# Patient Record
Sex: MALE | Race: WHITE | Employment: FULL TIME | URBAN - METROPOLITAN AREA
[De-identification: names, ages, dates, MRNs, and addresses within clinical notes are randomized per-mention and may not be internally consistent; named-entity substitution may affect disease eponyms.]

---

## 2017-03-16 ENCOUNTER — OFFICE VISIT (OUTPATIENT)
Dept: INTERNAL MEDICINE CLINIC | Age: 42
End: 2017-03-16

## 2017-03-16 VITALS
WEIGHT: 155.4 LBS | DIASTOLIC BLOOD PRESSURE: 72 MMHG | TEMPERATURE: 97.9 F | HEART RATE: 96 BPM | BODY MASS INDEX: 23.55 KG/M2 | HEIGHT: 68 IN | SYSTOLIC BLOOD PRESSURE: 128 MMHG

## 2017-03-16 DIAGNOSIS — Z00.00 ROUTINE GENERAL MEDICAL EXAMINATION AT A HEALTH CARE FACILITY: Primary | ICD-10-CM

## 2017-03-16 DIAGNOSIS — R53.83 FATIGUE, UNSPECIFIED TYPE: ICD-10-CM

## 2017-03-16 DIAGNOSIS — E55.9 VITAMIN D INSUFFICIENCY: ICD-10-CM

## 2017-03-16 DIAGNOSIS — I10 HYPERTENSION, ESSENTIAL: ICD-10-CM

## 2017-03-16 DIAGNOSIS — K21.9 GASTROESOPHAGEAL REFLUX DISEASE WITHOUT ESOPHAGITIS: ICD-10-CM

## 2017-03-16 DIAGNOSIS — E78.2 HYPERLIPIDEMIA, MIXED: ICD-10-CM

## 2017-03-16 DIAGNOSIS — R73.9 HYPERGLYCEMIA: ICD-10-CM

## 2017-03-16 LAB
A/G RATIO: 1.3 (ref 1.1–2.2)
ALBUMIN SERPL-MCNC: 4.7 G/DL (ref 3.4–5)
ALP BLD-CCNC: 92 U/L (ref 40–129)
ALT SERPL-CCNC: 56 U/L (ref 10–40)
ANION GAP SERPL CALCULATED.3IONS-SCNC: 16 MMOL/L (ref 3–16)
AST SERPL-CCNC: 35 U/L (ref 15–37)
BASOPHILS ABSOLUTE: 0 K/UL (ref 0–0.2)
BASOPHILS RELATIVE PERCENT: 0.6 %
BILIRUB SERPL-MCNC: 0.7 MG/DL (ref 0–1)
BUN BLDV-MCNC: 12 MG/DL (ref 7–20)
CALCIUM SERPL-MCNC: 9.2 MG/DL (ref 8.3–10.6)
CHLORIDE BLD-SCNC: 98 MMOL/L (ref 99–110)
CHOLESTEROL, TOTAL: 169 MG/DL (ref 0–199)
CO2: 24 MMOL/L (ref 21–32)
CREAT SERPL-MCNC: 0.6 MG/DL (ref 0.9–1.3)
EOSINOPHILS ABSOLUTE: 0.1 K/UL (ref 0–0.6)
EOSINOPHILS RELATIVE PERCENT: 2.8 %
FOLATE: >20 NG/ML (ref 4.78–24.2)
GFR AFRICAN AMERICAN: >60
GFR NON-AFRICAN AMERICAN: >60
GLOBULIN: 3.7 G/DL
GLUCOSE BLD-MCNC: 115 MG/DL (ref 70–99)
HCT VFR BLD CALC: 45.4 % (ref 40.5–52.5)
HDLC SERPL-MCNC: 39 MG/DL (ref 40–60)
HEMOGLOBIN: 15.3 G/DL (ref 13.5–17.5)
LDL CHOLESTEROL CALCULATED: 103 MG/DL
LYMPHOCYTES ABSOLUTE: 0.8 K/UL (ref 1–5.1)
LYMPHOCYTES RELATIVE PERCENT: 18.1 %
MCH RBC QN AUTO: 31.2 PG (ref 26–34)
MCHC RBC AUTO-ENTMCNC: 33.7 G/DL (ref 31–36)
MCV RBC AUTO: 92.5 FL (ref 80–100)
MONOCYTES ABSOLUTE: 0.4 K/UL (ref 0–1.3)
MONOCYTES RELATIVE PERCENT: 8.2 %
NEUTROPHILS ABSOLUTE: 3.1 K/UL (ref 1.7–7.7)
NEUTROPHILS RELATIVE PERCENT: 70.3 %
PDW BLD-RTO: 13.1 % (ref 12.4–15.4)
PLATELET # BLD: 153 K/UL (ref 135–450)
PMV BLD AUTO: 9.5 FL (ref 5–10.5)
POTASSIUM SERPL-SCNC: 4.7 MMOL/L (ref 3.5–5.1)
RBC # BLD: 4.9 M/UL (ref 4.2–5.9)
SODIUM BLD-SCNC: 138 MMOL/L (ref 136–145)
TOTAL PROTEIN: 8.4 G/DL (ref 6.4–8.2)
TRIGL SERPL-MCNC: 134 MG/DL (ref 0–150)
TSH REFLEX: 2.28 UIU/ML (ref 0.27–4.2)
VITAMIN B-12: 883 PG/ML (ref 211–911)
VLDLC SERPL CALC-MCNC: 27 MG/DL
WBC # BLD: 4.5 K/UL (ref 4–11)

## 2017-03-16 PROCEDURE — 99214 OFFICE O/P EST MOD 30 MIN: CPT | Performed by: FAMILY MEDICINE

## 2017-03-16 PROCEDURE — 99396 PREV VISIT EST AGE 40-64: CPT | Performed by: FAMILY MEDICINE

## 2017-03-16 PROCEDURE — 36415 COLL VENOUS BLD VENIPUNCTURE: CPT | Performed by: FAMILY MEDICINE

## 2017-03-16 RX ORDER — LISINOPRIL 20 MG/1
TABLET ORAL
Qty: 90 TABLET | Refills: 1 | Status: SHIPPED | OUTPATIENT
Start: 2017-03-16 | End: 2017-05-25 | Stop reason: SDUPTHER

## 2017-03-16 RX ORDER — ATORVASTATIN CALCIUM 20 MG/1
TABLET, FILM COATED ORAL
Qty: 90 TABLET | Refills: 1 | Status: SHIPPED | OUTPATIENT
Start: 2017-03-16 | End: 2017-10-02 | Stop reason: SDUPTHER

## 2017-03-16 RX ORDER — OMEPRAZOLE 20 MG/1
20 CAPSULE, DELAYED RELEASE ORAL DAILY
COMMUNITY
End: 2017-12-15 | Stop reason: ALTCHOICE

## 2017-03-17 LAB
ESTIMATED AVERAGE GLUCOSE: 99.7 MG/DL
HBA1C MFR BLD: 5.1 %
VITAMIN D 25-HYDROXY: 58.5 NG/ML

## 2017-04-18 ENCOUNTER — TELEPHONE (OUTPATIENT)
Dept: INTERNAL MEDICINE CLINIC | Age: 42
End: 2017-04-18

## 2017-05-25 ENCOUNTER — TELEPHONE (OUTPATIENT)
Dept: INTERNAL MEDICINE CLINIC | Age: 42
End: 2017-05-25

## 2017-05-25 DIAGNOSIS — I10 HYPERTENSION, ESSENTIAL: ICD-10-CM

## 2017-05-25 DIAGNOSIS — Z00.00 ROUTINE GENERAL MEDICAL EXAMINATION AT A HEALTH CARE FACILITY: ICD-10-CM

## 2017-05-25 RX ORDER — LISINOPRIL 20 MG/1
TABLET ORAL
Qty: 90 TABLET | Refills: 1 | Status: SHIPPED | OUTPATIENT
Start: 2017-05-25 | End: 2017-10-02 | Stop reason: SDUPTHER

## 2017-10-02 ENCOUNTER — TELEPHONE (OUTPATIENT)
Dept: INTERNAL MEDICINE CLINIC | Age: 42
End: 2017-10-02

## 2017-10-02 DIAGNOSIS — E78.2 HYPERLIPIDEMIA, MIXED: ICD-10-CM

## 2017-10-02 DIAGNOSIS — Z00.00 ROUTINE GENERAL MEDICAL EXAMINATION AT A HEALTH CARE FACILITY: ICD-10-CM

## 2017-10-02 DIAGNOSIS — I10 HYPERTENSION, ESSENTIAL: ICD-10-CM

## 2017-10-02 NOTE — TELEPHONE ENCOUNTER
Patient will need refills for lisinopril and Atorvastatin 90 days sent to [facility].    Questions, call back at [phone]

## 2017-10-02 NOTE — TELEPHONE ENCOUNTER
Refill request for atorvastatin/lisinopril medication.      Name of [facility]- [facility]    Last visit - 3-16-17     Pending visit - 11-21-17    Last refill -3-16-17/5-25-17    Medication Contract signed -   Last Konrad barragan-     Additional Comments

## 2017-10-03 RX ORDER — LISINOPRIL 20 MG/1
TABLET ORAL
Qty: 90 TABLET | Refills: 1 | Status: SHIPPED | OUTPATIENT
Start: 2017-10-03 | End: 2018-04-18 | Stop reason: SDUPTHER

## 2017-10-03 RX ORDER — ATORVASTATIN CALCIUM 20 MG/1
TABLET, FILM COATED ORAL
Qty: 90 TABLET | Refills: 1 | Status: SHIPPED | OUTPATIENT
Start: 2017-10-03 | End: 2018-03-22 | Stop reason: SDUPTHER

## 2017-11-21 ENCOUNTER — OFFICE VISIT (OUTPATIENT)
Dept: INTERNAL MEDICINE CLINIC | Age: 42
End: 2017-11-21

## 2017-11-21 VITALS
SYSTOLIC BLOOD PRESSURE: 126 MMHG | DIASTOLIC BLOOD PRESSURE: 70 MMHG | HEIGHT: 68 IN | BODY MASS INDEX: 21.1 KG/M2 | WEIGHT: 139.2 LBS | HEART RATE: 72 BPM

## 2017-11-21 DIAGNOSIS — I10 HYPERTENSION, ESSENTIAL: Primary | ICD-10-CM

## 2017-11-21 DIAGNOSIS — E78.2 HYPERLIPIDEMIA, MIXED: ICD-10-CM

## 2017-11-21 DIAGNOSIS — R73.9 HYPERGLYCEMIA: ICD-10-CM

## 2017-11-21 DIAGNOSIS — K21.9 GASTROESOPHAGEAL REFLUX DISEASE, ESOPHAGITIS PRESENCE NOT SPECIFIED: ICD-10-CM

## 2017-11-21 LAB
A/G RATIO: 1.1 (ref 1.1–2.2)
ALBUMIN SERPL-MCNC: 4.5 G/DL (ref 3.4–5)
ALP BLD-CCNC: 98 U/L (ref 40–129)
ALT SERPL-CCNC: 22 U/L (ref 10–40)
ANION GAP SERPL CALCULATED.3IONS-SCNC: 12 MMOL/L (ref 3–16)
AST SERPL-CCNC: 23 U/L (ref 15–37)
BILIRUB SERPL-MCNC: 0.8 MG/DL (ref 0–1)
BUN BLDV-MCNC: 11 MG/DL (ref 7–20)
CALCIUM SERPL-MCNC: 9.3 MG/DL (ref 8.3–10.6)
CHLORIDE BLD-SCNC: 96 MMOL/L (ref 99–110)
CHOLESTEROL, TOTAL: 124 MG/DL (ref 0–199)
CO2: 28 MMOL/L (ref 21–32)
CREAT SERPL-MCNC: 0.7 MG/DL (ref 0.9–1.3)
GFR AFRICAN AMERICAN: >60
GFR NON-AFRICAN AMERICAN: >60
GLOBULIN: 4 G/DL
GLUCOSE BLD-MCNC: 99 MG/DL (ref 70–99)
HDLC SERPL-MCNC: 49 MG/DL (ref 40–60)
LDL CHOLESTEROL CALCULATED: 63 MG/DL
POTASSIUM SERPL-SCNC: 4.4 MMOL/L (ref 3.5–5.1)
SODIUM BLD-SCNC: 136 MMOL/L (ref 136–145)
TOTAL PROTEIN: 8.5 G/DL (ref 6.4–8.2)
TRIGL SERPL-MCNC: 59 MG/DL (ref 0–150)
VLDLC SERPL CALC-MCNC: 12 MG/DL

## 2017-11-21 PROCEDURE — 36415 COLL VENOUS BLD VENIPUNCTURE: CPT | Performed by: FAMILY MEDICINE

## 2017-11-21 PROCEDURE — 99214 OFFICE O/P EST MOD 30 MIN: CPT | Performed by: FAMILY MEDICINE

## 2017-11-21 NOTE — PROGRESS NOTES
Subjective:      Patient ID: Milena Aguirre is a 38 y.o. male. HPI  Hypertension:  Home blood pressure monitoring: Yes - always <140/90. He is generally adherent to a low sodium diet. Patient denies chest pain, shortness of breath, headache, lightheadedness, blurred vision, peripheral edema, palpitations and dry cough. Antihypertensive medication side effects: no medication side effects noted. Use of agents associated with hypertension: NSAIDS. Sodium (mmol/L)   Date Value   03/16/2017 138    BUN (mg/dL)   Date Value   03/16/2017 12    Glucose (mg/dL)   Date Value   03/16/2017 115 (H)      Potassium (mmol/L)   Date Value   03/16/2017 4.7    CREATININE (mg/dL)   Date Value   03/16/2017 0.6 (L)         Hyperlipidemia:  No new myalgias or GI upset on atorvastatin (Lipitor). Medication compliance: compliant most of the time. Patient is  following a low fat, low cholesterol diet. \"Were 75% vegetarian now,\" and pt has lost about 23 pounds. Stopped buying meat at home, but will still eat it at restaurants or friends'/family's houses. He is  exercising regularly. Does hot yoga 6-8x/week (sometimes goes twice a day). Lab Results   Component Value Date    TRIG 134 03/16/2017    HDL 39 03/16/2017    HDL 43 05/22/2012    LDLCALC 103 03/16/2017     Lab Results   Component Value Date    ALT 56 (H) 03/16/2017    AST 35 03/16/2017        Hyperglycemia  Has made a lot of lifestyle changes and has lost weight (intentionally). See above. Due for recheck of labs today. GERD  Kyat complains of heartburn. This has been associated with heartburn, midespigastric pain and upper abdominal discomfort. He denies difficulty swallowing, dysphagia, hematemesis, melena, regurgitation of undigested food, shortness of breath, unexpected weight loss and wheezing. Symptoms have been present for several years. He denies dysphagia. He has not lost weight.  He denies melena, hematochezia, hematemesis, and coffee ground emesis. Medical therapy in the past has included proton pump inhibitors. Sx stable on Prilosec OTC but asking about its safety long term. Has not tried H2 blockers. Outpatient Prescriptions Marked as Taking for the 11/21/17 encounter (Office Visit) with Mandi Butler MD   Medication Sig Dispense Refill    atorvastatin (LIPITOR) 20 MG tablet TAKE 1 TABLET DAILY 90 tablet 1    lisinopril (PRINIVIL;ZESTRIL) 20 MG tablet TAKE 1 TABLET EVERY DAY 90 tablet 1    omeprazole (PRILOSEC) 20 MG delayed release capsule Take 20 mg by mouth daily      ciclopirox (LOPROX) 0.77 % cream Apply topically 2 times daily. 60 g 1    Cholecalciferol (VITAMIN D PO) Take 4,000 Int'l Units by mouth daily. Review of Systems  Review of Systems - General ROS: negative  Psychological ROS: negative  ENT ROS: negative  Respiratory ROS: no cough, shortness of breath, or wheezing  Cardiovascular ROS: no chest pain or dyspnea on exertion  Gastrointestinal ROS: no abdominal pain, change in bowel habits, or black or bloody stools  Genito-Urinary ROS: no dysuria, trouble voiding, or hematuria  Musculoskeletal ROS: negative  Neurological ROS: no TIA or stroke symptoms  Dermatological ROS: negative      Objective:   Physical Exam  Nursing note and vitals reviewed. Vitals:    11/21/17 0851   BP: 126/70   Pulse: 72   Weight: 139 lb 3.2 oz (63.1 kg)   Height: 5' 7.5\" (1.715 m)     Wt Readings from Last 3 Encounters:   11/21/17 139 lb 3.2 oz (63.1 kg)   03/16/17 155 lb 6.4 oz (70.5 kg)   10/11/16 152 lb 3.2 oz (69 kg)     BP Readings from Last 3 Encounters:   11/21/17 126/70   03/16/17 128/72   10/11/16 134/78     Body mass index is 21.48 kg/m². Constitutional: Patient appears well-developed and well-nourished. No distress. Head: Normocephalic and atraumatic. Oropharyngeal exam: mucous membranes moist, pharynx normal without lesions. Neck: Normal range of motion. Neck supple. No thyroidmegaly.

## 2017-11-22 LAB
ESTIMATED AVERAGE GLUCOSE: 105.4 MG/DL
HBA1C MFR BLD: 5.3 %

## 2017-12-14 ENCOUNTER — TELEPHONE (OUTPATIENT)
Dept: INTERNAL MEDICINE CLINIC | Age: 42
End: 2017-12-14

## 2017-12-15 ENCOUNTER — OFFICE VISIT (OUTPATIENT)
Dept: ORTHOPEDIC SURGERY | Age: 42
End: 2017-12-15

## 2017-12-15 VITALS
BODY MASS INDEX: 21.52 KG/M2 | SYSTOLIC BLOOD PRESSURE: 131 MMHG | DIASTOLIC BLOOD PRESSURE: 81 MMHG | WEIGHT: 142 LBS | HEIGHT: 68 IN | HEART RATE: 67 BPM

## 2017-12-15 DIAGNOSIS — S46.912A STRAIN OF ACROMIOCLAVICULAR JOINT, LEFT, INITIAL ENCOUNTER: Primary | ICD-10-CM

## 2017-12-15 DIAGNOSIS — M25.512 PAIN OF LEFT SHOULDER REGION: ICD-10-CM

## 2017-12-15 PROBLEM — S46.919A STRAIN OF ACROMIOCLAVICULAR JOINT: Status: ACTIVE | Noted: 2017-12-15

## 2017-12-15 PROCEDURE — 99213 OFFICE O/P EST LOW 20 MIN: CPT | Performed by: PHYSICIAN ASSISTANT

## 2017-12-15 NOTE — PROGRESS NOTES
as well as  press for shoulder exercises may increase his level pain and discomfort. Load bearing activities such as yoga may also occasionally cause him problems but I do not think this would be something that would limit him long-term. He is instructed to take ice and oral anti-inflammatories occasionally as needed for his symptoms.   Follow-up as needed

## 2018-03-22 DIAGNOSIS — Z00.00 ROUTINE GENERAL MEDICAL EXAMINATION AT A HEALTH CARE FACILITY: ICD-10-CM

## 2018-03-22 DIAGNOSIS — E78.2 HYPERLIPIDEMIA, MIXED: ICD-10-CM

## 2018-03-22 RX ORDER — ATORVASTATIN CALCIUM 20 MG/1
TABLET, FILM COATED ORAL
Qty: 90 TABLET | Refills: 1 | Status: SHIPPED | OUTPATIENT
Start: 2018-03-22 | End: 2018-10-08 | Stop reason: SDUPTHER

## 2018-04-18 DIAGNOSIS — I10 HYPERTENSION, ESSENTIAL: ICD-10-CM

## 2018-04-18 DIAGNOSIS — Z00.00 ROUTINE GENERAL MEDICAL EXAMINATION AT A HEALTH CARE FACILITY: ICD-10-CM

## 2018-04-18 RX ORDER — LISINOPRIL 20 MG/1
TABLET ORAL
Qty: 90 TABLET | Refills: 1 | Status: SHIPPED | OUTPATIENT
Start: 2018-04-18 | End: 2018-10-08 | Stop reason: SDUPTHER

## 2018-05-22 ENCOUNTER — OFFICE VISIT (OUTPATIENT)
Dept: INTERNAL MEDICINE CLINIC | Age: 43
End: 2018-05-22

## 2018-05-22 VITALS
HEART RATE: 78 BPM | HEIGHT: 67 IN | OXYGEN SATURATION: 98 % | WEIGHT: 141 LBS | BODY MASS INDEX: 22.13 KG/M2 | DIASTOLIC BLOOD PRESSURE: 78 MMHG | SYSTOLIC BLOOD PRESSURE: 132 MMHG

## 2018-05-22 DIAGNOSIS — K21.9 GASTROESOPHAGEAL REFLUX DISEASE, ESOPHAGITIS PRESENCE NOT SPECIFIED: ICD-10-CM

## 2018-05-22 DIAGNOSIS — E78.2 HYPERLIPIDEMIA, MIXED: ICD-10-CM

## 2018-05-22 DIAGNOSIS — Z00.00 ROUTINE GENERAL MEDICAL EXAMINATION AT A HEALTH CARE FACILITY: Primary | ICD-10-CM

## 2018-05-22 DIAGNOSIS — Z23 NEED FOR VACCINE FOR DT (DIPHTHERIA-TETANUS): ICD-10-CM

## 2018-05-22 DIAGNOSIS — E55.9 VITAMIN D INSUFFICIENCY: ICD-10-CM

## 2018-05-22 DIAGNOSIS — R73.9 HYPERGLYCEMIA: ICD-10-CM

## 2018-05-22 DIAGNOSIS — I10 HYPERTENSION, ESSENTIAL: ICD-10-CM

## 2018-05-22 PROCEDURE — 90714 TD VACC NO PRESV 7 YRS+ IM: CPT | Performed by: FAMILY MEDICINE

## 2018-05-22 PROCEDURE — 99214 OFFICE O/P EST MOD 30 MIN: CPT | Performed by: FAMILY MEDICINE

## 2018-05-22 PROCEDURE — 90471 IMMUNIZATION ADMIN: CPT | Performed by: FAMILY MEDICINE

## 2018-05-22 PROCEDURE — 99396 PREV VISIT EST AGE 40-64: CPT | Performed by: FAMILY MEDICINE

## 2018-05-22 ASSESSMENT — ENCOUNTER SYMPTOMS
RHINORRHEA: 1
WHEEZING: 0
SORE THROAT: 0
BACK PAIN: 0
ABDOMINAL PAIN: 0
VOMITING: 0
CHEST TIGHTNESS: 0
COLOR CHANGE: 0
DIARRHEA: 0
COUGH: 1
CONSTIPATION: 0
SHORTNESS OF BREATH: 0
TROUBLE SWALLOWING: 0
EYE PAIN: 0
EYE REDNESS: 0
NAUSEA: 0
EYE DISCHARGE: 0
SINUS PRESSURE: 0

## 2018-05-23 LAB
A/G RATIO: 1.1 (ref 1.1–2.2)
ALBUMIN SERPL-MCNC: 4.4 G/DL (ref 3.4–5)
ALP BLD-CCNC: 102 U/L (ref 40–129)
ALT SERPL-CCNC: 29 U/L (ref 10–40)
ANION GAP SERPL CALCULATED.3IONS-SCNC: 13 MMOL/L (ref 3–16)
AST SERPL-CCNC: 22 U/L (ref 15–37)
BASOPHILS ABSOLUTE: 0 K/UL (ref 0–0.2)
BASOPHILS RELATIVE PERCENT: 0.4 %
BILIRUB SERPL-MCNC: 0.9 MG/DL (ref 0–1)
BUN BLDV-MCNC: 10 MG/DL (ref 7–20)
CALCIUM SERPL-MCNC: 9 MG/DL (ref 8.3–10.6)
CHLORIDE BLD-SCNC: 94 MMOL/L (ref 99–110)
CHOLESTEROL, TOTAL: 125 MG/DL (ref 0–199)
CO2: 27 MMOL/L (ref 21–32)
CREAT SERPL-MCNC: 0.7 MG/DL (ref 0.9–1.3)
EOSINOPHILS ABSOLUTE: 0.3 K/UL (ref 0–0.6)
EOSINOPHILS RELATIVE PERCENT: 5.3 %
ESTIMATED AVERAGE GLUCOSE: 91.1 MG/DL
GFR AFRICAN AMERICAN: >60
GFR NON-AFRICAN AMERICAN: >60
GLOBULIN: 4 G/DL
GLUCOSE BLD-MCNC: 105 MG/DL (ref 70–99)
HBA1C MFR BLD: 4.8 %
HCT VFR BLD CALC: 44.3 % (ref 40.5–52.5)
HDLC SERPL-MCNC: 42 MG/DL (ref 40–60)
HEMOGLOBIN: 15.5 G/DL (ref 13.5–17.5)
LDL CHOLESTEROL CALCULATED: 65 MG/DL
LYMPHOCYTES ABSOLUTE: 0.7 K/UL (ref 1–5.1)
LYMPHOCYTES RELATIVE PERCENT: 14.5 %
MCH RBC QN AUTO: 33.2 PG (ref 26–34)
MCHC RBC AUTO-ENTMCNC: 34.9 G/DL (ref 31–36)
MCV RBC AUTO: 95.2 FL (ref 80–100)
MONOCYTES ABSOLUTE: 0.5 K/UL (ref 0–1.3)
MONOCYTES RELATIVE PERCENT: 9.9 %
NEUTROPHILS ABSOLUTE: 3.3 K/UL (ref 1.7–7.7)
NEUTROPHILS RELATIVE PERCENT: 69.9 %
PDW BLD-RTO: 13.8 % (ref 12.4–15.4)
PLATELET # BLD: 163 K/UL (ref 135–450)
PMV BLD AUTO: 9.2 FL (ref 5–10.5)
POTASSIUM SERPL-SCNC: 4.6 MMOL/L (ref 3.5–5.1)
RBC # BLD: 4.66 M/UL (ref 4.2–5.9)
SODIUM BLD-SCNC: 134 MMOL/L (ref 136–145)
TOTAL PROTEIN: 8.4 G/DL (ref 6.4–8.2)
TRIGL SERPL-MCNC: 88 MG/DL (ref 0–150)
VITAMIN D 25-HYDROXY: 32.1 NG/ML
VLDLC SERPL CALC-MCNC: 18 MG/DL
WBC # BLD: 4.7 K/UL (ref 4–11)

## 2018-10-08 DIAGNOSIS — Z00.00 ROUTINE GENERAL MEDICAL EXAMINATION AT A HEALTH CARE FACILITY: ICD-10-CM

## 2018-10-08 DIAGNOSIS — E78.2 HYPERLIPIDEMIA, MIXED: ICD-10-CM

## 2018-10-08 DIAGNOSIS — I10 HYPERTENSION, ESSENTIAL: ICD-10-CM

## 2018-10-08 RX ORDER — LISINOPRIL 20 MG/1
TABLET ORAL
Qty: 90 TABLET | Refills: 0 | Status: SHIPPED | OUTPATIENT
Start: 2018-10-08 | End: 2019-01-22 | Stop reason: SDUPTHER

## 2018-10-08 RX ORDER — ATORVASTATIN CALCIUM 20 MG/1
TABLET, FILM COATED ORAL
Qty: 90 TABLET | Refills: 0 | Status: SHIPPED | OUTPATIENT
Start: 2018-10-08 | End: 2019-01-22 | Stop reason: SDUPTHER

## 2018-10-16 ENCOUNTER — OFFICE VISIT (OUTPATIENT)
Dept: INTERNAL MEDICINE CLINIC | Age: 43
End: 2018-10-16
Payer: COMMERCIAL

## 2018-10-16 VITALS
OXYGEN SATURATION: 100 % | BODY MASS INDEX: 23.53 KG/M2 | DIASTOLIC BLOOD PRESSURE: 78 MMHG | HEART RATE: 90 BPM | WEIGHT: 148 LBS | SYSTOLIC BLOOD PRESSURE: 118 MMHG

## 2018-10-16 DIAGNOSIS — K21.9 GASTROESOPHAGEAL REFLUX DISEASE, ESOPHAGITIS PRESENCE NOT SPECIFIED: Primary | ICD-10-CM

## 2018-10-16 PROCEDURE — 99213 OFFICE O/P EST LOW 20 MIN: CPT | Performed by: NURSE PRACTITIONER

## 2018-10-16 RX ORDER — DOXYCYCLINE 100 MG/1
CAPSULE ORAL
Refills: 0 | COMMUNITY
Start: 2018-09-13 | End: 2018-10-16 | Stop reason: ALTCHOICE

## 2018-10-16 RX ORDER — OMEPRAZOLE 20 MG/1
20 CAPSULE, DELAYED RELEASE ORAL DAILY
Qty: 30 CAPSULE | Refills: 3 | Status: SHIPPED | OUTPATIENT
Start: 2018-10-16 | End: 2019-01-22 | Stop reason: ALTCHOICE

## 2018-10-16 ASSESSMENT — ENCOUNTER SYMPTOMS
HEARTBURN: 1
VOMITING: 0
EYE ITCHING: 0
ABDOMINAL DISTENTION: 0
EYE REDNESS: 0
CONSTIPATION: 0
WHEEZING: 0
SINUS PRESSURE: 0
RHINORRHEA: 0
STRIDOR: 0
SINUS PAIN: 0
CHEST TIGHTNESS: 0
WATER BRASH: 0
COLOR CHANGE: 0
TROUBLE SWALLOWING: 0
EYE DISCHARGE: 0
DIARRHEA: 0
SORE THROAT: 0
SHORTNESS OF BREATH: 0
GLOBUS SENSATION: 0
BELCHING: 1
NAUSEA: 1
CHOKING: 0
HOARSE VOICE: 0
APNEA: 0
ABDOMINAL PAIN: 0
COUGH: 0
EYE PAIN: 0

## 2018-10-16 ASSESSMENT — PATIENT HEALTH QUESTIONNAIRE - PHQ9
1. LITTLE INTEREST OR PLEASURE IN DOING THINGS: 0
SUM OF ALL RESPONSES TO PHQ QUESTIONS 1-9: 0
SUM OF ALL RESPONSES TO PHQ9 QUESTIONS 1 & 2: 0
SUM OF ALL RESPONSES TO PHQ QUESTIONS 1-9: 0
2. FEELING DOWN, DEPRESSED OR HOPELESS: 0

## 2018-10-16 NOTE — PATIENT INSTRUCTIONS
Smoking can make GERD worse. If you need help quitting, talk to your doctor about stop-smoking programs and medicines. These can increase your chances of quitting for good. · If you have GERD symptoms at night, raise the head of your bed 6 to 8 inches by putting the frame on blocks or placing a foam wedge under the head of your mattress. (Adding extra pillows does not work.)  · Do not wear tight clothing around your middle. · Lose weight if you need to. Losing just 5 to 10 pounds can help. When should you call for help? Call your doctor now or seek immediate medical care if:    · You have new or different belly pain.     · Your stools are black and tarlike or have streaks of blood.    Watch closely for changes in your health, and be sure to contact your doctor if:    · Your symptoms have not improved after 2 days.     · Food seems to catch in your throat or chest.   Where can you learn more? Go to [email or web address] [email or web address].[employer]. org and sign in to your [employer] account. Enter X604 in the [employer] box to learn more about \"Gastroesophageal Reflux Disease (GERD): Care Instructions. \"     If you do not have an account, please click on the \"Sign Up Now\" link. Current as of: May 12, 2017  Content Version: 11.7  © 3860-4556 [employer]. Care instructions adapted under license by [facility] ([facility]). If you have questions about a medical condition or this instruction, always ask your healthcare professional. [place] [address] any warranty or liability for your use of this information.

## 2018-11-26 ENCOUNTER — TELEPHONE (OUTPATIENT)
Dept: INTERNAL MEDICINE CLINIC | Age: 43
End: 2018-11-26

## 2018-11-26 DIAGNOSIS — E55.9 VITAMIN D INSUFFICIENCY: ICD-10-CM

## 2018-11-26 DIAGNOSIS — R73.9 HYPERGLYCEMIA: ICD-10-CM

## 2018-11-26 DIAGNOSIS — E78.2 HYPERLIPIDEMIA, MIXED: ICD-10-CM

## 2018-11-26 DIAGNOSIS — I10 HYPERTENSION, ESSENTIAL: ICD-10-CM

## 2018-11-26 DIAGNOSIS — Z00.00 ROUTINE GENERAL MEDICAL EXAMINATION AT A HEALTH CARE FACILITY: Primary | ICD-10-CM

## 2019-01-18 ENCOUNTER — HOSPITAL ENCOUNTER (OUTPATIENT)
Age: 44
Discharge: HOME | End: 2019-01-18
Payer: COMMERCIAL

## 2019-01-18 DIAGNOSIS — E55.9 VITAMIN D INSUFFICIENCY: ICD-10-CM

## 2019-01-18 DIAGNOSIS — I10 HYPERTENSION, ESSENTIAL: ICD-10-CM

## 2019-01-18 DIAGNOSIS — E78.2 HYPERLIPIDEMIA, MIXED: ICD-10-CM

## 2019-01-18 DIAGNOSIS — R73.9 HYPERGLYCEMIA: ICD-10-CM

## 2019-01-18 DIAGNOSIS — Z00.00 ROUTINE GENERAL MEDICAL EXAMINATION AT A HEALTH CARE FACILITY: ICD-10-CM

## 2019-01-18 LAB
A/G RATIO: 0.9 (ref 1.1–2.2)
ALBUMIN SERPL-MCNC: 4.6 G/DL (ref 3.4–5)
ALP BLD-CCNC: 112 U/L (ref 40–129)
ALT SERPL-CCNC: 38 U/L (ref 10–40)
ANION GAP SERPL CALCULATED.3IONS-SCNC: 13 MMOL/L (ref 3–16)
AST SERPL-CCNC: 29 U/L (ref 15–37)
BASOPHILS ABSOLUTE: 0 K/UL (ref 0–0.2)
BASOPHILS RELATIVE PERCENT: 0.5 %
BILIRUB SERPL-MCNC: 0.8 MG/DL (ref 0–1)
BUN BLDV-MCNC: 12 MG/DL (ref 7–20)
CALCIUM SERPL-MCNC: 9.6 MG/DL (ref 8.3–10.6)
CHLORIDE BLD-SCNC: 96 MMOL/L (ref 99–110)
CHOLESTEROL, TOTAL: 127 MG/DL (ref 0–199)
CO2: 27 MMOL/L (ref 21–32)
CREAT SERPL-MCNC: 0.8 MG/DL (ref 0.9–1.3)
EOSINOPHILS ABSOLUTE: 0.2 K/UL (ref 0–0.6)
EOSINOPHILS RELATIVE PERCENT: 2.6 %
ESTIMATED AVERAGE GLUCOSE: 99.7 MG/DL
GFR AFRICAN AMERICAN: >60
GFR NON-AFRICAN AMERICAN: >60
GLOBULIN: 4.9 G/DL
GLUCOSE BLD-MCNC: 92 MG/DL (ref 70–99)
HBA1C MFR BLD: 5.1 %
HCT VFR BLD CALC: 46.1 % (ref 40.5–52.5)
HDLC SERPL-MCNC: 41 MG/DL (ref 40–60)
HEMOGLOBIN: 16.1 G/DL (ref 13.5–17.5)
LDL CHOLESTEROL CALCULATED: 71 MG/DL
LYMPHOCYTES ABSOLUTE: 0.9 K/UL (ref 1–5.1)
LYMPHOCYTES RELATIVE PERCENT: 13.7 %
MCH RBC QN AUTO: 32.3 PG (ref 26–34)
MCHC RBC AUTO-ENTMCNC: 34.8 G/DL (ref 31–36)
MCV RBC AUTO: 92.9 FL (ref 80–100)
MONOCYTES ABSOLUTE: 0.5 K/UL (ref 0–1.3)
MONOCYTES RELATIVE PERCENT: 7.1 %
NEUTROPHILS ABSOLUTE: 5.1 K/UL (ref 1.7–7.7)
NEUTROPHILS RELATIVE PERCENT: 76.1 %
PDW BLD-RTO: 12.3 % (ref 12.4–15.4)
PLATELET # BLD: 215 K/UL (ref 135–450)
PMV BLD AUTO: 9.5 FL (ref 5–10.5)
POTASSIUM SERPL-SCNC: 4.6 MMOL/L (ref 3.5–5.1)
RBC # BLD: 4.97 M/UL (ref 4.2–5.9)
SODIUM BLD-SCNC: 136 MMOL/L (ref 136–145)
TOTAL PROTEIN: 9.5 G/DL (ref 6.4–8.2)
TRIGL SERPL-MCNC: 75 MG/DL (ref 0–150)
VITAMIN D 25-HYDROXY: 32.8 NG/ML
VLDLC SERPL CALC-MCNC: 15 MG/DL
WBC # BLD: 6.7 K/UL (ref 4–11)

## 2019-01-18 PROCEDURE — 80061 LIPID PANEL: CPT

## 2019-01-18 PROCEDURE — 80053 COMPREHEN METABOLIC PANEL: CPT

## 2019-01-18 PROCEDURE — 36415 COLL VENOUS BLD VENIPUNCTURE: CPT

## 2019-01-18 PROCEDURE — 85025 COMPLETE CBC W/AUTO DIFF WBC: CPT

## 2019-01-18 PROCEDURE — 82306 VITAMIN D 25 HYDROXY: CPT

## 2019-01-18 PROCEDURE — 83036 HEMOGLOBIN GLYCOSYLATED A1C: CPT

## 2019-01-22 ENCOUNTER — OFFICE VISIT (OUTPATIENT)
Dept: INTERNAL MEDICINE CLINIC | Age: 44
End: 2019-01-22
Payer: COMMERCIAL

## 2019-01-22 VITALS
DIASTOLIC BLOOD PRESSURE: 64 MMHG | OXYGEN SATURATION: 99 % | SYSTOLIC BLOOD PRESSURE: 114 MMHG | BODY MASS INDEX: 23.39 KG/M2 | WEIGHT: 149 LBS | HEIGHT: 67 IN | HEART RATE: 67 BPM

## 2019-01-22 DIAGNOSIS — H61.22 HEARING LOSS DUE TO CERUMEN IMPACTION, LEFT: ICD-10-CM

## 2019-01-22 DIAGNOSIS — J40 BRONCHITIS: ICD-10-CM

## 2019-01-22 DIAGNOSIS — I10 HYPERTENSION, ESSENTIAL: Primary | ICD-10-CM

## 2019-01-22 DIAGNOSIS — E78.2 HYPERLIPIDEMIA, MIXED: ICD-10-CM

## 2019-01-22 DIAGNOSIS — R73.9 HYPERGLYCEMIA: ICD-10-CM

## 2019-01-22 PROCEDURE — 69210 REMOVE IMPACTED EAR WAX UNI: CPT | Performed by: FAMILY MEDICINE

## 2019-01-22 PROCEDURE — 99214 OFFICE O/P EST MOD 30 MIN: CPT | Performed by: FAMILY MEDICINE

## 2019-01-22 RX ORDER — LISINOPRIL 20 MG/1
TABLET ORAL
Qty: 90 TABLET | Refills: 3 | Status: SHIPPED | OUTPATIENT
Start: 2019-01-22 | End: 2020-01-22 | Stop reason: SDUPTHER

## 2019-01-22 RX ORDER — BENZONATATE 100 MG/1
100-200 CAPSULE ORAL 3 TIMES DAILY PRN
Qty: 60 CAPSULE | Refills: 0 | Status: SHIPPED | OUTPATIENT
Start: 2019-01-22 | End: 2019-01-29

## 2019-01-22 RX ORDER — AZITHROMYCIN 250 MG/1
TABLET, FILM COATED ORAL
Qty: 6 TABLET | Refills: 0 | Status: SHIPPED | OUTPATIENT
Start: 2019-01-22 | End: 2019-12-03 | Stop reason: ALTCHOICE

## 2019-01-22 RX ORDER — ATORVASTATIN CALCIUM 20 MG/1
TABLET, FILM COATED ORAL
Qty: 90 TABLET | Refills: 3 | Status: SHIPPED | OUTPATIENT
Start: 2019-01-22 | End: 2020-01-22 | Stop reason: SDUPTHER

## 2019-01-22 ASSESSMENT — ENCOUNTER SYMPTOMS
BACK PAIN: 0
SINUS PRESSURE: 0
NAUSEA: 0
CHEST TIGHTNESS: 0
RHINORRHEA: 0
ABDOMINAL PAIN: 0
EYE PAIN: 0
WHEEZING: 0
DIARRHEA: 0
EYE DISCHARGE: 0
VOMITING: 0
COLOR CHANGE: 0
EYE REDNESS: 0
SORE THROAT: 0
SHORTNESS OF BREATH: 0
CONSTIPATION: 0
TROUBLE SWALLOWING: 0

## 2019-02-16 ASSESSMENT — ENCOUNTER SYMPTOMS: COUGH: 1

## 2019-05-14 ENCOUNTER — TELEPHONE (OUTPATIENT)
Dept: INTERNAL MEDICINE CLINIC | Age: 44
End: 2019-05-14

## 2019-05-14 DIAGNOSIS — L08.9 INFECTION OF SKIN DUE TO METHICILLIN RESISTANT STAPHYLOCOCCUS AUREUS (MRSA): Primary | ICD-10-CM

## 2019-05-14 DIAGNOSIS — B95.62 INFECTION OF SKIN DUE TO METHICILLIN RESISTANT STAPHYLOCOCCUS AUREUS (MRSA): Primary | ICD-10-CM

## 2019-05-14 RX ORDER — CHLORHEXIDINE GLUCONATE 4 G/100ML
SOLUTION TOPICAL
Qty: 3780 ML | Refills: 0 | Status: SHIPPED | OUTPATIENT
Start: 2019-05-14 | End: 2019-05-28

## 2019-05-14 NOTE — TELEPHONE ENCOUNTER
Patient went to urgent care for ongoing MRSA  And they advised him to have you order a 30 days supply of Liviclens soap to clear this up.  Send to [facility] [place] [id number]

## 2019-05-15 DIAGNOSIS — B95.62 INFECTION OF SKIN DUE TO METHICILLIN RESISTANT STAPHYLOCOCCUS AUREUS (MRSA): Primary | ICD-10-CM

## 2019-05-15 DIAGNOSIS — L08.9 INFECTION OF SKIN DUE TO METHICILLIN RESISTANT STAPHYLOCOCCUS AUREUS (MRSA): Primary | ICD-10-CM

## 2019-05-24 NOTE — TELEPHONE ENCOUNTER
Pt went to the [address] [place] [address] Lab to have the MRSA Swab done, ordered by Dr. Jodi Negro. , and she told him to go to any [facility] lab. They told him they couldn't do a swab there and told him to go to the [facility] office next door, and they wouldn't do it either because he was not a patient of that office. ... He was very upset. Asks where he can get the Swab done or can he just get on another Antibiotic as a precaution. Call him back . [facility] [phone]

## 2019-05-28 NOTE — TELEPHONE ENCOUNTER
Spoke with patient, He will have to have the swab done at one of the hospital labs. He will come into [facility] today. He has been using OTC Hibiclens for the past week.

## 2019-05-29 ENCOUNTER — TELEPHONE (OUTPATIENT)
Dept: INTERNAL MEDICINE CLINIC | Age: 44
End: 2019-05-29

## 2019-06-15 ENCOUNTER — HOSPITAL ENCOUNTER (OUTPATIENT)
Age: 44
Discharge: HOME | End: 2019-06-15
Payer: COMMERCIAL

## 2019-06-15 DIAGNOSIS — B95.62 INFECTION OF SKIN DUE TO METHICILLIN RESISTANT STAPHYLOCOCCUS AUREUS (MRSA): ICD-10-CM

## 2019-06-15 DIAGNOSIS — L08.9 INFECTION OF SKIN DUE TO METHICILLIN RESISTANT STAPHYLOCOCCUS AUREUS (MRSA): ICD-10-CM

## 2019-06-15 PROCEDURE — 87641 MR-STAPH DNA AMP PROBE: CPT

## 2019-06-16 LAB — MRSA SCREEN RT-PCR: NORMAL

## 2019-12-03 ENCOUNTER — OFFICE VISIT (OUTPATIENT)
Dept: INTERNAL MEDICINE CLINIC | Age: 44
End: 2019-12-03
Payer: COMMERCIAL

## 2019-12-03 VITALS
OXYGEN SATURATION: 99 % | HEIGHT: 66 IN | WEIGHT: 155.4 LBS | DIASTOLIC BLOOD PRESSURE: 70 MMHG | HEART RATE: 72 BPM | BODY MASS INDEX: 24.98 KG/M2 | RESPIRATION RATE: 16 BRPM | SYSTOLIC BLOOD PRESSURE: 116 MMHG

## 2019-12-03 DIAGNOSIS — E78.2 HYPERLIPIDEMIA, MIXED: Primary | ICD-10-CM

## 2019-12-03 DIAGNOSIS — R73.9 HYPERGLYCEMIA: ICD-10-CM

## 2019-12-03 DIAGNOSIS — H69.82 DYSFUNCTION OF LEFT EUSTACHIAN TUBE: ICD-10-CM

## 2019-12-03 DIAGNOSIS — I10 HYPERTENSION, ESSENTIAL: ICD-10-CM

## 2019-12-03 PROCEDURE — 99213 OFFICE O/P EST LOW 20 MIN: CPT | Performed by: NURSE PRACTITIONER

## 2019-12-03 ASSESSMENT — PATIENT HEALTH QUESTIONNAIRE - PHQ9
1. LITTLE INTEREST OR PLEASURE IN DOING THINGS: 0
SUM OF ALL RESPONSES TO PHQ9 QUESTIONS 1 & 2: 0
2. FEELING DOWN, DEPRESSED OR HOPELESS: 0
SUM OF ALL RESPONSES TO PHQ QUESTIONS 1-9: 0
SUM OF ALL RESPONSES TO PHQ QUESTIONS 1-9: 0

## 2019-12-26 ASSESSMENT — ENCOUNTER SYMPTOMS
COUGH: 0
NAUSEA: 0
CONSTIPATION: 0
ABDOMINAL PAIN: 0
CHEST TIGHTNESS: 0
ALLERGIC/IMMUNOLOGIC NEGATIVE: 1
SHORTNESS OF BREATH: 0
VOMITING: 0
DIARRHEA: 0

## 2020-01-22 RX ORDER — ATORVASTATIN CALCIUM 20 MG/1
TABLET, FILM COATED ORAL
Qty: 90 TABLET | Refills: 3 | Status: SHIPPED | OUTPATIENT
Start: 2020-01-22 | End: 2021-01-06 | Stop reason: SDUPTHER

## 2020-01-22 RX ORDER — LISINOPRIL 20 MG/1
TABLET ORAL
Qty: 90 TABLET | Refills: 3 | Status: SHIPPED | OUTPATIENT
Start: 2020-01-22 | End: 2020-04-22

## 2020-01-22 NOTE — TELEPHONE ENCOUNTER
Refill request for atorvastatin / lisinopril  medication.      Name of Pharmacy- [facility]    Last visit - 12-3-19     Pending visit - 6-4-20    Last refill -1-22-19    Medication Contract signed -   Last Derek barragan-     Additional Comments

## 2020-04-22 RX ORDER — LISINOPRIL 10 MG/1
20 TABLET ORAL DAILY
Qty: 180 TABLET | Refills: 1 | Status: SHIPPED | OUTPATIENT
Start: 2020-04-22 | End: 2021-01-06 | Stop reason: SDUPTHER

## 2020-07-01 ENCOUNTER — HOSPITAL ENCOUNTER (OUTPATIENT)
Age: 45
Discharge: HOME | End: 2020-07-01
Payer: COMMERCIAL

## 2020-07-01 LAB
A/G RATIO: 0.8 (ref 1.1–2.2)
ALBUMIN SERPL-MCNC: 4.1 G/DL (ref 3.4–5)
ALP BLD-CCNC: 82 U/L (ref 40–129)
ALT SERPL-CCNC: 33 U/L (ref 10–40)
ANION GAP SERPL CALCULATED.3IONS-SCNC: 6 MMOL/L (ref 3–16)
AST SERPL-CCNC: 31 U/L (ref 15–37)
BILIRUB SERPL-MCNC: 0.8 MG/DL (ref 0–1)
BUN BLDV-MCNC: 12 MG/DL (ref 7–20)
CALCIUM SERPL-MCNC: 9.1 MG/DL (ref 8.3–10.6)
CHLORIDE BLD-SCNC: 99 MMOL/L (ref 99–110)
CHOLESTEROL, TOTAL: 133 MG/DL (ref 0–199)
CO2: 27 MMOL/L (ref 21–32)
CREAT SERPL-MCNC: 0.8 MG/DL (ref 0.9–1.3)
GFR AFRICAN AMERICAN: >60
GFR NON-AFRICAN AMERICAN: >60
GLOBULIN: 5.2 G/DL
GLUCOSE FASTING: 76 MG/DL (ref 70–99)
HDLC SERPL-MCNC: 31 MG/DL (ref 40–60)
LDL CHOLESTEROL CALCULATED: 65 MG/DL
POTASSIUM SERPL-SCNC: 4.4 MMOL/L (ref 3.5–5.1)
SODIUM BLD-SCNC: 132 MMOL/L (ref 136–145)
TOTAL PROTEIN: 9.3 G/DL (ref 6.4–8.2)
TRIGL SERPL-MCNC: 184 MG/DL (ref 0–150)
VLDLC SERPL CALC-MCNC: 37 MG/DL

## 2020-07-01 PROCEDURE — 36415 COLL VENOUS BLD VENIPUNCTURE: CPT

## 2020-07-01 PROCEDURE — 80053 COMPREHEN METABOLIC PANEL: CPT

## 2020-07-01 PROCEDURE — 80061 LIPID PANEL: CPT

## 2020-07-06 ENCOUNTER — OFFICE VISIT (OUTPATIENT)
Dept: INTERNAL MEDICINE CLINIC | Age: 45
End: 2020-07-06
Payer: COMMERCIAL

## 2020-07-06 VITALS
TEMPERATURE: 97.9 F | SYSTOLIC BLOOD PRESSURE: 138 MMHG | OXYGEN SATURATION: 99 % | DIASTOLIC BLOOD PRESSURE: 82 MMHG | HEIGHT: 66 IN | BODY MASS INDEX: 24.91 KG/M2 | HEART RATE: 77 BPM | WEIGHT: 155 LBS

## 2020-07-06 PROCEDURE — 99214 OFFICE O/P EST MOD 30 MIN: CPT | Performed by: NURSE PRACTITIONER

## 2020-07-06 ASSESSMENT — ENCOUNTER SYMPTOMS
SHORTNESS OF BREATH: 0
CHEST TIGHTNESS: 0
CONSTIPATION: 0
VOMITING: 0
NAUSEA: 0
DIARRHEA: 0
COUGH: 0
SINUS PAIN: 0
SORE THROAT: 0

## 2020-07-06 ASSESSMENT — PATIENT HEALTH QUESTIONNAIRE - PHQ9
SUM OF ALL RESPONSES TO PHQ QUESTIONS 1-9: 0
SUM OF ALL RESPONSES TO PHQ QUESTIONS 1-9: 0
2. FEELING DOWN, DEPRESSED OR HOPELESS: 0
1. LITTLE INTEREST OR PLEASURE IN DOING THINGS: 0
SUM OF ALL RESPONSES TO PHQ9 QUESTIONS 1 & 2: 0

## 2020-07-06 NOTE — PROGRESS NOTES
[address] [facility]  [address]  Tel:[phone]    Jermaine Beckford is a 53 y.o. male who presents today for hismedical conditions/complaints as noted below. Jermaine Beckford is c/o of Annual Exam    Chief Complaint   Patient presents with    Annual Exam     HPI:     Mr. Veleta Severe presents for his annual check up and to discuss his BP/Lipids. Overall he states he is doing well. Does complain of continued right knee pain. Has seen [facility] and received imaging, PT with no improvement. Has been slightly more lax on his exercise seeing the COVID outbreak, however previously was running regularly. Does not take NSAID currently. No other current treatments for knee. Treatment Adherence:   Medication compliance:  compliant all of the time  Diet compliance:  compliant all of the time  Weight trend: stable  Current exercise: Exercise has been put on hold seeing COVID outbreak  Barriers: none    Hypertension:  Home blood pressure monitoring: No. Patient denies chest pain, shortness of breath, headache, lightheadedness, blurred vision, peripheral edema, palpitations, dry cough and fatigue. Antihypertensive medication side effects: no medication side effects noted. Use of agents associated with hypertension: none.                                         Sodium (mmol/L)       Date                     Value                 07/01/2020               132 (L)          ---------- BUN (mg/dL)       Date                     Value                 07/01/2020               12               ---------- Glucose (mg/dL)       Date                     Value                 01/18/2019               92               ----------  Potassium (mmol/L)       Date                     Value                 07/01/2020               4.4              ---------- CREATININE (mg/dL)       Date                     Value                 07/01/2020 0.8 (L)          ----------     Hyperlipidemia:  No new myalgias or GI upset on atorvastatin (Lipitor). Lab Results       Component                Value               Date                       CHOL                     133                 07/01/2020                 TRIG                     184 (H)             07/01/2020                 HDL                      31 (L)              07/01/2020                 LDLCALC                  65                  07/01/2020            Lab Results       Component                Value               Date                       ALT                      33                  07/01/2020                 AST                      31                  07/01/2020                Subjective:     Review of Systems   Constitutional: Negative for fever. HENT: Negative for sinus pain and sore throat. Respiratory: Negative for cough, chest tightness and shortness of breath. Cardiovascular: Negative for chest pain and palpitations. Gastrointestinal: Negative for constipation, diarrhea, nausea and vomiting. Genitourinary: Negative for dysuria and urgency. Skin: Negative for rash. Neurological: Negative for dizziness and weakness. Objective:     Vitals:    07/06/20 0755   BP: 138/82   Site: Right Upper Arm   Position: Sitting   Cuff Size: Medium Adult   Pulse: 77   Temp: 97.9 °F (36.6 °C)   TempSrc: Infrared   SpO2: 99%   Weight: 155 lb (70.3 kg)   Height: 5' 6\" (1.676 m)       Physical Exam  Vitals signs and nursing note reviewed. Constitutional:       Appearance: Normal appearance. He is well-developed. HENT:      Head: Normocephalic and atraumatic. Right Ear: Hearing and external ear normal.      Left Ear: Hearing and external ear normal.      Nose: Nose normal.   Eyes:      General: Lids are normal. Lids are everted, no foreign bodies appreciated. Conjunctiva/sclera: Conjunctivae normal.      Pupils: Pupils are equal, round, and reactive to light.    Neck: Musculoskeletal: Full passive range of motion without pain, normal range of motion and neck supple. Thyroid: No thyroid mass. Vascular: Normal carotid pulses. No carotid bruit or JVD. Cardiovascular:      Rate and Rhythm: Normal rate and regular rhythm. No extrasystoles are present. Chest Wall: PMI is not displaced. Pulses: Normal pulses. Radial pulses are 2+ on the right side and 2+ on the left side. Dorsalis pedis pulses are 2+ on the right side and 2+ on the left side. Heart sounds: Normal heart sounds, S1 normal and S2 normal. Heart sounds not distant. No murmur. No systolic murmur. No diastolic murmur. No friction rub. No gallop. No S3 or S4 sounds. Pulmonary:      Effort: Pulmonary effort is normal. No accessory muscle usage or respiratory distress. Breath sounds: Normal breath sounds. No decreased breath sounds, wheezing, rhonchi or rales. Abdominal:      General: Bowel sounds are normal. There is no distension. Palpations: Abdomen is soft. Tenderness: There is no abdominal tenderness. There is no rebound. Musculoskeletal: Normal range of motion. Skin:     General: Skin is warm and dry. Neurological:      Mental Status: He is alert. Cranial Nerves: No cranial nerve deficit. Psychiatric:         Speech: Speech normal.         Behavior: Behavior normal.         Thought Content: Thought content normal.         Judgment: Judgment normal.       Assessment & Plan: The following diagnoses and conditions are stable with no further orders unlessindicated:    1. Chronic pain of right knee    2. Hyperlipidemia, mixed    3. Essential hypertension    4. Low vitamin D level      Lenette Labs was seen today for annual exam.    Diagnoses and all orders for this visit:    Chronic pain of right knee  -     MRI KNEE RIGHT WO CONTRAST; Future    Patient requests MRI of right knee to investigate possibility of soft tissue injury.  Has attempted PT, NSAIDs in the past without success. Encouraged stretching in the AM to loosen tightness/inflammation. Can trial low dose NSAID for relief of inflammation. Avoid heavy impact exercises for the time being. Hyperlipidemia, mixed  -     LIPID PANEL; Future    Lipids well controlled. Recommend increasing light cardio type activities to help lower trigs and increase HDL. Encouraged decreasing fatty, cholesterol rich foods in the diet (I.e. Red meats, butter, whole fat milk). Dietary choices like olive oil instead of butter, baked foods instead of fried can help to further prevent future elevations. Foods high in omega fatty acids can help to further decrease lipids additionally. Emphasis placed on incorporating fish, lean proteins (chicken, turkey, pork), fresh fruits and vegetables. Essential hypertension  -     CBC Auto Differential; Future    Continue lisinopril. Encouraged obtaining BP cuff and taking BP at least 3 x weekly. Encouraged decreasing sodium in the diet, weight loss, and gradual increases in exercise at least 20 minutes daily to further benefit BP    Low vitamin D level  -     Vitamin D 25 Hydroxy; Future    Consider daily multivitamin with vit d focus. Return in about 6 months (around 1/6/2021). Patient should call the office immediately with new or ongoing signs or symptoms or worsening, or proceed to the emergency room. If you are onmedications which could impair your senses, you are at risk of weakness, falls, dizziness, or drowsiness. You should be careful during activities which could place you at risk of harm, such as climbing, using stairs,operating machinery, or driving vehicles. If you feel you cannot safely do these activities, you should request others to help you, or avoid the activities altogether. If you are drowsy for any other reason, you should usethe same precautions as listed above. Call if pattern of symptoms change or persists for an extended time.     Soheila Perry, FNP-C

## 2021-01-06 ENCOUNTER — OFFICE VISIT (OUTPATIENT)
Dept: INTERNAL MEDICINE CLINIC | Age: 46
End: 2021-01-06
Payer: COMMERCIAL

## 2021-01-06 VITALS
HEIGHT: 66 IN | OXYGEN SATURATION: 100 % | DIASTOLIC BLOOD PRESSURE: 74 MMHG | BODY MASS INDEX: 21.53 KG/M2 | SYSTOLIC BLOOD PRESSURE: 118 MMHG | HEART RATE: 80 BPM | TEMPERATURE: 98.1 F | WEIGHT: 134 LBS

## 2021-01-06 DIAGNOSIS — I10 ESSENTIAL HYPERTENSION: Primary | ICD-10-CM

## 2021-01-06 DIAGNOSIS — E78.2 HYPERLIPIDEMIA, MIXED: ICD-10-CM

## 2021-01-06 PROCEDURE — 99213 OFFICE O/P EST LOW 20 MIN: CPT | Performed by: NURSE PRACTITIONER

## 2021-01-06 RX ORDER — ATORVASTATIN CALCIUM 20 MG/1
TABLET, FILM COATED ORAL
Qty: 90 TABLET | Refills: 3 | Status: SHIPPED | OUTPATIENT
Start: 2021-01-06 | End: 2022-01-24

## 2021-01-06 RX ORDER — LISINOPRIL 20 MG/1
20 TABLET ORAL DAILY
Qty: 90 TABLET | Refills: 1 | Status: SHIPPED | OUTPATIENT
Start: 2021-01-06 | End: 2021-06-15

## 2021-01-06 SDOH — ECONOMIC STABILITY: FOOD INSECURITY: WITHIN THE PAST 12 MONTHS, YOU WORRIED THAT YOUR FOOD WOULD RUN OUT BEFORE YOU GOT MONEY TO BUY MORE.: NEVER TRUE

## 2021-01-06 ASSESSMENT — ENCOUNTER SYMPTOMS
NAUSEA: 0
CONSTIPATION: 0
SORE THROAT: 0
SINUS PAIN: 0
CHEST TIGHTNESS: 0
COUGH: 0
SHORTNESS OF BREATH: 0
VOMITING: 0
DIARRHEA: 0

## 2021-01-06 ASSESSMENT — PATIENT HEALTH QUESTIONNAIRE - PHQ9: SUM OF ALL RESPONSES TO PHQ QUESTIONS 1-9: 0

## 2021-01-06 NOTE — PROGRESS NOTES
[address]  [address] [facility]  [address]  Tel:[phone]    Mariana Napier is a 50 y.o. male who presents today for his medical conditions/complaints as noted below. Mariana Napier is c/o of Hyperlipidemia and Gastroesophageal Reflux    Chief Complaint   Patient presents with    Hyperlipidemia    Gastroesophageal Reflux       HPI:     Norman Myers presents today to discuss his BP/Lipids. Overall he states he is doing well. Has been focusing on his health and exercise to lose excess weight. Down approx 15 lbs through lap swimming. Knee pain has been improving as a result. Denies issues with medications currently. BP stated to be stable at home. Plans on going on sailing trip with son mid year with his [address]. Treatment Adherence:   Medication compliance:  compliant all of the time  Diet compliance:  compliant all of the time  Weight trend: decreasing  Current exercise: aerobics 4 time(s) per week  Barriers: none    Hypertension:  Home blood pressure monitoring: Yes - intermittently. Patient denies chest pain, shortness of breath, headache, lightheadedness, blurred vision, peripheral edema, palpitations, dry cough and fatigue. Antihypertensive medication side effects: no medication side effects noted. Use of agents associated with hypertension: none.                                         Sodium (mmol/L)       Date                     Value                 07/01/2020               132 (L)          ---------- BUN (mg/dL)       Date                     Value                 07/01/2020               12               ---------- Glucose (mg/dL)       Date                     Value                 01/18/2019               92               ----------  Potassium (mmol/L)       Date                     Value                 07/01/2020               4.4              ---------- CREATININE (mg/dL)       Date Value                 07/01/2020               0.8 (L)          ----------     Hyperlipidemia:  No new myalgias or GI upset on atorvastatin (Lipitor).      Lab Results       Component                Value               Date                       CHOL                     133                 07/01/2020                 TRIG                     184 (H)             07/01/2020                 HDL                      31 (L)              07/01/2020                 LDLCALC                  65                  07/01/2020            Lab Results       Component                Value               Date                       ALT                      33                  07/01/2020                 AST                      31                  07/01/2020                   Past Medical History:   Diagnosis Date    Allergic rhinitis     GERD (gastroesophageal reflux disease)     HTN (hypertension) 9/26/2013    Hyperlipidemia     Lumbar spondylolysis     Vitamin D deficiency       Past Surgical History:   Procedure Laterality Date    HERNIA REPAIR  as infant    Bilaterally    VASECTOMY  2010    Dr. Christi Saravia History   Problem Relation Age of Onset    Hypertension Mother     Stroke Mother     High Cholesterol Mother     Depression Mother     Anxiety Disorder Mother     High Cholesterol Father     Prostate Cancer Father 77        s/p prostatectomy, no other treatment needed    Crohn's Disease Other      Social History     Tobacco Use    Smoking status: Never Smoker    Smokeless tobacco: Never Used   Substance Use Topics    Alcohol use: Yes     Comment: socially        Current Outpatient Medications   Medication Sig Dispense Refill    zinc 50 MG CAPS Take by mouth      lisinopril (PRINIVIL;ZESTRIL) 20 MG tablet Take 1 tablet by mouth daily 90 tablet 1    atorvastatin (LIPITOR) 20 MG tablet TAKE 1 TABLET DAILY 90 tablet 3    Omega-3 Fatty Acids (FISH OIL PO) Take by mouth      Cholecalciferol (VITAMIN D PO) Take 4,000 Int'l Units by mouth daily. No current facility-administered medications for this visit. No Known Allergies    Subjective:      Review of Systems   Constitutional: Negative for fever. HENT: Negative for sinus pain and sore throat. Respiratory: Negative for cough, chest tightness and shortness of breath. Cardiovascular: Negative for chest pain and palpitations. Gastrointestinal: Negative for constipation, diarrhea, nausea and vomiting. Genitourinary: Negative for dysuria and urgency. Skin: Negative for rash. Neurological: Negative for dizziness and weakness. Objective:     Vitals:    01/06/21 0753 01/06/21 0829   BP: 130/78 118/74   Site: Left Upper Arm    Position: Sitting    Cuff Size: Medium Adult    Pulse: 80    Temp: 98.1 °F (36.7 °C)    TempSrc: Infrared    SpO2: 100%    Weight: 134 lb (60.8 kg)    Height: 5' 6\" (1.676 m)      Physical Exam  Vitals signs and nursing note reviewed. Constitutional:       Appearance: Normal appearance. He is well-developed. HENT:      Head: Normocephalic and atraumatic. Right Ear: Hearing and external ear normal.      Left Ear: Hearing and external ear normal.      Nose: Nose normal.   Eyes:      General: Lids are normal.      Pupils: Pupils are equal, round, and reactive to light. Neck:      Musculoskeletal: Normal range of motion. Cardiovascular:      Rate and Rhythm: Normal rate and regular rhythm. No extrasystoles are present. Chest Wall: PMI is not displaced. Pulses: Normal pulses. Heart sounds: Normal heart sounds, S1 normal and S2 normal. Heart sounds not distant. No murmur. No systolic murmur. No diastolic murmur. No friction rub. No gallop. No S3 or S4 sounds. Pulmonary:      Effort: Pulmonary effort is normal. No accessory muscle usage or respiratory distress. Breath sounds: Normal breath sounds. No decreased breath sounds, wheezing, rhonchi or rales.    Abdominal: General: Bowel sounds are normal.      Palpations: Abdomen is soft. Skin:     General: Skin is warm and dry. Neurological:      Mental Status: He is alert. Psychiatric:         Speech: Speech normal.       Assessment & Plan: The following diagnoses and conditions are stable with no further orders unless indicated:    1. Essential hypertension    2. Hyperlipidemia, mixed      Danay Carter was seen today for hyperlipidemia and gastroesophageal reflux. Diagnoses and all orders for this visit:    Essential hypertension  -     CBC Auto Differential; Future  -     Comprehensive Metabolic Panel; Future  -     lisinopril (PRINIVIL;ZESTRIL) 20 MG tablet; Take 1 tablet by mouth daily  Continue current regimen as BP well controlled. No dose change needed. Continue healthy exercise. Hyperlipidemia, mixed  -     atorvastatin (LIPITOR) 20 MG tablet; TAKE 1 TABLET DAILY  -     Lipid Panel; Future    Plan to recheck lipids in 6 months. Recent weight loss/dietary improvement likely helping. Encouraged decreasing fatty, cholesterol rich foods in the diet (I.e. Red meats, butter, whole fat milk). Dietary choices like olive oil instead of butter, baked foods instead of fried can help to further prevent future elevations. Foods high in omega fatty acids can help to further decrease lipids additionally. Emphasis placed on incorporating fish, lean proteins (chicken, turkey, pork), fresh fruits and vegetables. Total time spent reviewing patient chart, vitals, assessment, documentation: 27 minutes    Return in about 6 months (around 7/6/2021) for HTN/HLD follow up, Annual Health Check. Patientshould call the office immediately with new or ongoing signs or symptoms or worsening, or proceed to the emergency room. If you are on medications which could impair your senses, you are at risk of weakness, falls,dizziness, or drowsiness.   You should be careful during activities which could place you at risk of harm, such as climbing, using stairs, operating machinery, or driving vehicles. If you feel you cannot safely do theseactivities, you should request others to help you, or avoid the activities altogether. If you are drowsy for any other reason, you should use the same precautions as listed above. Call if pattern of symptoms change or persists for an extended time.       Catalinamercy Solizes

## 2021-01-29 ENCOUNTER — TELEPHONE (OUTPATIENT)
Dept: INTERNAL MEDICINE CLINIC | Age: 46
End: 2021-01-29

## 2021-02-02 ENCOUNTER — TELEPHONE (OUTPATIENT)
Dept: INTERNAL MEDICINE CLINIC | Age: 46
End: 2021-02-02

## 2021-02-02 NOTE — TELEPHONE ENCOUNTER
[facility] calling because patient has appt for MRI this coming Friday at 7:00 am. A Peer to Peer approval is needed, and asks ordering PCP to call [phone]  ID# [id number]     [employer] also ask for a call back once the approval has been given  [phone]

## 2021-06-04 ENCOUNTER — OFFICE VISIT (OUTPATIENT)
Dept: INTERNAL MEDICINE CLINIC | Age: 46
End: 2021-06-04
Payer: COMMERCIAL

## 2021-06-04 VITALS
HEART RATE: 73 BPM | WEIGHT: 142 LBS | SYSTOLIC BLOOD PRESSURE: 132 MMHG | BODY MASS INDEX: 22.29 KG/M2 | TEMPERATURE: 97.1 F | OXYGEN SATURATION: 98 % | HEIGHT: 67 IN | DIASTOLIC BLOOD PRESSURE: 68 MMHG

## 2021-06-04 DIAGNOSIS — R19.09 INGUINAL BULGE: Primary | ICD-10-CM

## 2021-06-04 PROCEDURE — 99213 OFFICE O/P EST LOW 20 MIN: CPT | Performed by: NURSE PRACTITIONER

## 2021-06-04 ASSESSMENT — ENCOUNTER SYMPTOMS
SINUS PAIN: 0
SORE THROAT: 0
VOMITING: 0
NAUSEA: 0
DIARRHEA: 0
COUGH: 0
SHORTNESS OF BREATH: 0
CHEST TIGHTNESS: 0
CONSTIPATION: 0

## 2021-06-04 NOTE — PROGRESS NOTES
[address]  94 [facility]  [address]  Tel:[phone]    Cherise Luna is a 50 y.o. male who presents today for his medical conditions/complaints as noted below. Cherise Luna is c/o of Mass (lower abd )    Chief Complaint   Patient presents with    Mass     lower abd        HPI:     Mr. Sussy Barrera presents with concerns of groin bulging that started overnight into this AM. He states he has been exercising and lifting heavy weights recently which he feels may have caused a hernia. Symptoms began as painless swelling of the left inguinal hernia. Lump is not reducible. Denies pain, recent illness, muscle tension in the area. Pt has no symptoms of  chronic constipation, chronic cough, difficulty urinating.  Pt. had previous history of groin surgery (hernia as infant)        Past Medical History:   Diagnosis Date    Allergic rhinitis     GERD (gastroesophageal reflux disease)     HTN (hypertension) 9/26/2013    Hyperlipidemia     Lumbar spondylolysis     Vitamin D deficiency         Past Surgical History:   Procedure Laterality Date    HERNIA REPAIR  as infant    Bilaterally    VASECTOMY  2010    Dr. Cherelle Ford History   Problem Relation Age of Onset    Hypertension Mother     Stroke Mother     High Cholesterol Mother     Depression Mother     Anxiety Disorder Mother     High Cholesterol Father     Prostate Cancer Father 77        s/p prostatectomy, no other treatment needed    Crohn's Disease Other        Social History     Tobacco Use    Smoking status: Never Smoker    Smokeless tobacco: Never Used   Substance Use Topics    Alcohol use: Yes     Comment: socially        Current Outpatient Medications   Medication Sig Dispense Refill    zinc 50 MG CAPS Take by mouth      lisinopril (PRINIVIL;ZESTRIL) 20 MG tablet Take 1 tablet by mouth daily 90 tablet 1    atorvastatin (LIPITOR) 20 MG tablet TAKE 1 TABLET DAILY 90 tablet 3    Omega-3 Fatty Acids (FISH OIL PO) Take by mouth      Cholecalciferol (VITAMIN D PO) Take 4,000 Int'l Units by mouth daily. No current facility-administered medications for this visit. No Known Allergies    Subjective:      Review of Systems   Constitutional: Negative for fever. HENT: Negative for sinus pain and sore throat. Respiratory: Negative for cough, chest tightness and shortness of breath. Cardiovascular: Negative for chest pain and palpitations. Gastrointestinal: Negative for constipation, diarrhea, nausea and vomiting. Genitourinary: Negative for dysuria and urgency. Swelling of left groin   Skin: Negative for rash. Neurological: Negative for dizziness and weakness. Objective:     Vitals:    06/04/21 0928   BP: 132/68   Pulse: 73   Temp: 97.1 °F (36.2 °C)   SpO2: 98%   Weight: 142 lb (64.4 kg)   Height: 5' 6.5\" (1.689 m)       Physical Exam  Vitals and nursing note reviewed. Constitutional:       Appearance: Normal appearance. He is well-developed. HENT:      Head: Normocephalic and atraumatic. Right Ear: Hearing and external ear normal.      Left Ear: Hearing and external ear normal.      Nose: Nose normal.   Eyes:      General: Lids are normal.      Pupils: Pupils are equal, round, and reactive to light. Cardiovascular:      Rate and Rhythm: Normal rate and regular rhythm. No extrasystoles are present. Chest Wall: PMI is not displaced. Pulses: Normal pulses. Heart sounds: Normal heart sounds, S1 normal and S2 normal. Heart sounds not distant. No murmur heard. No systolic murmur is present. No diastolic murmur is present. No friction rub. No gallop. No S3 or S4 sounds. Pulmonary:      Effort: Pulmonary effort is normal. No accessory muscle usage or respiratory distress. Breath sounds: Normal breath sounds. No decreased breath sounds, wheezing, rhonchi or rales. Abdominal:      General: Bowel sounds are normal.      Palpations: Abdomen is soft. Hernia: A hernia is present. Hernia is present in the left inguinal area. There is no hernia in the right inguinal area. Genitourinary:     Penis: Normal and circumcised. Testes: Normal.   Musculoskeletal:      Cervical back: Normal range of motion. Lymphadenopathy:      Lower Body: No right inguinal adenopathy. No left inguinal adenopathy. Skin:     General: Skin is warm and dry. Neurological:      Mental Status: He is alert. Psychiatric:         Speech: Speech normal.         Assessment & Plan: The following diagnoses and conditions are stable with no further orders unless indicated:    1. Inguinal bulge        Parish Cueto was seen today for mass. Diagnoses and all orders for this visit:    Inguinal bulge  -     Adriana Madrigal MD, General Surgery, [facility]    Symptoms suspicious for hernia given recent causative factors. Will provide referral to gen surg for management. Can take ibuprofen/tylenol if painful. Avoid heavy lifting/increased in abdominal pressure to prevent worsening. No follow-ups on file. Patientshould call the office immediately with new or ongoing signs or symptoms or worsening, or proceed to the emergency room. If you are on medications which could impair your senses, you are at risk of weakness, falls,dizziness, or drowsiness. You should be careful during activities which could place you at risk of harm, such as climbing, using stairs, operating machinery, or driving vehicles. If you feel you cannot safely do theseactivities, you should request others to help you, or avoid the activities altogether. If you are drowsy for any other reason, you should use the same precautions as listed above. Call if pattern of symptoms change or persists for an extended time.       Renetta Verde

## 2021-06-10 ENCOUNTER — TELEPHONE (OUTPATIENT)
Dept: SURGERY | Age: 46
End: 2021-06-10

## 2021-06-10 ENCOUNTER — INITIAL CONSULT (OUTPATIENT)
Dept: SURGERY | Age: 46
End: 2021-06-10
Payer: COMMERCIAL

## 2021-06-10 VITALS
HEIGHT: 67 IN | SYSTOLIC BLOOD PRESSURE: 136 MMHG | WEIGHT: 142 LBS | BODY MASS INDEX: 22.29 KG/M2 | HEART RATE: 69 BPM | DIASTOLIC BLOOD PRESSURE: 75 MMHG

## 2021-06-10 DIAGNOSIS — K40.90 LEFT INGUINAL HERNIA: Primary | ICD-10-CM

## 2021-06-10 PROCEDURE — 99244 OFF/OP CNSLTJ NEW/EST MOD 40: CPT | Performed by: SURGERY

## 2021-06-10 NOTE — PROGRESS NOTES
75        s/p prostatectomy, no other treatment needed    Crohn's Disease Other        REVIEW OF SYSTEMS:  CONSTITUTIONAL:  negative  HEENT:  negative  RESPIRATORY:  negative  CARDIOVASCULAR:  negative  GASTROINTESTINAL:  negative  GENITOURINARY:  negative  HEMATOLOGIC/LYMPHATIC:  negative  NEUROLOGICAL:  Negative  * All other ROS reviewed and negative. PHYSICAL EXAM:  VITALS:  /75   Pulse 69   Ht 5' 6.5\" (1.689 m)   Wt 142 lb (64.4 kg)   BMI 22.58 kg/m²   24HR INTAKE/OUTPUT:    [unfilled]  @[facility]@      CONSTITUTIONAL:  alert, no apparent distress and normal weight  EYES:  PERRL, sclera clear  ENT:  Normocephalic,atraumatic, without obvious abnormality  NECK:  supple, symmetrical, trachea midline  LUNGS: Resp effort easy and unlabored  CARDIOVASCULAR:  NO JVD, regular rate  ABDOMEN:  , normal bowel sounds, soft, non-distended, non-tender, voluntary guarding absent, no masses palpated and hernia present left groin reducible  MUSCULOSKELETAL: No clubbing or cyanosis, 0+ pitting edema lower extremities  NEUROLOGIC:  Mental Status Exam:  Level of Alertness:   awake  PSYCHIATRIC:   person, place, time  SKIN:  no bruising or bleeding    DATA:    CBC: No results for input(s): WBC, HGB, HCT, PLT in the last 72 hours. BMP:  No results for input(s): NA, K, CL, CO2, BUN, CREATININE, GLUCOSE in the last 72 hours. Hepatic: No results for input(s): AST, ALT, ALB, BILITOT, ALKPHOS in the last 72 hours. Mag:    No results for input(s): MG in the last 72 hours. Phos:   No results for input(s): PHOS in the last 72 hours. INR: No results for input(s): INR in the last 72 hours. Radiology Review: Images personally reviewed by me. NA      IMPRESSION/RECOMMENDATIONS:    51 yo with recurrent left inguinal hernia  1. Discussed his diagnosis and indications for repair  2. Risks of operation and typical postop recovery reviewed  3.   Plan for robotic repair soon    Electronically signed by Skylar Campoverde Kymberly Guajardo MD     [address]

## 2021-06-10 NOTE — TELEPHONE ENCOUNTER
Called patient, scheduled for robotic left inguinal hernia repair with mesh, possible right, possible open on 6/23/21 at 36 am with an arrival of 6 am. Informed the patient that PAT will also call with further instructions. Patient verbally states that he/she understands pre-op instructions. Patient notified of pre-op instructions for general/mac anesthesia:    *NPO after midnight     *H&P prior to surgery - yes    *Cardiac clearance, if needed. - n/a    *Stop all blood thinners, if needed. - n/a    *Will need a     *Call the office with any further questions. *COVID testing 5-7 days priors to procedure surgery. *Procedure/Surgery time at this point is tentative time as PAT will confirm arrival time.     Date of COVID vaccine completion: 2/2021

## 2021-06-10 NOTE — LETTER
[facility]  [address]  Phone: [phone]  Fax: [phone]           Sylvia Guardado MD      Altagracia 10, 2021     Patient: Vandana Newby   MR Number: <[id number]>   YOB: 1975   Date of Visit: 6/10/2021       Dear Dr. Shwetha Carr: Thank you for referring Indra Boss to me for evaluation/treatment. Below are the relevant portions of my assessment and plan of care. If you have questions, please do not hesitate to call me. I look forward to following Sarai Braxton along with you.     Sincerely,    MD Sylvia Gaytan MD    CC providers:  BRINDA Arreguin - CNP  [address] [place] [id number]  [address]

## 2021-06-10 NOTE — LETTER
Surgery Scheduling Form:  DEMOGRAPHICS:                                                                                                         .  Patient Name:  Rico Granger  Patient :  1975   Patient SS#:      Patient Phone:  [phone] (home)                         Alt. Patient Phone:                     Patient Address:  [address] [id number] [place] [id number]    PCP:  Areli Sullivan MD  Insurance:  Payor: [facility] / Plan: [address] [place] [address] HMO / Product Type: *No Product type* /        Insurance ID Number:    Payor/Plan Subscr  Sex Relation Sub. Ins. ID Effective Group Num   1. [id number] Cantrell 1972 Female Spouse [id number]                                   [address]     Interpretor Needed:  (NO)  (TYPE)           LATEX ALLERGY:  (NO)  Allergies: no known drug allergies  Defibulator or Pacemaker:  (NO)    DIAGNOSIS & PROCEDURE:                                                                                       .  Diagnosis Code/Description:   Inguinal hernia K40.90  Operation Code/Description:  Robotic inguinal hernia repair with mesh, possible right, possible open [address]  Location:  [facility]              Surgeon:  Dr. Germain Ford    SCHEDULING INFORMATION:                                                                                    .  Surgeon's Scheduling Instruction:  elective  Requested Date: 21     OR Time: 730 am            Patient Arrival Time: 6 am  OR Time Required:  90  Minutes  Anesthesia:  General       Equipment:  n/a                                                            SA Required (only for Mac and Gen): yes  Status:  Outpatient        Standard C-Arm (only for port and pam):  n/a   Mini C-Arm: No   PAT Required:   Yes                                          H&P needs to be completed: yes  Cardiac Clearance Requested:  (NO)  Covid vaccine completion: 2021               PRE-CERTIFICATION INFORMATION:                                                                           .  Procedure/CPT code:  Robotic inguinal hernia repair with mesh, possible right, possible open [address]        Modifier:

## 2021-06-10 NOTE — PROGRESS NOTES
Jess Boast    Age 50 y.o.    male    1975    MRN [id number]    6/23/2021  Arrival Time_____________  OR Time____________[address]     Procedure(s):  ROBOTIC INGUINAL HERNIA REPAIR WITH MESH, POSSIBLE RIGHT, POSSIBLE OPEN                      General     Surgeon(s):  Lore Lamb, MD      DAY ADMIT ___  SDS/OP ___  OUTPT IN BED ___         Phone [phone] (home)    PCP _____________________ Phone_________________ Epic ( ) Epic CE ( ) Appt ________    ADDITIONAL INFO __________________________________ Cardio/Consult _____________    NOTES _____________________________________________________________________    ____________________________________________________________________________    PAT APPT DATE:________ TIME: ________  FAXED QAD: _______  (__) H&P w/ hospitalist  ____________________________________________________________________________    COVID TEST: Date/Location______________        NURSING HISTORY COMPLETE: _______  (__) CBC       (__) W/ DIFF ___________  (__)  ECHO    __________  (__) Hgb A1C    ___________  (__) CHEST X RAY   __________  (__) LIPID PROFILE  ___________  (__) EKG   __________  (__) PT/PTT   ___________  (__) PFT's   __________  (__) BMP   ___________  (__) CAROTIDS  __________  (__) CMP   ___________  (__) VEIN MAPPING  __________  (__) U/A   ___________  (__) HISTORY & PHYSICAL __________  (__) URINE C & S  ___________  (__) CARDIAC CLEARANCE __________  (__) U/A W/ FLEX  ___________  (__) PULM.  CLEARANCE __________  (__) SERUM PREGNANCY ___________  (__) Check Epic DOS orders __________  (__) TYPE & SCREEN ________ repeat ( ) (__)  __________________ __________  (__) ALBUMIN   ___________  (__)  __________________ __________  (__) TRANSFERRIN  ___________  (__)  __________________ __________  (__) LIVER PROFILE  ___________  (__)  __________________ __________  (__) CARBOXY HGB  ___________  (__) URINE PREG DOS __________  (__) NICOTINE & MET.

## 2021-06-14 NOTE — TELEPHONE ENCOUNTER
Refill request for lisinopril medication.      Name of [facility]acy- [facility]      Last visit - 6-4-21     Pending visit - 6-21-21    Last refill -4-6-21      Medication Contract signed -   Guerrero barragan-         Additional Comments

## 2021-06-15 RX ORDER — LISINOPRIL 20 MG/1
20 TABLET ORAL DAILY
Qty: 90 TABLET | Refills: 3 | Status: SHIPPED | OUTPATIENT
Start: 2021-06-15 | End: 2022-02-14 | Stop reason: SDUPTHER

## 2021-06-21 ENCOUNTER — ANESTHESIA EVENT (OUTPATIENT)
Dept: OPERATING ROOM | Age: 46
End: 2021-06-21
Payer: COMMERCIAL

## 2021-06-21 ENCOUNTER — OFFICE VISIT (OUTPATIENT)
Dept: INTERNAL MEDICINE CLINIC | Age: 46
End: 2021-06-21

## 2021-06-21 VITALS
WEIGHT: 142 LBS | HEIGHT: 67 IN | HEART RATE: 70 BPM | OXYGEN SATURATION: 99 % | TEMPERATURE: 97.9 F | BODY MASS INDEX: 22.29 KG/M2 | SYSTOLIC BLOOD PRESSURE: 122 MMHG | DIASTOLIC BLOOD PRESSURE: 64 MMHG

## 2021-06-21 DIAGNOSIS — Z01.818 PREOP EXAMINATION: Primary | ICD-10-CM

## 2021-06-21 PROCEDURE — 99242 OFF/OP CONSLTJ NEW/EST SF 20: CPT | Performed by: NURSE PRACTITIONER

## 2021-06-21 PROCEDURE — 93000 ELECTROCARDIOGRAM COMPLETE: CPT | Performed by: NURSE PRACTITIONER

## 2021-06-21 RX ORDER — MULTIVIT-MIN/IRON/FOLIC ACID/K 18-600-40
1 CAPSULE ORAL DAILY
COMMUNITY
End: 2022-08-29

## 2021-06-21 ASSESSMENT — ENCOUNTER SYMPTOMS
COUGH: 0
SINUS PAIN: 0
SHORTNESS OF BREATH: 0
CHEST TIGHTNESS: 0
NAUSEA: 0
VOMITING: 0
DIARRHEA: 0
CONSTIPATION: 0
SORE THROAT: 0

## 2021-06-21 NOTE — PROGRESS NOTES
[facility]  94 [facility]  [address] [place] [address] Drive  Dept: [phone]      Preoperative Consultation      Kimmie Mattson  YOB: 1975    Date of Service:  6/21/2021    Vitals:    06/21/21 0800   BP: 122/64   Site: Right Upper Arm   Position: Sitting   Cuff Size: Large Adult   Pulse: 70   Temp: 97.9 °F (36.6 °C)   TempSrc: Temporal   SpO2: 99%   Weight: 142 lb (64.4 kg)   Height: 5' 6.5\" (1.689 m)      Wt Readings from Last 2 Encounters:   06/21/21 142 lb (64.4 kg)   06/10/21 142 lb (64.4 kg)     BP Readings from Last 3 Encounters:   06/21/21 122/64   06/10/21 136/75   06/04/21 132/68        Chief Complaint   Patient presents with    Pre-op Exam     inguinal hernia repair / Pati Calvert : 6-/ Dr. Mauri Dykes. / [phone]     No Known Allergies  Outpatient Medications Marked as Taking for the 6/21/21 encounter (Office Visit) with BRINDA Quinones CNP   Medication Sig Dispense Refill    lisinopril (PRINIVIL;ZESTRIL) 20 MG tablet TAKE 1 TABLET BY MOUTH  DAILY 90 tablet 3    zinc 50 MG CAPS Take by mouth      atorvastatin (LIPITOR) 20 MG tablet TAKE 1 TABLET DAILY 90 tablet 3    Omega-3 Fatty Acids (FISH OIL PO) Take by mouth      Cholecalciferol (VITAMIN D PO) Take 4,000 Int'l Units by mouth daily. This patient presents to the office today for a preoperative consultation at the request of surgeon, Dr. John Bradshaw, who plans on performing left inguinal hernia on June 23 at [facility].  The current problem began several weeks ago, and symptoms have been unchanged with time. Conservative therapy: N/A. Planned anesthesia: General   Known anesthesia problems: PONV  Bleeding risk: No recent or remote history of abnormal bleeding  Personal or FH of DVT/PE: No    Patient objection to receiving blood products: No      RCRI       +1 +0    1.) High-Risk Surgery      []   [x]     ? Intraperitoneal   ? Intrathoracic   ?  Suprainguinal vascular     2.) Diagnosis Date    Allergic rhinitis     GERD (gastroesophageal reflux disease)     HTN (hypertension) 9/26/2013    Hyperlipidemia     Lumbar spondylolysis     Vitamin D deficiency      Past Surgical History:   Procedure Laterality Date    HERNIA REPAIR  as infant    Bilaterally    VASECTOMY  2010    Dr. Samantha Cintron History   Problem Relation Age of Onset    Hypertension Mother     Stroke Mother     High Cholesterol Mother     Depression Mother     Anxiety Disorder Mother     High Cholesterol Father     Prostate Cancer Father 77        s/p prostatectomy, no other treatment needed    Other Maternal Grandfather         Clotting disorder    Crohn's Disease Other      Social History     Socioeconomic History    Marital status:      Spouse name: Not on file    Number of children: Not on file    Years of education: Not on file    Highest education level: Not on file   Occupational History    Not on file   Tobacco Use    Smoking status: Never Smoker    Smokeless tobacco: Never Used   Vaping Use    Vaping Use: Never used   Substance and Sexual Activity    Alcohol use: Yes     Comment: socially    Drug use: No    Sexual activity: Yes     Partners: Female     Comment: single partner (wife)   Other Topics Concern    Not on file   Social History Narrative    Not on file     Social Determinants of Health     Financial Resource Strain: Low Risk     Difficulty of Paying Living Expenses: Not hard at all   Food Insecurity: No Food Insecurity    Worried About Running Out of Food in the Last Year: Never true    [address] in the Last Year: Never true   Transportation Needs:     Lack of Transportation (Medical):      Lack of Transportation (Non-Medical):    Physical Activity:     Days of Exercise per Week:     Minutes of Exercise per Session:    Stress:     Feeling of Stress :    Social Connections:     Frequency of Communication with Friends and

## 2021-06-21 NOTE — ANESTHESIA PRE PROCEDURE
Department of Anesthesiology  Preprocedure Note       Name:  Travon Feliciano   Age:  50 y.o.  :  1975                                          MRN:  [id number]         Date:  2021      Surgeon: Ediwn Lewis):  Denise Farnsworth MD    Procedure: Procedure(s):  ROBOTIC LEFT INGUINAL HERNIA REPAIR WITH MESH, POSSIBLE RIGHT, POSSIBLE OPEN    Medications prior to admission:   Prior to Admission medications    Medication Sig Start Date End Date Taking? Authorizing Provider   Ascorbic Acid (VITAMIN C) 500 MG CAPS Take 1 capsule by mouth daily   Yes Historical Provider, MD   lisinopril (PRINIVIL;ZESTRIL) 20 MG tablet TAKE 1 TABLET BY MOUTH  DAILY 6/15/21   Amber Villasenor MD   atorvastatin (LIPITOR) 20 MG tablet TAKE 1 TABLET DAILY  Patient taking differently: Take 20 mg by mouth every evening TAKE 1 TABLET DAILY 21   Tammie Becca, APRN - CNP   Omega-3 Fatty Acids (FISH OIL PO) Take 2 tablets by mouth daily     Historical Provider, MD   Cholecalciferol (VITAMIN D PO) Take 2,000 Int'l Units by mouth daily     Historical Provider, MD       Current medications:    No current facility-administered medications for this encounter.      Current Outpatient Medications   Medication Sig Dispense Refill    Ascorbic Acid (VITAMIN C) 500 MG CAPS Take 1 capsule by mouth daily      lisinopril (PRINIVIL;ZESTRIL) 20 MG tablet TAKE 1 TABLET BY MOUTH  DAILY 90 tablet 3    atorvastatin (LIPITOR) 20 MG tablet TAKE 1 TABLET DAILY (Patient taking differently: Take 20 mg by mouth every evening TAKE 1 TABLET DAILY) 90 tablet 3    Omega-3 Fatty Acids (FISH OIL PO) Take 2 tablets by mouth daily       Cholecalciferol (VITAMIN D PO) Take 2,000 Int'l Units by mouth daily          Allergies:  No Known Allergies    Problem List:    Patient Active Problem List   Diagnosis Code    Hyperlipidemia, mixed E78.2    Hyperglycemia R73.9    GERD (gastroesophageal reflux disease) K21.9    Rhinitis, likely nonallergic (IgE neg 11/09) but sx seasonal J31.0    Vitamin D insufficiency E55.9    Lumbar spondylosis M47.816    Hypertension, essential I10    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 Randell Carlton History:    Social History     Tobacco Use    Smoking status: Never Smoker    Smokeless tobacco: Never Used   Substance Use Topics    Alcohol use: Yes     Comment: socially                                Counseling given: Not Answered      Vital Signs (Current):   Vitals:    06/21/21 0909   Weight: 142 lb (64.4 kg)   Height: 5' 6.5\" (1.689 m)                                              BP Readings from Last 3 Encounters:   06/21/21 122/64   06/10/21 136/75   06/04/21 132/68       NPO Status:                                                                                 BMI:   Wt Readings from Last 3 Encounters:   06/21/21 142 lb (64.4 kg)   06/10/21 142 lb (64.4 kg)   06/04/21 142 lb (64.4 kg)     Body mass index is 22.58 kg/m².     CBC:   Lab Results   Component Value Date    WBC 6.7 01/18/2019    RBC 4.97 01/18/2019    HGB 16.1 01/18/2019    HCT 46.1 01/18/2019    MCV 92.9 01/18/2019    RDW 12.3 01/18/2019     01/18/2019       CMP:   Lab Results   Component Value Date     07/01/2020    K 4.4 07/01/2020    CL 99 07/01/2020    CO2 27 07/01/2020    BUN 12 07/01/2020    CREATININE 0.8 07/01/2020    GFRAA >60 07/01/2020    GFRAA >60 05/21/2013    AGRATIO 0.8 07/01/2020    LABGLOM >60 07/01/2020    GLUCOSE 92 01/18/2019    PROT 9.3 07/01/2020    PROT 8.6 11/20/2012    CALCIUM 9.1 07/01/2020    BILITOT 0.8 07/01/2020    ALKPHOS 82 07/01/2020    AST 31 07/01/2020    ALT 33 07/01/2020       POC Tests: No results for input(s): POCGLU, POCNA, POCK, POCCL, POCBUN, POCHEMO, POCHCT in the last 72 hours. Coags: No results found for: PROTIME, INR, APTT    HCG (If Applicable): No results found for: PREGTESTUR, PREGSERUM, HCG, HCGQUANT     ABGs: No results found for: PHART, PO2ART, GCJ7NKD, NEX9SUI, BEART, R9XZDEWJ     Type & Screen (If Applicable):  No results found for: LABABO, LABRH    Drug/Infectious Status (If Applicable):  No results found for: HIV, HEPCAB    COVID-19 Screening (If Applicable): No results found for: COVID19        Anesthesia Evaluation  Patient summary reviewed and Nursing notes reviewed   history of anesthetic complications: PONV. Airway: Mallampati: IV     Neck ROM: full   Dental:          Pulmonary:Negative Pulmonary ROS and normal exam                               Cardiovascular:Negative CV ROS    (+) hypertension:, hyperlipidemia                  Neuro/Psych:   Negative Neuro/Psych ROS              GI/Hepatic/Renal: Neg GI/Hepatic/Renal ROS  (+) GERD: well controlled,      (-) hiatal hernia       Endo/Other: Negative Endo/Other ROS                    Abdominal:           Vascular:                                      Anesthesia Plan      general     ASA 3     (I discussed with the patient the risks and benefits of PIV, general anesthesia, IV Narcotics, PACU. All questions were answered the patient agrees with the plan and wishes to proceed.  )  Induction: intravenous. Pre-Operative Diagnosis: Inguinal hernia without obstruction or gangrene, recurrence not specified, unspecified laterality [K40.90]    50 y.o.   BMI:  Body mass index is 22.73 kg/m².      Vitals:    06/21/21 0909 06/23/21 0621   BP:  126/74 Pulse:  52   Resp:  16   Temp:  98.3 °F (36.8 °C)   TempSrc:  Temporal   SpO2:  100%   Weight: 142 lb (64.4 kg) 140 lb 12.8 oz (63.9 kg)   Height: 5' 6.5\" (1.689 m) 5' 6\" (1.676 m)       No Known Allergies    Social History     Tobacco Use    Smoking status: Never Smoker    Smokeless tobacco: Never Used   Substance Use Topics    Alcohol use: Yes     Comment: socially       LABS:    CBC  Lab Results   Component Value Date/Time    WBC 6.7 01/18/2019 07:37 AM    HGB 16.1 01/18/2019 07:37 AM    HCT 46.1 01/18/2019 07:37 AM     01/18/2019 07:37 AM     RENAL  Lab Results   Component Value Date/Time     (L) 07/01/2020 07:35 AM    K 4.4 07/01/2020 07:35 AM    CL 99 07/01/2020 07:35 AM    CO2 27 07/01/2020 07:35 AM    BUN 12 07/01/2020 07:35 AM    CREATININE 0.8 (L) 07/01/2020 07:35 AM    GLUCOSE 92 01/18/2019 07:37 AM     COAGS  No results found for: PROTIME, INR, APTT      Shelton Huff MD   6/21/2021

## 2021-06-21 NOTE — PROGRESS NOTES
[facility] Preoperative Screening for Elective Surgery/Invasive Procedures While COVID-19 present in the community     Have you had any of the following symptoms?none  o Fever, chills  o Cough  o Shortness of breath  o Muscle aches/pain  o Diarrhea  o Abdominal pain, nausea, vomiting  o Loss or decrease in taste and / or smell   Risk of Exposure  o Have you recently been hospitalized for COVID-19 or flu-like illness, if so when?no  o Recently diagnosed with COVID-19, if so when?no  o Recently tested for COVID-19, if so when?no  o Have you been in close contact with a person or family member who currently has or recently had COVID-19? If yes, when and in what context?no  o Do you live with anybody who in the last 14 days has had fever, chills, shortness of breath, muscle aches, flu-like illness?no  o Do you have any close contacts or family members who are currently in the hospital for COVID-19 or flu-like illness? If yes, assess recent close contact with this person. no    Indicate if the patient has a positive screen by answering yes to one or more of the above questions. Patients who test positive or screen positive prior to surgery or on the day of surgery should be evaluated in conjunction with the surgeon/proceduralist/anesthesiologist to determine the urgency of the procedure.    2nd covid vaccine 2/2021

## 2021-06-23 ENCOUNTER — HOSPITAL ENCOUNTER (OUTPATIENT)
Age: 46
Setting detail: OUTPATIENT SURGERY
Discharge: HOME | End: 2021-06-23
Attending: SURGERY | Admitting: SURGERY
Payer: COMMERCIAL

## 2021-06-23 ENCOUNTER — ANESTHESIA (OUTPATIENT)
Dept: OPERATING ROOM | Age: 46
End: 2021-06-23
Payer: COMMERCIAL

## 2021-06-23 VITALS
DIASTOLIC BLOOD PRESSURE: 71 MMHG | BODY MASS INDEX: 22.63 KG/M2 | HEIGHT: 66 IN | TEMPERATURE: 97.6 F | RESPIRATION RATE: 14 BRPM | WEIGHT: 140.8 LBS | HEART RATE: 57 BPM | OXYGEN SATURATION: 97 % | SYSTOLIC BLOOD PRESSURE: 111 MMHG

## 2021-06-23 VITALS
OXYGEN SATURATION: 100 % | SYSTOLIC BLOOD PRESSURE: 92 MMHG | DIASTOLIC BLOOD PRESSURE: 51 MMHG | RESPIRATION RATE: 1 BRPM

## 2021-06-23 DIAGNOSIS — G89.18 POSTOPERATIVE PAIN: Primary | ICD-10-CM

## 2021-06-23 LAB
ANION GAP SERPL CALCULATED.3IONS-SCNC: 4 MMOL/L (ref 3–16)
BUN BLDV-MCNC: 20 MG/DL (ref 7–20)
CALCIUM SERPL-MCNC: 8.8 MG/DL (ref 8.3–10.6)
CHLORIDE BLD-SCNC: 102 MMOL/L (ref 99–110)
CO2: 27 MMOL/L (ref 21–32)
CREAT SERPL-MCNC: 0.8 MG/DL (ref 0.9–1.3)
GFR AFRICAN AMERICAN: >60
GFR NON-AFRICAN AMERICAN: >60
GLUCOSE BLD-MCNC: 95 MG/DL (ref 70–99)
HCT VFR BLD CALC: 38.3 % (ref 40.5–52.5)
HEMOGLOBIN: 13.4 G/DL (ref 13.5–17.5)
MCH RBC QN AUTO: 33.7 PG (ref 26–34)
MCHC RBC AUTO-ENTMCNC: 34.9 G/DL (ref 31–36)
MCV RBC AUTO: 96.6 FL (ref 80–100)
PDW BLD-RTO: 12.7 % (ref 12.4–15.4)
PLATELET # BLD: 170 K/UL (ref 135–450)
PMV BLD AUTO: 8.6 FL (ref 5–10.5)
POTASSIUM REFLEX MAGNESIUM: 4.3 MMOL/L (ref 3.5–5.1)
RBC # BLD: 3.96 M/UL (ref 4.2–5.9)
SODIUM BLD-SCNC: 133 MMOL/L (ref 136–145)
WBC # BLD: 3.5 K/UL (ref 4–11)

## 2021-06-23 PROCEDURE — 6360000002 HC RX W HCPCS: Performed by: SURGERY

## 2021-06-23 PROCEDURE — 49650 LAP ING HERNIA REPAIR INIT: CPT | Performed by: SURGERY

## 2021-06-23 PROCEDURE — 2500000003 HC RX 250 WO HCPCS: Performed by: NURSE ANESTHETIST, CERTIFIED REGISTERED

## 2021-06-23 PROCEDURE — 6360000002 HC RX W HCPCS: Performed by: ANESTHESIOLOGY

## 2021-06-23 PROCEDURE — 3600000009 HC SURGERY ROBOT BASE: Performed by: SURGERY

## 2021-06-23 PROCEDURE — 85027 COMPLETE CBC AUTOMATED: CPT

## 2021-06-23 PROCEDURE — 3600000019 HC SURGERY ROBOT ADDTL 15MIN: Performed by: SURGERY

## 2021-06-23 PROCEDURE — 7100000011 HC PHASE II RECOVERY - ADDTL 15 MIN: Performed by: SURGERY

## 2021-06-23 PROCEDURE — C9290 INJ, BUPIVACAINE LIPOSOME: HCPCS | Performed by: SURGERY

## 2021-06-23 PROCEDURE — 7100000010 HC PHASE II RECOVERY - FIRST 15 MIN: Performed by: SURGERY

## 2021-06-23 PROCEDURE — 3700000000 HC ANESTHESIA ATTENDED CARE: Performed by: SURGERY

## 2021-06-23 PROCEDURE — 7100000001 HC PACU RECOVERY - ADDTL 15 MIN: Performed by: SURGERY

## 2021-06-23 PROCEDURE — 2709999900 HC NON-CHARGEABLE SUPPLY: Performed by: SURGERY

## 2021-06-23 PROCEDURE — 2580000003 HC RX 258: Performed by: ANESTHESIOLOGY

## 2021-06-23 PROCEDURE — 6360000002 HC RX W HCPCS: Performed by: NURSE ANESTHETIST, CERTIFIED REGISTERED

## 2021-06-23 PROCEDURE — 7100000000 HC PACU RECOVERY - FIRST 15 MIN: Performed by: SURGERY

## 2021-06-23 PROCEDURE — 6370000000 HC RX 637 (ALT 250 FOR IP): Performed by: ANESTHESIOLOGY

## 2021-06-23 PROCEDURE — 2580000003 HC RX 258: Performed by: SURGERY

## 2021-06-23 PROCEDURE — 80048 BASIC METABOLIC PNL TOTAL CA: CPT

## 2021-06-23 PROCEDURE — 2500000003 HC RX 250 WO HCPCS: Performed by: ANESTHESIOLOGY

## 2021-06-23 PROCEDURE — S2900 ROBOTIC SURGICAL SYSTEM: HCPCS | Performed by: SURGERY

## 2021-06-23 PROCEDURE — 3700000001 HC ADD 15 MINUTES (ANESTHESIA): Performed by: SURGERY

## 2021-06-23 PROCEDURE — C1781 MESH (IMPLANTABLE): HCPCS | Performed by: SURGERY

## 2021-06-23 DEVICE — 3DMAX MESH, 10.8 CM X 16.0 CM (4.3" X 6.3"), LEFT, LARGE
Type: IMPLANTABLE DEVICE | Site: INGUINAL | Status: FUNCTIONAL
Brand: 3DMAX

## 2021-06-23 RX ORDER — APREPITANT 40 MG/1
40 CAPSULE ORAL ONCE
Status: COMPLETED | OUTPATIENT
Start: 2021-06-23 | End: 2021-06-23

## 2021-06-23 RX ORDER — LABETALOL HYDROCHLORIDE 5 MG/ML
5 INJECTION, SOLUTION INTRAVENOUS EVERY 10 MIN PRN
Status: DISCONTINUED | OUTPATIENT
Start: 2021-06-23 | End: 2021-06-23 | Stop reason: HOSPADM

## 2021-06-23 RX ORDER — HYDRALAZINE HYDROCHLORIDE 20 MG/ML
5 INJECTION INTRAMUSCULAR; INTRAVENOUS EVERY 10 MIN PRN
Status: DISCONTINUED | OUTPATIENT
Start: 2021-06-23 | End: 2021-06-23 | Stop reason: HOSPADM

## 2021-06-23 RX ORDER — SODIUM CHLORIDE 9 MG/ML
25 INJECTION, SOLUTION INTRAVENOUS PRN
Status: DISCONTINUED | OUTPATIENT
Start: 2021-06-23 | End: 2021-06-23 | Stop reason: HOSPADM

## 2021-06-23 RX ORDER — MORPHINE SULFATE 2 MG/ML
1 INJECTION, SOLUTION INTRAMUSCULAR; INTRAVENOUS EVERY 5 MIN PRN
Status: DISCONTINUED | OUTPATIENT
Start: 2021-06-23 | End: 2021-06-23 | Stop reason: HOSPADM

## 2021-06-23 RX ORDER — KETOROLAC TROMETHAMINE 30 MG/ML
INJECTION, SOLUTION INTRAMUSCULAR; INTRAVENOUS PRN
Status: DISCONTINUED | OUTPATIENT
Start: 2021-06-23 | End: 2021-06-23 | Stop reason: SDUPTHER

## 2021-06-23 RX ORDER — MORPHINE SULFATE 2 MG/ML
2 INJECTION, SOLUTION INTRAMUSCULAR; INTRAVENOUS EVERY 5 MIN PRN
Status: DISCONTINUED | OUTPATIENT
Start: 2021-06-23 | End: 2021-06-23 | Stop reason: HOSPADM

## 2021-06-23 RX ORDER — SODIUM CHLORIDE 0.9 % (FLUSH) 0.9 %
10 SYRINGE (ML) INJECTION PRN
Status: DISCONTINUED | OUTPATIENT
Start: 2021-06-23 | End: 2021-06-23 | Stop reason: HOSPADM

## 2021-06-23 RX ORDER — ROCURONIUM BROMIDE 10 MG/ML
INJECTION, SOLUTION INTRAVENOUS PRN
Status: DISCONTINUED | OUTPATIENT
Start: 2021-06-23 | End: 2021-06-23 | Stop reason: SDUPTHER

## 2021-06-23 RX ORDER — OXYCODONE HYDROCHLORIDE AND ACETAMINOPHEN 5; 325 MG/1; MG/1
2 TABLET ORAL PRN
Status: COMPLETED | OUTPATIENT
Start: 2021-06-23 | End: 2021-06-23

## 2021-06-23 RX ORDER — LIDOCAINE HYDROCHLORIDE 10 MG/ML
INJECTION, SOLUTION INFILTRATION; PERINEURAL PRN
Status: DISCONTINUED | OUTPATIENT
Start: 2021-06-23 | End: 2021-06-23 | Stop reason: SDUPTHER

## 2021-06-23 RX ORDER — ONDANSETRON 2 MG/ML
4 INJECTION INTRAMUSCULAR; INTRAVENOUS PRN
Status: DISCONTINUED | OUTPATIENT
Start: 2021-06-23 | End: 2021-06-23 | Stop reason: HOSPADM

## 2021-06-23 RX ORDER — SODIUM CHLORIDE, SODIUM LACTATE, POTASSIUM CHLORIDE, CALCIUM CHLORIDE 600; 310; 30; 20 MG/100ML; MG/100ML; MG/100ML; MG/100ML
INJECTION, SOLUTION INTRAVENOUS CONTINUOUS
Status: DISCONTINUED | OUTPATIENT
Start: 2021-06-23 | End: 2021-06-23 | Stop reason: HOSPADM

## 2021-06-23 RX ORDER — DEXAMETHASONE SODIUM PHOSPHATE 10 MG/ML
INJECTION INTRAMUSCULAR; INTRAVENOUS PRN
Status: DISCONTINUED | OUTPATIENT
Start: 2021-06-23 | End: 2021-06-23 | Stop reason: SDUPTHER

## 2021-06-23 RX ORDER — OXYCODONE HYDROCHLORIDE 5 MG/1
5-10 TABLET ORAL EVERY 6 HOURS PRN
Qty: 16 TABLET | Refills: 0 | Status: SHIPPED | OUTPATIENT
Start: 2021-06-23 | End: 2021-06-28

## 2021-06-23 RX ORDER — DIPHENHYDRAMINE HYDROCHLORIDE 50 MG/ML
12.5 INJECTION INTRAMUSCULAR; INTRAVENOUS
Status: DISCONTINUED | OUTPATIENT
Start: 2021-06-23 | End: 2021-06-23 | Stop reason: HOSPADM

## 2021-06-23 RX ORDER — OXYCODONE HYDROCHLORIDE AND ACETAMINOPHEN 5; 325 MG/1; MG/1
1 TABLET ORAL PRN
Status: COMPLETED | OUTPATIENT
Start: 2021-06-23 | End: 2021-06-23

## 2021-06-23 RX ORDER — MEPERIDINE HYDROCHLORIDE 50 MG/ML
12.5 INJECTION INTRAMUSCULAR; INTRAVENOUS; SUBCUTANEOUS EVERY 5 MIN PRN
Status: DISCONTINUED | OUTPATIENT
Start: 2021-06-23 | End: 2021-06-23 | Stop reason: HOSPADM

## 2021-06-23 RX ORDER — SODIUM CHLORIDE 0.9 % (FLUSH) 0.9 %
10 SYRINGE (ML) INJECTION EVERY 12 HOURS SCHEDULED
Status: DISCONTINUED | OUTPATIENT
Start: 2021-06-23 | End: 2021-06-23 | Stop reason: HOSPADM

## 2021-06-23 RX ORDER — PROPOFOL 10 MG/ML
INJECTION, EMULSION INTRAVENOUS PRN
Status: DISCONTINUED | OUTPATIENT
Start: 2021-06-23 | End: 2021-06-23 | Stop reason: SDUPTHER

## 2021-06-23 RX ORDER — SCOLOPAMINE TRANSDERMAL SYSTEM 1 MG/1
1 PATCH, EXTENDED RELEASE TRANSDERMAL ONCE
Status: DISCONTINUED | OUTPATIENT
Start: 2021-06-23 | End: 2021-06-23 | Stop reason: HOSPADM

## 2021-06-23 RX ORDER — ONDANSETRON 2 MG/ML
INJECTION INTRAMUSCULAR; INTRAVENOUS PRN
Status: DISCONTINUED | OUTPATIENT
Start: 2021-06-23 | End: 2021-06-23 | Stop reason: SDUPTHER

## 2021-06-23 RX ORDER — MIDAZOLAM HYDROCHLORIDE 1 MG/ML
INJECTION INTRAMUSCULAR; INTRAVENOUS PRN
Status: DISCONTINUED | OUTPATIENT
Start: 2021-06-23 | End: 2021-06-23 | Stop reason: SDUPTHER

## 2021-06-23 RX ORDER — FENTANYL CITRATE 50 UG/ML
INJECTION, SOLUTION INTRAMUSCULAR; INTRAVENOUS PRN
Status: DISCONTINUED | OUTPATIENT
Start: 2021-06-23 | End: 2021-06-23 | Stop reason: SDUPTHER

## 2021-06-23 RX ORDER — PROMETHAZINE HYDROCHLORIDE 25 MG/ML
6.25 INJECTION, SOLUTION INTRAMUSCULAR; INTRAVENOUS
Status: DISCONTINUED | OUTPATIENT
Start: 2021-06-23 | End: 2021-06-23 | Stop reason: HOSPADM

## 2021-06-23 RX ADMIN — ONDANSETRON 4 MG: 2 INJECTION INTRAMUSCULAR; INTRAVENOUS at 07:46

## 2021-06-23 RX ADMIN — MIDAZOLAM HYDROCHLORIDE 2 MG: 2 INJECTION, SOLUTION INTRAMUSCULAR; INTRAVENOUS at 07:26

## 2021-06-23 RX ADMIN — SUGAMMADEX 200 MG: 100 INJECTION, SOLUTION INTRAVENOUS at 08:43

## 2021-06-23 RX ADMIN — FAMOTIDINE 20 MG: 10 INJECTION, SOLUTION INTRAVENOUS at 06:35

## 2021-06-23 RX ADMIN — OXYCODONE HYDROCHLORIDE AND ACETAMINOPHEN 1 TABLET: 5; 325 TABLET ORAL at 10:59

## 2021-06-23 RX ADMIN — LIDOCAINE HYDROCHLORIDE 40 MG: 10 INJECTION, SOLUTION INFILTRATION; PERINEURAL at 07:35

## 2021-06-23 RX ADMIN — SODIUM CHLORIDE, SODIUM LACTATE, POTASSIUM CHLORIDE, AND CALCIUM CHLORIDE: .6; .31; .03; .02 INJECTION, SOLUTION INTRAVENOUS at 07:23

## 2021-06-23 RX ADMIN — FENTANYL CITRATE 50 MCG: 50 INJECTION INTRAMUSCULAR; INTRAVENOUS at 07:52

## 2021-06-23 RX ADMIN — CEFAZOLIN 2000 MG: 10 INJECTION, POWDER, FOR SOLUTION INTRAVENOUS at 07:41

## 2021-06-23 RX ADMIN — KETOROLAC TROMETHAMINE 30 MG: 30 INJECTION, SOLUTION INTRAMUSCULAR; INTRAVENOUS at 08:43

## 2021-06-23 RX ADMIN — SODIUM CHLORIDE, SODIUM LACTATE, POTASSIUM CHLORIDE, AND CALCIUM CHLORIDE: .6; .31; .03; .02 INJECTION, SOLUTION INTRAVENOUS at 08:28

## 2021-06-23 RX ADMIN — APREPITANT 40 MG: 40 CAPSULE ORAL at 06:34

## 2021-06-23 RX ADMIN — DEXAMETHASONE SODIUM PHOSPHATE 8 MG: 10 INJECTION INTRAMUSCULAR; INTRAVENOUS at 07:46

## 2021-06-23 RX ADMIN — ROCURONIUM BROMIDE 50 MG: 10 SOLUTION INTRAVENOUS at 07:35

## 2021-06-23 RX ADMIN — PROPOFOL 160 MG: 10 INJECTION, EMULSION INTRAVENOUS at 07:35

## 2021-06-23 RX ADMIN — FENTANYL CITRATE 50 MCG: 50 INJECTION INTRAMUSCULAR; INTRAVENOUS at 07:35

## 2021-06-23 ASSESSMENT — PULMONARY FUNCTION TESTS
PIF_VALUE: 13
PIF_VALUE: 21
PIF_VALUE: 2
PIF_VALUE: 13
PIF_VALUE: 19
PIF_VALUE: 11
PIF_VALUE: 19
PIF_VALUE: 1
PIF_VALUE: 4
PIF_VALUE: 19
PIF_VALUE: 12
PIF_VALUE: 19
PIF_VALUE: 21
PIF_VALUE: 21
PIF_VALUE: 20
PIF_VALUE: 19
PIF_VALUE: 22
PIF_VALUE: 21
PIF_VALUE: 1
PIF_VALUE: 22
PIF_VALUE: 19
PIF_VALUE: 0
PIF_VALUE: 12
PIF_VALUE: 1
PIF_VALUE: 12
PIF_VALUE: 16
PIF_VALUE: 4
PIF_VALUE: 13
PIF_VALUE: 5
PIF_VALUE: 22
PIF_VALUE: 13
PIF_VALUE: 13
PIF_VALUE: 12
PIF_VALUE: 13
PIF_VALUE: 17
PIF_VALUE: 14
PIF_VALUE: 20
PIF_VALUE: 19
PIF_VALUE: 0
PIF_VALUE: 19
PIF_VALUE: 12
PIF_VALUE: 21
PIF_VALUE: 0
PIF_VALUE: 3
PIF_VALUE: 20
PIF_VALUE: 17
PIF_VALUE: 12
PIF_VALUE: 20
PIF_VALUE: 21
PIF_VALUE: 12
PIF_VALUE: 20
PIF_VALUE: 10
PIF_VALUE: 22
PIF_VALUE: 13
PIF_VALUE: 10
PIF_VALUE: 12
PIF_VALUE: 19
PIF_VALUE: 14
PIF_VALUE: 11
PIF_VALUE: 13
PIF_VALUE: 76
PIF_VALUE: 13
PIF_VALUE: 9
PIF_VALUE: 12
PIF_VALUE: 19
PIF_VALUE: 20
PIF_VALUE: 13
PIF_VALUE: 3
PIF_VALUE: 12
PIF_VALUE: 19
PIF_VALUE: 8
PIF_VALUE: 16
PIF_VALUE: 13
PIF_VALUE: 21
PIF_VALUE: 20
PIF_VALUE: 0
PIF_VALUE: 18
PIF_VALUE: 12
PIF_VALUE: 14
PIF_VALUE: 14
PIF_VALUE: 21
PIF_VALUE: 22
PIF_VALUE: 21
PIF_VALUE: 18
PIF_VALUE: 12
PIF_VALUE: 17
PIF_VALUE: 12

## 2021-06-23 ASSESSMENT — PAIN SCALES - GENERAL: PAINLEVEL_OUTOF10: 5

## 2021-06-23 ASSESSMENT — PAIN - FUNCTIONAL ASSESSMENT: PAIN_FUNCTIONAL_ASSESSMENT: 0-10

## 2021-06-23 NOTE — OP NOTE
Date of Surgery:  6/23/21    Preop Dx: Left Inguinal Hernia    Postop Dx:   Same    Procedure:   Robotic assisted laparoscopic left inguinal hernia repair with mesh (ANDRE)    Surgeon:   Jan Wagoner    Assistant:       Anesthesia:   GETA    EBL:    <50ml    Specimen:   none    Complications:  none    Drains/Lines:   none    Indications:   51 yo with left inguinal hernia with increasing pain    Description:   Patient was given adequate description of the risks and rewards of the procedure, including bleeding, infection, recurrence and freely consented. He was given appropriate antibiotics and brought to the OR where GETA anesthesia was induced. He was placed in lithotomy position. Prepped and draped in usual sterile fashion. Initial incision made above umbilicus and using optiview technique a 5mm trocar was inserted. This was later upsized to 12mm. Abdomen insufflated and laparoscope inserted. Under direct visualization an 8 mm trocar was inserted in the left and right abdomen. Patient then placed in slight trendelenberg position and robot docked. No hernia was seen on the opposite side. Direct inguinal hernia was evident on the left side. The peritoneum was scored with electrocautery superior to the hernia defect and incised along an adequate length. The peritoneal flap was mobilized inferior using blunt dissection and electrocautery. The inferior epigastric vessels were exposed and pubic symphysis identified. Alfred's ligament was identified during dissection as well. The cord structures were skeletonized and no indirect defect seen. A direct defect was seen and reduced. A large piece of mesh was placed intraabdominal and positioned to completely cover the direct, indirect and femoral spaces. It was secured to alfred's and superiorly with 2-0 vicryl sutures. The peritoneal flap was then closed with running strattafix suture. The robot was undocked and trocars removed.  The 12mm trocar site was closed at fascial level with 0-0 vicryl suture. All trocar sites had epidermis closed with 4-0 monocryl. Sterile dressing placed. All suture, sponge and instrument count correct times two at end of case. Transferred to PACU in stable condition.     Roby Walker MD

## 2021-06-23 NOTE — H&P
I have reviewed the history and physical and examined the patient. I find no relevant changes. I have reviewed with the patient and/or family members, during the preoperative office visit the risks, benefits, and alternatives to the procedure.     Michael Dumont MD

## 2021-06-23 NOTE — ANESTHESIA POSTPROCEDURE EVALUATION
Department of Anesthesiology  Postprocedure Note    Patient: Miky Meza  MRN: [id number]  YOB: 1975  Date of evaluation: 6/23/2021  Time:  2:51 PM     Procedure Summary     Date: 06/23/21 Room / Location: [address] [place] [address]    Anesthesia Start: 7130 Anesthesia Stop: 1323    Procedure: ROBOTIC LEFT INGUINAL HERNIA REPAIR WITH MESH (N/A Abdomen) Diagnosis:       Inguinal hernia without obstruction or gangrene, recurrence not specified, unspecified laterality      (INGUINAL HERNIA   K40.90)    Surgeons: Clint Muñiz MD Responsible Provider: Yumiko Li MD    Anesthesia Type: general ASA Status: 3          Anesthesia Type: general    Garland Phase I: Garland Score: 9    Garland Phase II: Garland Score: 10    Last vitals: Reviewed and per EMR flowsheets.        Anesthesia Post Evaluation    Comments: Postoperative Anesthesia Note    Name:    Miky Meza  MRN:      [id number]    Patient Vitals in the past 12 hrs:  06/23/21 0920, BP:111/71, Pulse:57, Resp:14, SpO2:97 %  06/23/21 0910, BP:108/69, Temp:97.6 °F (36.4 °C), Temp src:Temporal, Pulse:78, Resp:11, SpO2:(!) 88 %  06/23/21 0621, BP:126/74, Temp:98.3 °F (36.8 °C), Temp src:Temporal, Pulse:52, Resp:16, SpO2:100 %, Height:5' 6\" (1.676 m), Weight:140 lb 12.8 oz (63.9 kg)     LABS:    CBC  Lab Results       Component                Value               Date/Time                  WBC                      3.5 (L)             06/23/2021 06:59 AM        HGB                      13.4 (L)            06/23/2021 06:59 AM        HCT                      38.3 (L)            06/23/2021 06:59 AM        PLT                      170                 06/23/2021 06:59 AM   RENAL  Lab Results       Component                Value               Date/Time                  NA                       133 (L)             06/23/2021 06:30 AM        K                        4.3                 06/23/2021 06:30 AM        CL

## 2021-06-24 ENCOUNTER — TELEPHONE (OUTPATIENT)
Dept: SURGERY | Age: 46
End: 2021-06-24

## 2021-06-24 ENCOUNTER — TELEPHONE (OUTPATIENT)
Dept: INTERNAL MEDICINE CLINIC | Age: 46
End: 2021-06-24

## 2021-06-24 NOTE — TELEPHONE ENCOUNTER
[facility] Initial Follow Up Call    Outreach made within 2 business days of discharge: Yes    Patient: Lesleigh Boast Patient : 1975   MRN: <[id number]>  Reason for Admission: There are no discharge diagnoses documented for the most recent discharge. Discharge Date: 21       Spoke with: patient contacted surgeons office with questions and concerns this morning.   Post op     Discharge department/facility: [facility]    Scheduled appointment with PCP within 7-14 days    Follow Up  Future Appointments   Date Time Provider Vita Morgan   2021 11:00 AM Lore Lamb MD AND  & [facility]   2022 10:00 AM Zaynab Fine MD [address] [place] [address]

## 2021-06-24 NOTE — TELEPHONE ENCOUNTER
Spoke with the patient regarding his left leg \"numbness\". He can walk with no problem, no pain in the area or swelling. This weird feeling is on the inner part of the thigh, starts at the knee and stops just senior living up his thigh. Will discuss with Dr. José Manuel Truong.

## 2021-06-24 NOTE — TELEPHONE ENCOUNTER
Sandra Nuñez had a Robotic [facility] repair yesterday and his now experiencing numbness on his left thigh. He would like to know if this is normal after surgery.

## 2021-06-24 NOTE — TELEPHONE ENCOUNTER
Per Dr. Iggy Yee- this can happen after surgery, can take Ibuprofen and use ice. The patient has been notified of this information and all questions answered.

## 2021-07-08 ENCOUNTER — OFFICE VISIT (OUTPATIENT)
Dept: SURGERY | Age: 46
End: 2021-07-08

## 2021-07-08 VITALS
BODY MASS INDEX: 22.5 KG/M2 | HEART RATE: 53 BPM | HEIGHT: 66 IN | WEIGHT: 140 LBS | DIASTOLIC BLOOD PRESSURE: 60 MMHG | SYSTOLIC BLOOD PRESSURE: 125 MMHG

## 2021-07-08 DIAGNOSIS — Z09 POSTOP CHECK: Primary | ICD-10-CM

## 2021-07-08 PROCEDURE — 99024 POSTOP FOLLOW-UP VISIT: CPT | Performed by: SURGERY

## 2021-07-09 NOTE — PROGRESS NOTES
HPI: Nursing notes reviewed. Patient is a 51 yo who underwent robotic LIH repair at [facility] [employer].   Reports minimal pain and no nausea. Energy is good. No fevers. ROS:  10 point review of systems performed with pertinent positives in HPI    Phys:    Abd - soft, minimal tender, nondistended, no hernias   Incisions - no erythema    Assesment: 51 yo s/p robotic LIH repair    Plan: 1. Doing well postop with minimal pain and no nausea   2. No heavy lifting another two weeks.    3.  Call with concerns

## 2021-07-25 ENCOUNTER — PATIENT MESSAGE (OUTPATIENT)
Dept: INTERNAL MEDICINE CLINIC | Age: 46
End: 2021-07-25

## 2021-07-26 NOTE — TELEPHONE ENCOUNTER
From: Dino Cardoza  To: BRINDA Adler - CNP  Sent: 7/25/2021 9:03 AM EDT  Subject: Prescription Question    Akash,    I am in need of refills on my Lisinopril 20 mg QD.  Refills need to be sent to [facility] - [phone]    Thanks,    Radha Valencia

## 2022-02-07 ENCOUNTER — TELEPHONE (OUTPATIENT)
Dept: INTERNAL MEDICINE CLINIC | Age: 47
End: 2022-02-07

## 2022-02-07 DIAGNOSIS — Z00.00 WELL ADULT EXAM: Primary | ICD-10-CM

## 2022-02-07 NOTE — TELEPHONE ENCOUNTER
Dr. Javan Brown is gone for the day and patient would like to have his labs drawn tomorrow Topical Sulfur Applications Pregnancy And Lactation Text: This medication is Pregnancy Category C and has an unknown safety profile during pregnancy. It is unknown if this topical medication is excreted in breast milk.

## 2022-02-07 NOTE — TELEPHONE ENCOUNTER
Patient has an appointment for next week and would like to have his labs done tomorrow.      Pended labs    Cbc  Bmp  Lipid

## 2022-02-08 ENCOUNTER — HOSPITAL ENCOUNTER (OUTPATIENT)
Age: 47
Discharge: HOME | End: 2022-02-08
Payer: COMMERCIAL

## 2022-02-08 DIAGNOSIS — Z00.00 WELL ADULT EXAM: ICD-10-CM

## 2022-02-08 LAB
A/G RATIO: 0.3 (ref 1.1–2.2)
ALBUMIN SERPL-MCNC: 3.6 G/DL (ref 3.4–5)
ALP BLD-CCNC: 66 U/L (ref 40–129)
ALT SERPL-CCNC: 23 U/L (ref 10–40)
ANION GAP SERPL CALCULATED.3IONS-SCNC: 8 MMOL/L (ref 3–16)
AST SERPL-CCNC: 23 U/L (ref 15–37)
BASOPHILS ABSOLUTE: 0 K/UL (ref 0–0.2)
BASOPHILS RELATIVE PERCENT: 0.6 %
BILIRUB SERPL-MCNC: 1.7 MG/DL (ref 0–1)
BUN BLDV-MCNC: 16 MG/DL (ref 7–20)
CALCIUM SERPL-MCNC: 8.8 MG/DL (ref 8.3–10.6)
CHLORIDE BLD-SCNC: 98 MMOL/L (ref 99–110)
CHOLESTEROL, TOTAL: 91 MG/DL (ref 0–199)
CO2: 23 MMOL/L (ref 21–32)
CREAT SERPL-MCNC: 0.8 MG/DL (ref 0.9–1.3)
EOSINOPHILS ABSOLUTE: 0.1 K/UL (ref 0–0.6)
EOSINOPHILS RELATIVE PERCENT: 2.6 %
GFR AFRICAN AMERICAN: >60
GFR NON-AFRICAN AMERICAN: >60
GLUCOSE BLD-MCNC: 81 MG/DL (ref 70–99)
HCT VFR BLD CALC: 35.4 % (ref 40.5–52.5)
HDLC SERPL-MCNC: 35 MG/DL (ref 40–60)
HEMOGLOBIN: 12.4 G/DL (ref 13.5–17.5)
LDL CHOLESTEROL CALCULATED: 44 MG/DL
LYMPHOCYTES ABSOLUTE: 0.9 K/UL (ref 1–5.1)
LYMPHOCYTES RELATIVE PERCENT: 22.7 %
MCH RBC QN AUTO: 34.3 PG (ref 26–34)
MCHC RBC AUTO-ENTMCNC: 35.1 G/DL (ref 31–36)
MCV RBC AUTO: 97.9 FL (ref 80–100)
MONOCYTES ABSOLUTE: 0.3 K/UL (ref 0–1.3)
MONOCYTES RELATIVE PERCENT: 8.4 %
NEUTROPHILS ABSOLUTE: 2.6 K/UL (ref 1.7–7.7)
NEUTROPHILS RELATIVE PERCENT: 65.7 %
PDW BLD-RTO: 13.5 % (ref 12.4–15.4)
PLATELET # BLD: 189 K/UL (ref 135–450)
PMV BLD AUTO: 8.4 FL (ref 5–10.5)
POTASSIUM SERPL-SCNC: 4.7 MMOL/L (ref 3.5–5.1)
PROSTATE SPECIFIC ANTIGEN: 0.26 NG/ML (ref 0–4)
RBC # BLD: 3.61 M/UL (ref 4.2–5.9)
SODIUM BLD-SCNC: 129 MMOL/L (ref 136–145)
TOTAL PROTEIN: 14.2 G/DL (ref 6.4–8.2)
TRIGL SERPL-MCNC: 61 MG/DL (ref 0–150)
VLDLC SERPL CALC-MCNC: 12 MG/DL
WBC # BLD: 3.9 K/UL (ref 4–11)

## 2022-02-08 PROCEDURE — 36415 COLL VENOUS BLD VENIPUNCTURE: CPT

## 2022-02-08 PROCEDURE — 80061 LIPID PANEL: CPT

## 2022-02-08 PROCEDURE — 84153 ASSAY OF PSA TOTAL: CPT

## 2022-02-08 PROCEDURE — 80053 COMPREHEN METABOLIC PANEL: CPT

## 2022-02-08 PROCEDURE — 85025 COMPLETE CBC W/AUTO DIFF WBC: CPT

## 2022-02-09 DIAGNOSIS — E87.1 HYPONATREMIA: ICD-10-CM

## 2022-02-09 DIAGNOSIS — D64.9 ANEMIA, UNSPECIFIED TYPE: ICD-10-CM

## 2022-02-09 DIAGNOSIS — R77.9 ELEVATED BLOOD PROTEIN: ICD-10-CM

## 2022-02-09 DIAGNOSIS — R17 ELEVATED BILIRUBIN: ICD-10-CM

## 2022-02-09 DIAGNOSIS — D64.9 ANEMIA, UNSPECIFIED TYPE: Primary | ICD-10-CM

## 2022-02-09 LAB
A/G RATIO: 0.3 (ref 1.1–2.2)
ALBUMIN SERPL-MCNC: 3.6 G/DL (ref 3.4–5)
ALP BLD-CCNC: 63 U/L (ref 40–129)
ALT SERPL-CCNC: 24 U/L (ref 10–40)
ANION GAP SERPL CALCULATED.3IONS-SCNC: 9 MMOL/L (ref 3–16)
AST SERPL-CCNC: 25 U/L (ref 15–37)
BILIRUB SERPL-MCNC: 1.3 MG/DL (ref 0–1)
BUN BLDV-MCNC: 16 MG/DL (ref 7–20)
CALCIUM SERPL-MCNC: 8.8 MG/DL (ref 8.3–10.6)
CHLORIDE BLD-SCNC: 95 MMOL/L (ref 99–110)
CO2: 22 MMOL/L (ref 21–32)
CREAT SERPL-MCNC: 0.8 MG/DL (ref 0.9–1.3)
FERRITIN: 131.8 NG/ML (ref 30–400)
GFR AFRICAN AMERICAN: >60
GFR NON-AFRICAN AMERICAN: >60
GLUCOSE BLD-MCNC: 74 MG/DL (ref 70–99)
IRON: 106 UG/DL (ref 59–158)
POTASSIUM SERPL-SCNC: 4.7 MMOL/L (ref 3.5–5.1)
SODIUM BLD-SCNC: 126 MMOL/L (ref 136–145)
TOTAL IRON BINDING CAPACITY: 239 UG/DL (ref 260–445)
TOTAL PROTEIN: 14.1 G/DL (ref 6.4–8.2)

## 2022-02-09 PROCEDURE — 83550 IRON BINDING TEST: CPT

## 2022-02-09 PROCEDURE — 80053 COMPREHEN METABOLIC PANEL: CPT

## 2022-02-09 PROCEDURE — 83540 ASSAY OF IRON: CPT

## 2022-02-09 PROCEDURE — 36415 COLL VENOUS BLD VENIPUNCTURE: CPT

## 2022-02-09 PROCEDURE — 82728 ASSAY OF FERRITIN: CPT

## 2022-02-10 ENCOUNTER — HOSPITAL ENCOUNTER (OUTPATIENT)
Age: 47
Discharge: HOME | End: 2022-02-10
Payer: COMMERCIAL

## 2022-02-10 DIAGNOSIS — E87.1 HYPONATREMIA: Primary | ICD-10-CM

## 2022-02-10 DIAGNOSIS — R77.9 ELEVATED BLOOD PROTEIN: ICD-10-CM

## 2022-02-10 DIAGNOSIS — R17 ELEVATED BILIRUBIN: ICD-10-CM

## 2022-02-10 LAB
A/G RATIO: 0.4 (ref 1.1–2.2)
ALBUMIN SERPL-MCNC: 3.6 G/DL (ref 3.4–5)
ALP BLD-CCNC: 63 U/L (ref 40–129)
ALT SERPL-CCNC: 23 U/L (ref 10–40)
ANION GAP SERPL CALCULATED.3IONS-SCNC: 9 MMOL/L (ref 3–16)
AST SERPL-CCNC: 25 U/L (ref 15–37)
BILIRUB SERPL-MCNC: 1.9 MG/DL (ref 0–1)
BUN BLDV-MCNC: 13 MG/DL (ref 7–20)
CALCIUM SERPL-MCNC: 8.7 MG/DL (ref 8.3–10.6)
CHLORIDE BLD-SCNC: 98 MMOL/L (ref 99–110)
CO2: 24 MMOL/L (ref 21–32)
CREAT SERPL-MCNC: 0.7 MG/DL (ref 0.9–1.3)
FERRITIN: 132.2 NG/ML (ref 30–400)
FOLATE: >20 NG/ML (ref 4.78–24.2)
GFR AFRICAN AMERICAN: >60
GFR NON-AFRICAN AMERICAN: >60
GLUCOSE BLD-MCNC: 89 MG/DL (ref 70–99)
IRON SATURATION: 47 % (ref 20–50)
IRON: 105 UG/DL (ref 59–158)
POTASSIUM SERPL-SCNC: 4.7 MMOL/L (ref 3.5–5.1)
SODIUM BLD-SCNC: 131 MMOL/L (ref 136–145)
TOTAL IRON BINDING CAPACITY: 223 UG/DL (ref 260–445)
TOTAL PROTEIN: 13.6 G/DL (ref 6.4–8.2)
VITAMIN B-12: 1591 PG/ML (ref 211–911)

## 2022-02-10 PROCEDURE — 82607 VITAMIN B-12: CPT

## 2022-02-10 PROCEDURE — 36415 COLL VENOUS BLD VENIPUNCTURE: CPT

## 2022-02-10 PROCEDURE — 82746 ASSAY OF FOLIC ACID SERUM: CPT

## 2022-02-10 PROCEDURE — 83540 ASSAY OF IRON: CPT

## 2022-02-10 PROCEDURE — 82728 ASSAY OF FERRITIN: CPT

## 2022-02-10 PROCEDURE — 83550 IRON BINDING TEST: CPT

## 2022-02-10 PROCEDURE — 80053 COMPREHEN METABOLIC PANEL: CPT

## 2022-02-14 ENCOUNTER — HOSPITAL ENCOUNTER (OUTPATIENT)
Age: 47
Discharge: HOME | End: 2022-02-14
Payer: COMMERCIAL

## 2022-02-14 ENCOUNTER — OFFICE VISIT (OUTPATIENT)
Dept: INTERNAL MEDICINE CLINIC | Age: 47
End: 2022-02-14
Payer: COMMERCIAL

## 2022-02-14 VITALS
OXYGEN SATURATION: 98 % | SYSTOLIC BLOOD PRESSURE: 134 MMHG | BODY MASS INDEX: 22.76 KG/M2 | WEIGHT: 141 LBS | TEMPERATURE: 97.1 F | HEART RATE: 72 BPM | DIASTOLIC BLOOD PRESSURE: 68 MMHG

## 2022-02-14 DIAGNOSIS — E87.1 HYPONATREMIA: Primary | ICD-10-CM

## 2022-02-14 DIAGNOSIS — E80.6 HYPERBILIRUBINEMIA: ICD-10-CM

## 2022-02-14 DIAGNOSIS — E88.09 HYPERPROTEINEMIA: ICD-10-CM

## 2022-02-14 DIAGNOSIS — I10 HYPERTENSION, ESSENTIAL: ICD-10-CM

## 2022-02-14 DIAGNOSIS — E87.1 HYPONATREMIA: ICD-10-CM

## 2022-02-14 DIAGNOSIS — D64.9 ANEMIA, UNSPECIFIED TYPE: ICD-10-CM

## 2022-02-14 DIAGNOSIS — R10.13 EPIGASTRIC DISCOMFORT: ICD-10-CM

## 2022-02-14 DIAGNOSIS — E78.2 HYPERLIPIDEMIA, MIXED: ICD-10-CM

## 2022-02-14 LAB
OSMOLALITY URINE: 228 MOSM/KG (ref 390–1070)
POTASSIUM, UR: 17.2 MMOL/L
SODIUM URINE: 41 MMOL/L

## 2022-02-14 PROCEDURE — 84300 ASSAY OF URINE SODIUM: CPT

## 2022-02-14 PROCEDURE — 84133 ASSAY OF URINE POTASSIUM: CPT

## 2022-02-14 PROCEDURE — 99215 OFFICE O/P EST HI 40 MIN: CPT | Performed by: FAMILY MEDICINE

## 2022-02-14 PROCEDURE — 83935 ASSAY OF URINE OSMOLALITY: CPT

## 2022-02-14 RX ORDER — ATORVASTATIN CALCIUM 20 MG/1
20 TABLET, FILM COATED ORAL EVERY EVENING
Qty: 90 TABLET | Refills: 0 | OUTPATIENT
Start: 2022-02-14 | End: 2022-08-29

## 2022-02-14 RX ORDER — LISINOPRIL 20 MG/1
20 TABLET ORAL DAILY
Qty: 90 TABLET | Refills: 3 | OUTPATIENT
Start: 2022-02-14 | End: 2022-09-04

## 2022-02-14 ASSESSMENT — PATIENT HEALTH QUESTIONNAIRE - PHQ9
SUM OF ALL RESPONSES TO PHQ QUESTIONS 1-9: 0
1. LITTLE INTEREST OR PLEASURE IN DOING THINGS: 0
SUM OF ALL RESPONSES TO PHQ QUESTIONS 1-9: 0
SUM OF ALL RESPONSES TO PHQ QUESTIONS 1-9: 0
SUM OF ALL RESPONSES TO PHQ9 QUESTIONS 1 & 2: 0
2. FEELING DOWN, DEPRESSED OR HOPELESS: 0
SUM OF ALL RESPONSES TO PHQ QUESTIONS 1-9: 0

## 2022-02-14 NOTE — PATIENT INSTRUCTIONS
Get labs and urine tests done downstairs today. Schedule CT scan - [phone]  Continue current medicines. Return in about 6 months (around 8/14/2022) for Fasting Physical + recheck BP, lipids. Patient Education        Hyponatremia: Care Instructions  Your Care Instructions     Hyponatremia (say \"xv-lr-avi-TREE-belia-uh\") means that you don't have enough sodium in your blood. It can cause nausea, vomiting, and headaches. Or you may not feel hungry. In serious cases, it can cause seizures, a coma, or even death. Hyponatremia is not a disease. It is a problem caused by something else, such as medicines or exercising for a long time in hot weather. You can get hyponatremia if you lose a lot of fluids and then you drink a lot of water or other liquids that don't have much sodium. You can also get it if you have kidney, liver, heart, or other health problems. Treatment is focused on getting your sodium levels back to normal.  Follow-up care is a key part of your treatment and safety. Be sure to make and go to all appointments, and call your doctor if you are having problems. It's also a good idea to know your test results and keep a list of the medicines you take. How can you care for yourself at home? · If your doctor recommends it, drink fluids that have sodium. Sports drinks are a good choice. Or you can eat salty foods. · If your doctor recommends it, limit the amount of water you drink. And limit fluids that are mostly water. These include tea, coffee, and juice. · Take your medicines exactly as prescribed. Call your doctor if you have any problems with your medicine. · Get your sodium levels tested when your doctor tells you to. When should you call for help? Call 911 anytime you think you may need emergency care. For example, call if:    · You have a seizure.     · You passed out (lost consciousness).    Call your doctor now or seek immediate medical care if:    · You are confused or it is hard to

## 2022-02-14 NOTE — PROGRESS NOTES
Subjective:      Patient ID: Devan Melgar is a 48 y.o.male who is being seen for   Chief Complaint   Patient presents with    Hypertension    Colon Cancer Screening     aware        HPI  Hypertension:  Home blood pressure monitoring: No.  He is not strictly adherent to a low sodium diet. Patient denies chest pain, shortness of breath, headache, lightheadedness, blurred vision, peripheral edema, palpitations and dry cough. Antihypertensive medication side effects: no medication side effects noted. Use of agents associated with hypertension: none. Sodium (mmol/L)   Date Value   02/10/2022 131 (L)    BUN (mg/dL)   Date Value   02/10/2022 13    Glucose (mg/dL)   Date Value   02/10/2022 89      Potassium (mmol/L)   Date Value   02/10/2022 4.7     Potassium reflex Magnesium (mmol/L)   Date Value   06/23/2021 4.3    CREATININE (mg/dL)   Date Value   02/10/2022 0.7 (L)         Hyperlipidemia:  No new myalgias or GI upset on atorvastatin (Lipitor). Medication compliance: compliant most of the time. Patient is  following a low fat, low cholesterol diet. He is  exercising regularly. Lab Results   Component Value Date    TRIG 61 02/08/2022    HDL 35 02/08/2022    HDL 43 05/22/2012    LDLCALC 44 02/08/2022     Lab Results   Component Value Date    ALT 23 02/10/2022    AST 25 02/10/2022          Pt noted to have numerous abnormalities on recent labs which persisted on repeat - hyponatremia, hyperproteinemia, hyperbilirubinemia, anemia. Denies HA, confusion, N/V, itching, yellow skin, etc. Pt has had some \"gnawing\" discomfort in upper abdomen which he was attributing to drinking too much coffee. No cough, SOB, other pulmonary sx. No FH lung cancer, pancreatic cancer, liver cancer. No known cholelithiasis. No diuretics or SSRI's but he is on lisinopril.       Patient's medications, past medical, surgical, social, and family historieswere reviewed by me personally and updated as appropriate. Outpatient Medications Marked as Taking for the 2/14/22 encounter (Office Visit) with Diana Fraire MD   Medication Sig Dispense Refill    atorvastatin (LIPITOR) 20 MG tablet Take 1 tablet by mouth every evening TAKE 1 TABLET DAILY 90 tablet 0    lisinopril (PRINIVIL;ZESTRIL) 20 MG tablet Take 1 tablet by mouth daily 90 tablet 3    Ascorbic Acid (VITAMIN C) 500 MG CAPS Take 1 capsule by mouth daily      Cholecalciferol (VITAMIN D PO) Take 2,000 Int'l Units by mouth daily          Review of Systems  Review of Systems   Constitutional: Negative for fatigue, fever and unexpected weight change. HENT: Negative for congestion, ear pain, hearing loss, postnasal drip, rhinorrhea, sinus pressure, sore throat and trouble swallowing. Eyes: Negative for pain, discharge, redness and visual disturbance. Respiratory: Negative for cough, chest tightness, shortness of breath and wheezing. Cardiovascular: Negative for chest pain, palpitations and leg swelling. Gastrointestinal: Positive for abdominal pain (\"gnawing\" discomfort in upper abdomen). Negative for constipation, diarrhea, nausea and vomiting. Endocrine: Negative for cold intolerance, heat intolerance, polydipsia, polyphagia and polyuria. Genitourinary: Negative for dysuria, flank pain, hematuria and testicular pain. Musculoskeletal: Negative for arthralgias, back pain, joint swelling and myalgias. Skin: Negative for color change and rash. Allergic/Immunologic: Negative for environmental allergies, food allergies and immunocompromised state. Neurological: Negative for dizziness, seizures, syncope, numbness and headaches. Hematological: Negative for adenopathy. Psychiatric/Behavioral: Negative for agitation, confusion, dysphoric mood and sleep disturbance. The patient is not nervous/anxious.         Objective:   Physical Exam    Wt Readings from Last 3 Encounters:   02/14/22 141 lb (64 kg)   07/08/21 140 lb (63.5 kg)   06/23/21 140 lb 12.8 oz (63.9 kg)       BP Readings from Last 3 Encounters:   02/14/22 134/68   07/08/21 125/60   06/23/21 (!) 92/51       Vitals:    02/14/22 1048   BP: 134/68   Pulse: 72   Temp: 97.1 °F (36.2 °C)   SpO2: 98%       Physical Exam  Nursing note and vitals reviewed. Vitals:    02/14/22 1048   BP: 134/68   Site: Right Upper Arm   Position: Sitting   Cuff Size: Medium Adult   Pulse: 72   Temp: 97.1 °F (36.2 °C)   TempSrc: Temporal   SpO2: 98%   Weight: 141 lb (64 kg)     Wt Readings from Last 3 Encounters:   02/14/22 141 lb (64 kg)   07/08/21 140 lb (63.5 kg)   06/23/21 140 lb 12.8 oz (63.9 kg)     BP Readings from Last 3 Encounters:   02/14/22 134/68   07/08/21 125/60   06/23/21 (!) 92/51     Body mass index is 22.76 kg/m². Constitutional: Patient appears well-developed and well-nourished. No distress. Head: Normocephalic and atraumatic. Eyes: no scleral icterus  Ears: Normal bilateral external ears, ear canals, and tympanic membranes    Oropharyngeal exam: mucous membranes moist, pharynx normal without lesions. Neck: Normal range of motion. Neck supple. No thyroidmegaly. No Carotid bruits. Cardiovascular: Normal rate, regular rhythm, normal heart sounds and intact distal pulses. Pulmonary/Chest: Effort normal and breath sounds normal. No stridor. No respiratory distress. No wheezes and no rales. Abdominal: Soft. Bowel sounds are normal. No distension and no mass. No tenderness. No rebound and no guarding. Musculoskeletal: No edema and no tenderness. Skin: No rash or erythema. Psychiatric: Normal mood and affect. Behavior is normal.       Assessment       Diagnosis Orders   1. Hyponatremia  OSMOLALITY, URINE    CORTISOL AM    SODIUM, URINE, RANDOM    POTASSIUM, URINE, RANDOM    CT ABDOMEN PELVIS W IV CONTRAST Additional Contrast? Radiologist Recommendation   2. Hyperbilirubinemia  CT ABDOMEN PELVIS W IV CONTRAST Additional Contrast? Radiologist Recommendation   3. Epigastric discomfort  CT ABDOMEN PELVIS W IV CONTRAST Additional Contrast? Radiologist Recommendation   4. Anemia, unspecified type     5. Hyperproteinemia     6. Hypertension, essential  lisinopril (PRINIVIL;ZESTRIL) 20 MG tablet   7. Hyperlipidemia, mixed  atorvastatin (LIPITOR) 20 MG tablet            Plan      Mimi Ramires was seen today for hypertension and colon cancer screening. Diagnoses and all orders for this visit:    Hyponatremia  -     Cancel: OSMOLALITY; Future  -     OSMOLALITY, URINE; Future  -     CORTISOL AM; Future  -     SODIUM, URINE, RANDOM; Future  -     POTASSIUM, URINE, RANDOM; Future  -     Cancel: TSH with Reflex; Future  -     Cancel: Comprehensive Metabolic Panel; Future  -     CT ABDOMEN PELVIS W IV CONTRAST Additional Contrast? Radiologist Recommendation; Future  Pt has always had mild hyponatremia but this has been more pronounced on recent labs. Unsure of etiology. Possible SIADH vs adrenal insufficiency vs other etiology. SIADH could potentially be due to lisinopril vs idiopathic vs malignancy vs other. Will check labs as above. Hyperbilirubinemia  -     Cancel: Comprehensive Metabolic Panel; Future  -     CT ABDOMEN PELVIS W IV CONTRAST Additional Contrast? Radiologist Recommendation; Future  New finding- moderate. Will check CT abd/pelvis. Epigastric discomfort  -     CT ABDOMEN PELVIS W IV CONTRAST Additional Contrast? Radiologist Recommendation; Future  Pt thought due to heartburn/coffee ingestion, but with hyperbilirubinemia and electrolyte abnormalities, I wonder about other etiologies. Check CT as above. Anemia, unspecified type  -     Cancel: Path Review, Smear; Future  Question if related to other lab abnormalities or independent finding. Will check peripheral smear. Other labs as ordered elsewhere. Discussed that he will need colonoscopy in the near future to R/O polyps as source of occult GI blood loss.  (Pt does have hemorrhoids, but has had no recent bleeding.)    Hyperproteinemia  -     Cancel: Path Review, Smear; Future  -     Cancel: PROTEIN ELECTROPHORESIS, SERUM; Future  Check labs as above. Hypertension, essential  -     lisinopril (PRINIVIL;ZESTRIL) 20 MG tablet; Take 1 tablet by mouth daily  BP stable, well controlled. Continue current medicines for now. If CT normal, consider switching to different BP med to see if SIADH picture improves off lisinopril. Hyperlipidemia, mixed  -     atorvastatin (LIPITOR) 20 MG tablet; Take 1 tablet by mouth every evening TAKE 1 TABLET DAILY  Stable, well controlled. Continue current medicines. Continue healthy lifestyle changes (diet/exercise/attempt weight loss). Return based on above test results. Regular follow up will be in 6 months for fasting physical + recheck BP, lipids.       Time spent on encounter: 40 minutes

## 2022-02-15 ENCOUNTER — HOSPITAL ENCOUNTER (OUTPATIENT)
Age: 47
Discharge: HOME | End: 2022-02-15
Payer: COMMERCIAL

## 2022-02-15 LAB
A/G RATIO: 0.4 (ref 1.1–2.2)
ALBUMIN SERPL-MCNC: 3.6 G/DL (ref 3.4–5)
ALP BLD-CCNC: 62 U/L (ref 40–129)
ALT SERPL-CCNC: 24 U/L (ref 10–40)
ANION GAP SERPL CALCULATED.3IONS-SCNC: 8 MMOL/L (ref 3–16)
AST SERPL-CCNC: 25 U/L (ref 15–37)
BILIRUB SERPL-MCNC: 2 MG/DL (ref 0–1)
BLOOD SMEAR REVIEW: NORMAL
BUN BLDV-MCNC: 12 MG/DL (ref 7–20)
CALCIUM SERPL-MCNC: 8.7 MG/DL (ref 8.3–10.6)
CHLORIDE BLD-SCNC: 100 MMOL/L (ref 99–110)
CO2: 25 MMOL/L (ref 21–32)
CORTISOL - AM: 9.5 UG/DL (ref 4.3–22.4)
CREAT SERPL-MCNC: 0.8 MG/DL (ref 0.9–1.3)
GFR AFRICAN AMERICAN: >60
GFR NON-AFRICAN AMERICAN: >60
GLUCOSE BLD-MCNC: 79 MG/DL (ref 70–99)
OSMOLALITY: 299 MOSM/KG (ref 275–295)
POTASSIUM SERPL-SCNC: 4.5 MMOL/L (ref 3.5–5.1)
SODIUM BLD-SCNC: 133 MMOL/L (ref 136–145)
TOTAL PROTEIN: 13.7 G/DL (ref 6.4–8.2)
TSH REFLEX: 2.4 UIU/ML (ref 0.27–4.2)

## 2022-02-15 PROCEDURE — 80053 COMPREHEN METABOLIC PANEL: CPT

## 2022-02-15 PROCEDURE — 83930 ASSAY OF BLOOD OSMOLALITY: CPT

## 2022-02-15 PROCEDURE — 86334 IMMUNOFIX E-PHORESIS SERUM: CPT

## 2022-02-15 PROCEDURE — 82784 ASSAY IGA/IGD/IGG/IGM EACH: CPT

## 2022-02-15 PROCEDURE — 84165 PROTEIN E-PHORESIS SERUM: CPT

## 2022-02-15 PROCEDURE — 82533 TOTAL CORTISOL: CPT

## 2022-02-15 PROCEDURE — 36415 COLL VENOUS BLD VENIPUNCTURE: CPT

## 2022-02-15 PROCEDURE — 84443 ASSAY THYROID STIM HORMONE: CPT

## 2022-02-15 PROCEDURE — 84155 ASSAY OF PROTEIN SERUM: CPT

## 2022-02-15 ASSESSMENT — ENCOUNTER SYMPTOMS
RHINORRHEA: 0
TROUBLE SWALLOWING: 0
WHEEZING: 0
SINUS PRESSURE: 0
CHEST TIGHTNESS: 0
BACK PAIN: 0
DIARRHEA: 0
EYE PAIN: 0
ABDOMINAL PAIN: 1
NAUSEA: 0
COUGH: 0
CONSTIPATION: 0
EYE REDNESS: 0
EYE DISCHARGE: 0
SHORTNESS OF BREATH: 0
COLOR CHANGE: 0
SORE THROAT: 0
VOMITING: 0

## 2022-02-16 LAB
ALBUMIN SERPL-MCNC: 5.4 G/DL (ref 3.1–4.9)
ALPHA-1-GLOBULIN: 0.3 G/DL (ref 0.2–0.4)
ALPHA-2-GLOBULIN: 0.9 G/DL (ref 0.4–1.1)
BETA GLOBULIN: 1.2 G/DL (ref 0.9–1.6)
GAMMA GLOBULIN: 6 G/DL (ref 0.6–1.8)
HEMATOLOGY PATH CONSULT: NORMAL
IGA: <10 MG/DL (ref 70–400)
IGG: 8604 MG/DL (ref 700–1600)
IGM: 5 MG/DL (ref 40–230)
M SPIKE 1 SERUM PROTEIN ELEC: 5.7 G/DL
SPE/IFE INTERPRETATION: NORMAL

## 2022-02-17 DIAGNOSIS — R77.8 ABNORMAL SERUM PROTEIN ELECTROPHORESIS: Primary | ICD-10-CM

## 2022-02-19 ENCOUNTER — HOSPITAL ENCOUNTER (OUTPATIENT)
Dept: CT IMAGING | Age: 47
Discharge: HOME | End: 2022-02-19
Payer: COMMERCIAL

## 2022-02-19 DIAGNOSIS — R10.13 EPIGASTRIC DISCOMFORT: ICD-10-CM

## 2022-02-19 DIAGNOSIS — E80.6 HYPERBILIRUBINEMIA: ICD-10-CM

## 2022-02-19 DIAGNOSIS — E87.1 HYPONATREMIA: ICD-10-CM

## 2022-02-19 PROCEDURE — 74177 CT ABD & PELVIS W/CONTRAST: CPT

## 2022-02-19 PROCEDURE — 6360000004 HC RX CONTRAST MEDICATION: Performed by: FAMILY MEDICINE

## 2022-02-19 RX ADMIN — IOHEXOL 50 ML: 240 INJECTION, SOLUTION INTRATHECAL; INTRAVASCULAR; INTRAVENOUS; ORAL at 10:11

## 2022-02-19 RX ADMIN — IOPAMIDOL 75 ML: 755 INJECTION, SOLUTION INTRAVENOUS at 10:05

## 2022-02-24 ENCOUNTER — TELEPHONE (OUTPATIENT)
Dept: INTERNAL MEDICINE CLINIC | Age: 47
End: 2022-02-24

## 2022-02-24 NOTE — TELEPHONE ENCOUNTER
Called and spoke with patient and he stated he will be here Monday morning to  the FMLA Papers at the office. I also told him to bring ID to verify it was him.

## 2022-02-28 ENCOUNTER — HOSPITAL ENCOUNTER (OUTPATIENT)
Age: 47
Setting detail: SPECIMEN
Discharge: HOME | End: 2022-02-28
Payer: COMMERCIAL

## 2022-02-28 DIAGNOSIS — E87.1 HYPONATREMIA: ICD-10-CM

## 2022-02-28 DIAGNOSIS — R77.8 ABNORMAL SERUM PROTEIN ELECTROPHORESIS: ICD-10-CM

## 2022-02-28 PROCEDURE — 84166 PROTEIN E-PHORESIS/URINE/CSF: CPT

## 2022-02-28 PROCEDURE — 86335 IMMUNFIX E-PHORSIS/URINE/CSF: CPT

## 2022-02-28 PROCEDURE — 84156 ASSAY OF PROTEIN URINE: CPT

## 2022-03-04 LAB — URINE ELECTROPHORESIS INTERP: NORMAL

## 2022-03-05 LAB
24HR URINE VOLUME (ML): 2950 ML
M SPIKE 1 24HR URINE PROTEIN ELEC: 0.04 G/DL
PROTEIN 24 HOUR URINE: 1.62 G/24HR

## 2022-04-12 PROBLEM — C90.00 MULTIPLE MYELOMA NOT HAVING ACHIEVED REMISSION (HCC): Status: ACTIVE | Noted: 2022-04-12

## 2022-04-13 ENCOUNTER — HOSPITAL ENCOUNTER (OUTPATIENT)
Age: 47
Setting detail: SPECIMEN
Discharge: HOME | End: 2022-04-13

## 2022-05-23 LAB
ALBUMIN SERPL-MCNC: 3.7 G/DL
ALP BLD-CCNC: 133 U/L
ALT SERPL-CCNC: 69 U/L
ANION GAP SERPL CALCULATED.3IONS-SCNC: 1.2 MMOL/L
AST SERPL-CCNC: 31 U/L
BASOPHILS ABSOLUTE: ABNORMAL
BASOPHILS RELATIVE PERCENT: 0.3 %
BILIRUB SERPL-MCNC: 0.5 MG/DL (ref 0.1–1.4)
BUN BLDV-MCNC: 18 MG/DL
CALCIUM SERPL-MCNC: 9.4 MG/DL
CHLORIDE BLD-SCNC: 101 MMOL/L
CO2: 30.2 MMOL/L
CREAT SERPL-MCNC: 0.81 MG/DL
EOSINOPHILS ABSOLUTE: ABNORMAL
EOSINOPHILS RELATIVE PERCENT: 0.2 %
GFR CALCULATED: >60
GLUCOSE BLD-MCNC: 186 MG/DL
HCT VFR BLD CALC: 38.4 % (ref 41–53)
HEMOGLOBIN: 13.2 G/DL (ref 13.5–17.5)
LYMPHOCYTES ABSOLUTE: ABNORMAL
LYMPHOCYTES RELATIVE PERCENT: 3.7 %
MCH RBC QN AUTO: 33.3 PG
MCHC RBC AUTO-ENTMCNC: 34.4 G/DL
MCV RBC AUTO: 97 FL
MONOCYTES ABSOLUTE: ABNORMAL
MONOCYTES RELATIVE PERCENT: 0.6 %
NEUTROPHILS ABSOLUTE: ABNORMAL
NEUTROPHILS RELATIVE PERCENT: 95.2 %
PLATELET # BLD: 239 K/ΜL
PMV BLD AUTO: ABNORMAL FL
POTASSIUM SERPL-SCNC: 5 MMOL/L
RBC # BLD: 3.96 10^6/ΜL
SODIUM BLD-SCNC: 138 MMOL/L
TOTAL PROTEIN: 6.7
WBC # BLD: 6.5 10^3/ML

## 2022-05-31 LAB
ALBUMIN SERPL-MCNC: 3.6 G/DL
ALP BLD-CCNC: 100 U/L
ALT SERPL-CCNC: 42 U/L
ANION GAP SERPL CALCULATED.3IONS-SCNC: 1.4 MMOL/L
AST SERPL-CCNC: 22 U/L
BASOPHILS ABSOLUTE: ABNORMAL
BASOPHILS RELATIVE PERCENT: 0.2 %
BILIRUB SERPL-MCNC: 0.7 MG/DL (ref 0.1–1.4)
BUN BLDV-MCNC: 16 MG/DL
CALCIUM SERPL-MCNC: 9.5 MG/DL
CHLORIDE BLD-SCNC: 104 MMOL/L
CO2: 31.5 MMOL/L
CREAT SERPL-MCNC: 20 MG/DL
EOSINOPHILS ABSOLUTE: ABNORMAL
EOSINOPHILS RELATIVE PERCENT: 9.5 %
GFR CALCULATED: >60
GLUCOSE BLD-MCNC: 69 MG/DL
HCT VFR BLD CALC: 40 % (ref 41–53)
HEMOGLOBIN: 13.8 G/DL (ref 13.5–17.5)
LYMPHOCYTES ABSOLUTE: ABNORMAL
LYMPHOCYTES RELATIVE PERCENT: 0.55 %
MCH RBC QN AUTO: 33.3 PG
MCHC RBC AUTO-ENTMCNC: 34.5 G/DL
MCV RBC AUTO: 96.4 FL
MONOCYTES ABSOLUTE: ABNORMAL
MONOCYTES RELATIVE PERCENT: 5.7 %
NEUTROPHILS ABSOLUTE: ABNORMAL
NEUTROPHILS RELATIVE PERCENT: 3.28 %
PLATELET # BLD: 135 K/ΜL
PMV BLD AUTO: ABNORMAL FL
POTASSIUM SERPL-SCNC: 4.6 MMOL/L
RBC # BLD: 4.15 10^6/ΜL
SODIUM BLD-SCNC: 141 MMOL/L
TOTAL PROTEIN: 6.1
WBC # BLD: 4.5 10^3/ML

## 2022-06-13 LAB
ALBUMIN SERPL-MCNC: 3.5 G/DL
ALP BLD-CCNC: 82 U/L
ALT SERPL-CCNC: 40 U/L
ANION GAP SERPL CALCULATED.3IONS-SCNC: 1.6 MMOL/L
AST SERPL-CCNC: 23 U/L
BASOPHILS ABSOLUTE: ABNORMAL
BASOPHILS RELATIVE PERCENT: 1.1 %
BILIRUB SERPL-MCNC: 0.6 MG/DL (ref 0.1–1.4)
BUN BLDV-MCNC: 19 MG/DL
CALCIUM SERPL-MCNC: 9 MG/DL
CHLORIDE BLD-SCNC: 106 MMOL/L
CO2: 27.4 MMOL/L
CREAT SERPL-MCNC: 0.82 MG/DL
EOSINOPHILS ABSOLUTE: ABNORMAL
EOSINOPHILS RELATIVE PERCENT: 4.8 %
GFR CALCULATED: >60
GLUCOSE BLD-MCNC: 123 MG/DL
HCT VFR BLD CALC: 13.8 % (ref 41–53)
HEMOGLOBIN: 13.8 G/DL (ref 13.5–17.5)
LYMPHOCYTES ABSOLUTE: ABNORMAL
LYMPHOCYTES RELATIVE PERCENT: 17.7 %
MCH RBC QN AUTO: 32.8 PG
MCHC RBC AUTO-ENTMCNC: 33.1 G/DL
MCV RBC AUTO: 40.9 FL
MONOCYTES ABSOLUTE: ABNORMAL
MONOCYTES RELATIVE PERCENT: 16.8 %
NEUTROPHILS ABSOLUTE: ABNORMAL
NEUTROPHILS RELATIVE PERCENT: 59.6 %
PLATELET # BLD: 159 K/ΜL
PMV BLD AUTO: ABNORMAL FL
POTASSIUM SERPL-SCNC: 4.2 MMOL/L
RBC # BLD: 4.17 10^6/ΜL
SODIUM BLD-SCNC: 142 MMOL/L
TOTAL PROTEIN: 5.7
WBC # BLD: 5.4 10^3/ML

## 2022-06-21 LAB
ALBUMIN SERPL-MCNC: 3.6 G/DL
ALP BLD-CCNC: 72 U/L
ALT SERPL-CCNC: 32 U/L
ANION GAP SERPL CALCULATED.3IONS-SCNC: 1.6 MMOL/L
AST SERPL-CCNC: 22 U/L
BASOPHILS ABSOLUTE: ABNORMAL
BASOPHILS RELATIVE PERCENT: 0.2 %
BILIRUB SERPL-MCNC: 0.7 MG/DL (ref 0.1–1.4)
BUN BLDV-MCNC: 16 MG/DL
CALCIUM SERPL-MCNC: 9.4 MG/DL
CHLORIDE BLD-SCNC: 102 MMOL/L
CO2: 29.9 MMOL/L
CREAT SERPL-MCNC: 0.81 MG/DL
EOSINOPHILS ABSOLUTE: ABNORMAL
EOSINOPHILS RELATIVE PERCENT: 6.3 %
GFR CALCULATED: >60
GLUCOSE BLD-MCNC: 79 MG/DL
HCT VFR BLD CALC: 40.9 % (ref 41–53)
HEMOGLOBIN: 14 G/DL (ref 13.5–17.5)
LYMPHOCYTES ABSOLUTE: ABNORMAL
LYMPHOCYTES RELATIVE PERCENT: 9.9 %
MCH RBC QN AUTO: 32.7 PG
MCHC RBC AUTO-ENTMCNC: 34.2 G/DL
MCV RBC AUTO: 95.6 FL
MONOCYTES ABSOLUTE: ABNORMAL
MONOCYTES RELATIVE PERCENT: 5.8 %
NEUTROPHILS ABSOLUTE: ABNORMAL
NEUTROPHILS RELATIVE PERCENT: 77.8 %
PLATELET # BLD: 119 K/ΜL
PMV BLD AUTO: ABNORMAL FL
POTASSIUM SERPL-SCNC: 4.7 MMOL/L
RBC # BLD: 4.28 10^6/ΜL
SODIUM BLD-SCNC: 139 MMOL/L
TOTAL PROTEIN: 5.8
WBC # BLD: 5 10^3/ML

## 2022-06-24 LAB
BASOPHILS ABSOLUTE: ABNORMAL
BASOPHILS RELATIVE PERCENT: 0.1 %
EOSINOPHILS ABSOLUTE: ABNORMAL
EOSINOPHILS RELATIVE PERCENT: 0.7 %
HCT VFR BLD CALC: 40.8 % (ref 41–53)
HEMOGLOBIN: 14.1 G/DL (ref 13.5–17.5)
LYMPHOCYTES ABSOLUTE: ABNORMAL
LYMPHOCYTES RELATIVE PERCENT: 5.7 %
MCH RBC QN AUTO: 32.8 PG
MCHC RBC AUTO-ENTMCNC: 34.6 G/DL
MCV RBC AUTO: 94.9 FL
MONOCYTES ABSOLUTE: ABNORMAL
MONOCYTES RELATIVE PERCENT: 5.4 %
NEUTROPHILS ABSOLUTE: 9.3 /ΜL
NEUTROPHILS RELATIVE PERCENT: 88.1 %
PLATELET # BLD: 102 K/ΜL
PMV BLD AUTO: ABNORMAL FL
RBC # BLD: 4.3 10^6/ΜL
WBC # BLD: 10.6 10^3/ML

## 2022-06-28 DIAGNOSIS — R06.02 SHORTNESS OF BREATH: Primary | ICD-10-CM

## 2022-07-06 ENCOUNTER — HOSPITAL ENCOUNTER (OUTPATIENT)
Dept: PULMONOLOGY | Age: 47
Discharge: HOME | End: 2022-07-06
Payer: COMMERCIAL

## 2022-07-06 ENCOUNTER — HOSPITAL ENCOUNTER (OUTPATIENT)
Dept: ONCOLOGY | Age: 47
Setting detail: INFUSION SERIES
Discharge: HOME | End: 2022-07-06
Payer: COMMERCIAL

## 2022-07-06 ENCOUNTER — HOSPITAL ENCOUNTER (OUTPATIENT)
Dept: GENERAL RADIOLOGY | Age: 47
Discharge: HOME | End: 2022-07-06
Payer: COMMERCIAL

## 2022-07-06 VITALS
BODY MASS INDEX: 22.83 KG/M2 | WEIGHT: 145.44 LBS | HEIGHT: 67 IN | RESPIRATION RATE: 18 BRPM | DIASTOLIC BLOOD PRESSURE: 70 MMHG | SYSTOLIC BLOOD PRESSURE: 108 MMHG | OXYGEN SATURATION: 98 % | TEMPERATURE: 98 F | HEART RATE: 65 BPM

## 2022-07-06 DIAGNOSIS — R06.02 SHORTNESS OF BREATH: ICD-10-CM

## 2022-07-06 LAB
A/G RATIO: 2.4 (ref 1.1–2.2)
ABO/RH: NORMAL
ALBUMIN SERPL-MCNC: 4.4 G/DL (ref 3.4–5)
ALP BLD-CCNC: 71 U/L (ref 40–129)
ALT SERPL-CCNC: 27 U/L (ref 10–40)
ANION GAP SERPL CALCULATED.3IONS-SCNC: 7 MMOL/L (ref 3–16)
ANTIBODY SCREEN: NORMAL
APTT: 26.1 SEC (ref 23–34.3)
AST SERPL-CCNC: 20 U/L (ref 15–37)
BASOPHILS ABSOLUTE: 0 K/UL (ref 0–0.2)
BASOPHILS RELATIVE PERCENT: 0.8 %
BILIRUB SERPL-MCNC: 0.5 MG/DL (ref 0–1)
BILIRUBIN DIRECT: <0.2 MG/DL (ref 0–0.3)
BILIRUBIN URINE: NEGATIVE
BILIRUBIN, INDIRECT: NORMAL MG/DL (ref 0–1)
BLOOD, URINE: NEGATIVE
BUN BLDV-MCNC: 16 MG/DL (ref 7–20)
CALCIUM SERPL-MCNC: 8.9 MG/DL (ref 8.3–10.6)
CHLORIDE BLD-SCNC: 100 MMOL/L (ref 99–110)
CLARITY: CLEAR
CO2: 30 MMOL/L (ref 21–32)
COLOR: YELLOW
CREAT SERPL-MCNC: 0.8 MG/DL (ref 0.9–1.3)
EKG ATRIAL RATE: 57 BPM
EKG DIAGNOSIS: NORMAL
EKG P AXIS: 66 DEGREES
EKG P-R INTERVAL: 178 MS
EKG Q-T INTERVAL: 404 MS
EKG QRS DURATION: 84 MS
EKG QTC CALCULATION (BAZETT): 393 MS
EKG R AXIS: 82 DEGREES
EKG T AXIS: 48 DEGREES
EKG VENTRICULAR RATE: 57 BPM
EOSINOPHILS ABSOLUTE: 0.1 K/UL (ref 0–0.6)
EOSINOPHILS RELATIVE PERCENT: 1.5 %
GFR AFRICAN AMERICAN: >60
GFR NON-AFRICAN AMERICAN: >60
GLUCOSE BLD-MCNC: 92 MG/DL (ref 70–99)
GLUCOSE URINE: NEGATIVE MG/DL
HAV IGM SER IA-ACNC: NORMAL
HBV SURFACE AB TITR SER: 37.98 MIU/ML
HCT VFR BLD CALC: 39.9 % (ref 40.5–52.5)
HEMOGLOBIN: 13.7 G/DL (ref 13.5–17.5)
INR BLD: 0.97 (ref 0.87–1.14)
KETONES, URINE: NEGATIVE MG/DL
LACTATE DEHYDROGENASE: 184 U/L (ref 100–190)
LEUKOCYTE ESTERASE, URINE: NEGATIVE
LYMPHOCYTES ABSOLUTE: 0.7 K/UL (ref 1–5.1)
LYMPHOCYTES RELATIVE PERCENT: 13.1 %
MCH RBC QN AUTO: 32.7 PG (ref 26–34)
MCHC RBC AUTO-ENTMCNC: 34.2 G/DL (ref 31–36)
MCV RBC AUTO: 95.6 FL (ref 80–100)
MICROSCOPIC EXAMINATION: NORMAL
MONOCYTES ABSOLUTE: 0.8 K/UL (ref 0–1.3)
MONOCYTES RELATIVE PERCENT: 15.7 %
NEUTROPHILS ABSOLUTE: 3.5 K/UL (ref 1.7–7.7)
NEUTROPHILS RELATIVE PERCENT: 68.9 %
NITRITE, URINE: NEGATIVE
PDW BLD-RTO: 13.7 % (ref 12.4–15.4)
PH UA: 6 (ref 5–8)
PHOSPHORUS: 4.2 MG/DL (ref 2.5–4.9)
PLATELET # BLD: 172 K/UL (ref 135–450)
PMV BLD AUTO: 8.5 FL (ref 5–10.5)
POTASSIUM SERPL-SCNC: 4.4 MMOL/L (ref 3.5–5.1)
PROTEIN UA: NEGATIVE MG/DL
PROTHROMBIN TIME: 12.8 SEC (ref 11.7–14.5)
RBC # BLD: 4.18 M/UL (ref 4.2–5.9)
SODIUM BLD-SCNC: 137 MMOL/L (ref 136–145)
SPECIFIC GRAVITY UA: 1.02 (ref 1–1.03)
TOTAL PROTEIN: 6.2 G/DL (ref 6.4–8.2)
URIC ACID, SERUM: 3 MG/DL (ref 3.5–7.2)
URINE REFLEX TO CULTURE: NORMAL
URINE TYPE: NORMAL
UROBILINOGEN, URINE: 0.2 E.U./DL
WBC # BLD: 5.1 K/UL (ref 4–11)

## 2022-07-06 PROCEDURE — 84100 ASSAY OF PHOSPHORUS: CPT

## 2022-07-06 PROCEDURE — 82248 BILIRUBIN DIRECT: CPT

## 2022-07-06 PROCEDURE — 86901 BLOOD TYPING SEROLOGIC RH(D): CPT

## 2022-07-06 PROCEDURE — 93010 ELECTROCARDIOGRAM REPORT: CPT | Performed by: INTERNAL MEDICINE

## 2022-07-06 PROCEDURE — 86645 CMV ANTIBODY IGM: CPT

## 2022-07-06 PROCEDURE — 83615 LACTATE (LD) (LDH) ENZYME: CPT

## 2022-07-06 PROCEDURE — 94664 DEMO&/EVAL PT USE INHALER: CPT

## 2022-07-06 PROCEDURE — 86709 HEPATITIS A IGM ANTIBODY: CPT

## 2022-07-06 PROCEDURE — 86706 HEP B SURFACE ANTIBODY: CPT

## 2022-07-06 PROCEDURE — 86707 HEPATITIS BE ANTIBODY: CPT

## 2022-07-06 PROCEDURE — 86850 RBC ANTIBODY SCREEN: CPT

## 2022-07-06 PROCEDURE — 86900 BLOOD TYPING SEROLOGIC ABO: CPT

## 2022-07-06 PROCEDURE — 94760 N-INVAS EAR/PLS OXIMETRY 1: CPT

## 2022-07-06 PROCEDURE — 86694 HERPES SIMPLEX NES ANTBDY: CPT

## 2022-07-06 PROCEDURE — 93005 ELECTROCARDIOGRAM TRACING: CPT

## 2022-07-06 PROCEDURE — 85730 THROMBOPLASTIN TIME PARTIAL: CPT

## 2022-07-06 PROCEDURE — 94060 EVALUATION OF WHEEZING: CPT

## 2022-07-06 PROCEDURE — 80307 DRUG TEST PRSMV CHEM ANLYZR: CPT

## 2022-07-06 PROCEDURE — G0480 DRUG TEST DEF 1-7 CLASSES: HCPCS

## 2022-07-06 PROCEDURE — 94729 DIFFUSING CAPACITY: CPT

## 2022-07-06 PROCEDURE — 81003 URINALYSIS AUTO W/O SCOPE: CPT

## 2022-07-06 PROCEDURE — 84550 ASSAY OF BLOOD/URIC ACID: CPT

## 2022-07-06 PROCEDURE — 94726 PLETHYSMOGRAPHY LUNG VOLUMES: CPT

## 2022-07-06 PROCEDURE — 86787 VARICELLA-ZOSTER ANTIBODY: CPT

## 2022-07-06 PROCEDURE — 85025 COMPLETE CBC W/AUTO DIFF WBC: CPT

## 2022-07-06 PROCEDURE — 80053 COMPREHEN METABOLIC PANEL: CPT

## 2022-07-06 PROCEDURE — 85610 PROTHROMBIN TIME: CPT

## 2022-07-06 PROCEDURE — 71046 X-RAY EXAM CHEST 2 VIEWS: CPT

## 2022-07-06 ASSESSMENT — PAIN SCALES - GENERAL: PAINLEVEL_OUTOF10: 0

## 2022-07-06 NOTE — CARE COORDINATION
Fellow transplant coordinator, Lizy Lambert, and I met with patient and his wife, Aleyda Burns, to discuss potential transplant. Reviewed stem cell mobilization/collection process, potential hospital admission, possible side effects, and long term issues. Informed of medical tests and evaluations needed prior to transplant. Discussed need for continous caregiver along with restrictions for infection prevention. Informed of need for frequent follow up visits  Answered questions appropriately and instructed to call with further questions. Will follow up during next scheduled appointment.

## 2022-07-07 ENCOUNTER — PROCEDURE VISIT (OUTPATIENT)
Dept: CARDIOLOGY CLINIC | Age: 47
End: 2022-07-07
Payer: COMMERCIAL

## 2022-07-07 DIAGNOSIS — R06.02 SHORTNESS OF BREATH: ICD-10-CM

## 2022-07-07 LAB
LV EF: 58 %
LVEF MODALITY: NORMAL

## 2022-07-07 PROCEDURE — 93306 TTE W/DOPPLER COMPLETE: CPT | Performed by: INTERNAL MEDICINE

## 2022-07-07 PROCEDURE — 93356 MYOCRD STRAIN IMG SPCKL TRCK: CPT | Performed by: INTERNAL MEDICINE

## 2022-07-08 LAB
CYTOMEGALOVIRUS IGM ANTIBODY: <8 AU/ML
HEPATITIS BE ANTIBODY: NEGATIVE

## 2022-07-08 NOTE — PROCEDURES
[address] [facility] [address] [address]               [address] [place], [address]                               PULMONARY FUNCTION    PATIENT NAME: Farooq Maravilla                 :        1975  MED REC NO:   [id number]                          ROOM:  ACCOUNT NO:   [de-identified]                           ADMIT DATE: 2022  PROVIDER:     Mally Kaiser MD    DATE OF PROCEDURE:  2022    INDICATION:  Shortness of breath. BMI:  22.4. TOBACCO HISTORY:  Never smoked. Spirometry data is acceptable and reproducible. Lung volumes performed via plethysmography. DLCO is adjusted for hemoglobin of 13.7 via [place]ara method. Room air oxygen saturation is 97%. SPIROMETRY:  FEV1/FVC ratio is 83%. FEV1 is 3.74, which is 107% of  predicted. FVC is 4.5, which is 101% of predicted. Lung volumes showed total lung capacity of 6.68, which is 108% of  predicted. Residual volume is 2.23, which is 124% of predicted. Diffusion capacity is 24.76, which is 75% of predicted. IMPRESSION:  1. Normal spirometry. 2.  Air trapping is present. 3.  Mild decrease in diffusion capacity.         Sarabjit Alfred MD    D: 2022 16:17:58       T: 2022 21:26:55     [email or web address]  Job#: [id number]     Doc#: [id number]    CC:

## 2022-07-09 LAB
AMPHETAMINES SCREEN BLOOD: NEGATIVE NG/ML
BARBITURATES SCREEN BLOOD: NEGATIVE NG/ML
BENZODIAZEPINES SCREEN BLOOD: NEGATIVE NG/ML
BUPRENORPHINE: NEGATIVE NG/ML
CANNABINOID SCREEN BLOOD: NEGATIVE NG/ML
COCAINE SCREEN BLOOD: NEGATIVE NG/ML
Lab: NORMAL
METHADONE SCREEN BLOOD: NEGATIVE NG/ML
METHAMPHETAMINES SERUM/ PLASMA: NEGATIVE NG/ML
OPIATES SCREEN BLOOD: NEGATIVE NG/ML
OXYCODONE: NEGATIVE NG/ML
PHENCYCLIDINE SCREEN BLOOD: NEGATIVE NG/ML
VARICELLA ZOSTER AB IGM: 0.04 ISR
VZV IGG SER QL IA: 47.9 IV

## 2022-07-10 LAB
3-OH-COTININE URINE: <50 NG/ML
ANABASINE URINE: <5 NG/ML
COTININE, URINE: <15 NG/ML
HERPES TYPE 1/2 IGM COMBINED: 0.16 IV
HERPES TYPE I/II IGG COMBINED: 0.4 IV
NICOTINE URINE: <15 NG/ML

## 2022-07-11 ENCOUNTER — HOSPITAL ENCOUNTER (OUTPATIENT)
Dept: ONCOLOGY | Age: 47
Setting detail: INFUSION SERIES
Discharge: HOME | End: 2022-07-11

## 2022-07-11 ENCOUNTER — CLINICAL DOCUMENTATION (OUTPATIENT)
Dept: ONCOLOGY | Age: 47
End: 2022-07-11

## 2022-07-11 NOTE — LETTER
[address] [address]  Phone: [phone]    Carri Valiente PSYD        July 11, 2022    [address] [address]      Dear Abelardo Rand:    Based on today's psychological evaluation, the following recommendations need to be addressed prior to your procedure:     Solidify a back-up caregiver plan    Once you have completed the above recommendations, please call psychologist at [phone]. You may leave a detailed message regarding how you addressed the above recommendations. If you have any questions or concerns, please don't hesitate to call.     Sincerely,        Carri Valiente PSYD

## 2022-07-11 NOTE — PROGRESS NOTES
Pre-Autologous Stem Cell Transplant Psychological Evaluation- 7/11/2022    PSYCHOSOCIAL ASSESSMENT/RECOMMENDATIONS    Based on this evaluation, the recommendation is to continue with monitoring of identified risk factors. Shahram Davies was informed of the following risk factors that have been identified and recommendations that are being made to the treatment team:      1) Solidify a back-up caregiver plan to Beto Posadas. Additional suggestions:  Complete Advanced Directive     Identified caregivers: Jordon Goel (spouse), Loispj Evelyn (friend), Derek Santos (daughter)   Protective factors: dietician, physically active   DSM-5 Diagnoses: None  ____________________________________________________________________________________________    Shahram Davies is a 49-year-old   male with multiple myeloma referred for a psychological evaluation in preparation for autologous stem cell transplant. He presented with his identified primary caregiver, Jordon Goel (his spouse). PAST MEDICAL HISTORY  Shahram Davies reports that he was diagnosed with multiple myeloma in 3/2022. He explains his experience with treatment thus far to be \"mostly fine, but has had neuropathy as a result of chemotherapy\". Past medical history is significant for hypertension, high cholesterol, GERD, and vitamin d deficiency.     KNOWLEDGE RELATED TO PROCEDURE    Diagnosis: Excellent  Procedure: Excellent  Risks and Benefits: Excellent  Lifestyle changes:    Hand-washing and hygiene:Excellent   People and Places: Excellent   Bleeding precautions: Excellent   Pets: Adequate, was provided additional education and expressed understanding   Sun exposure: Excellent   Driving restrictions: Excellent   Staying within 45 minutes of the hospital: Excellent   Physical activity: Excellent   Sexual activity: Excellent   Tobacco use: Excellent   Alcohol use: Adequate, was provided additional education and expressed understanding   Illicit drug use (including marijuana): Excellent   Food safety: Excellent   Nutrition: Excellent   Medication management: Excellent   Frequency of appointments: Excellent   When to call BMT doctor: Excellent   Going to the [facility] in case of emergency: Excellent   Possibility for admission and re-admission to the hospital: Excellent    ADHERENCE    No-showed appointments: Denies  Reports forgetting medications: Denies  Using pill box: Yes; Use of pill box was recommended: Yes  Is willing to allow caregiver to manage medications immediately following procedure: Yes  Currently requires assistance with taking medications: Denies  Endorses history of going against the medical advise of a provider: Denies  History of issues with adherence to medical recommendations for comorbidities: Denies  Motivation for procedure: 10/10    PSYCHOSOCIAL HISTORY    Marital Status:  for 24 years   If , describes marriage as: \"good\"  Race/Ethnicity: \"\"  Lives: [place], [place] (approximately 20 minutes from [facility] with wife, two children  Reports stable housing: Yes  Reliable transportation: Yes  Children: 3 (ages 12, 32, 15)  Employment: renal dietician full-time; using FMLA; short-term disability   Financial concerns related to this procedure: Reports concerns because he does not know what he will need to pay out of pocket, but does not have concerns about meeting his monthly financial needs  Childhood history: He was raised by mother and father in [place], [place] and has 3 siblings. Ashley Myers describes his childhood as \"fine\". Educational history: Endorses difficulties in school (e.g., learning disability, accommodations) including dyslexia. Highest level of education completed is bachelor's degree in science.    Congregational/spiritual beliefs: Congregation and denies Sabianism beliefs that would affect his medical care   service: 6 years in [employer] history (e.g. DUI/DWI, long-term/senior living, arrests, probation/parole, bankruptcy): Denies  Current stressors: when people are not doing their jobs correctly or when people do not go at a green light  Greatest stressor in life thus far: going back to college at age 27  Coping mechanisms: letting time pass, teaching scouts, gym   Physical activity: 6 days a week  Specific dietary needs: Denies;  Denies being a picky eater  Supplements or over-the-counter drugs: vitamin D     SUPPORT SYSTEM    Identified primary caregiver: Pancho Chacon  Relationship to patient: Spouse  Age: 44  Lives: [place], [place] to stay with patient: Yes  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; full-time; 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 for JAYS  Adequate knowledge regarding procedure: Yes  Diagnosis: Excellent  Procedure: Excellent  Risks and Benefits: Excellent  Lifestyle changes:     Hand-washing and hygiene:Excellent    People and Places: Excellent    Bleeding precautions: Good    Pets: Poor, was provided additional education and expressed understanding    Sun exposure: Poor, was provided additional education and expressed understanding    Driving restrictions: Excellent    Staying within 45 minutes of the hospital: Poor, was provided additional education and expressed understanding    Physical activity: Excellent    Sexual activity: Poor, was provided additional education and expressed understanding    Tobacco use: Excellent    Alcohol use: Excellent    Illicit drug use (including marijuana): Excellent    Food safety: Excellent    Medication management: Excellent    Frequency of appointments: Excellent    When to call BMT doctor: Excellent    [facility] case of emergency: Excellent    Possibility for admission and re-admission to the hospital: Excellent  Willingness to be a caregiver: Yes  Significant mental health conditions that would affect care giving: Denies  Significant substance abuse that would affect care giving: Denies  Significant medical conditions that would affect care giving: Denies  Smoke tobacco in the home: Denies  Known cognitive impairment: Denies  COVID vaccine: Yes    CAREGIVER: RAPID ESTIMATE OF ADULT LITERACY IN MEDICINE  REALM-R: 8/8; REALM-SF Score: N/A  (0= <3rd grade reading level; 1-3= 4-6th grade reading level; 4-6= 7-8th grade reading level; >7= >9th grade reading level)    CAREGIVER: EDWIGE COGNITIVE ASSESSMENT (Version 1): 28/30 (Within Normal Limits)  Difficulty noted in the area(s) of: None  Normal scores are ?26     Additional support people:   1) Mylene Leigh   Relationship to patient: Friend  Age: 37  Lives: [address], [place]  Employment: Not working  COVID vaccine: No   2) Itz Taveras   Relationship to patient: Daughter  Age: 32  Lives: [employer]  Employment: [place], full-time  COVID vaccine: Yes    Has completed an advanced directive: No, but would like the forms    PSYCHIATRIC HISTORY    Current mental health treatment: Denies  Past mental health treatment: Denies  Family psychiatric history: Anxiety (daughter), PTSD (daughter)    Depression: Denies   Suicidal ideation: Denies  Homicidal ideation: Denies  Past suicide attempts: Denies  Anxiety (i.e. Generalized Anxiety Disorder, health or treatment related anxiety, panic attacks, obsessions, or compulsions): States that he would get nervous about going to the doctor and getting test results, but denies   Medical Anxiety (e.g. needles, pills, MRI, hospitals): Denies   Eating disordered behavior (I.e. binging, purging, other compensatory behaviors, concern about body image): States that he likes to be at a certain weight because he feels that he looks best at this weight.  Denies ever being underweight   Nirali: Denies   Psychosis: Denies  Post-traumatic stress disorder: Denies  Developmental or learning disability: Dyslexia   Sleep: 6-7 hours per night; Endorses difficulties with sleep related to the neuropathy    THE DEPRESSION, ANXIETY, STRESS SCALE- 21 ITEMS (JOELLEN-21) : Typical cutoff scores:  Depression = 1 (Normal) (0-9=Normal, 10-13= Mild, 14-20= Moderate, 21-27= Severe, 28+= Extremely Severe)  Anxiety = 0 (Normal) (0-9=Normal, 10-13= Mild, 14-20= Moderate, 21-27= Severe, 28+= Extremely Severe)  Stress= 1 (Normal) (0-14=Normal, 15-18= Mild, 19-25= Moderate, 26-33= Severe, 34+= Extremely Severe)    When compared to a sample of the general population he scored in the following percentiles:  Depression= 31.8 (Normal range)  Anxiety= 26.2 (Normal range)  Stress= 18.7 (Normal range)    BRIEF COPING ORIENTATION TO PROBLEMS EXPERIENCED INVENTORY (BRIEF-COPE):    Results indicate primarily an Approach coping style. This coping style is characterized by the subscales of active coping, positive reframing, planning, acceptance, seeking emotional support, and seeking informational support. Approach Coping is associated with more helpful responses to adversity, including adaptive practical adjustment, better physical health outcomes and more stable emotional responding. Approach Coping:   Percentile= 0.2 (Raw score= 12)  Avoidant Coping   Percentile= 57.9 (Raw score=21)    WORLD HEALTH ORGANIZATION QUALITY OF LIFE ASSESSMENT (WHOQOL-BREF):     Scores indicate his quality of life in the following areas in percentiles:  Physical Health: 40.2 percentile (Transformed score= 69)  Psychological Health: 77.1 percentile (Transformed score= 81)  Social Relationships: 69.9 percentile (Transformed score= 81)  Environment: 84.1 percentile (Transformed score= 88)    SUBSTANCE USE HISTORY    Tobacco: Denies    Nicotine screen: Negative   Alcohol: Reports consuming alcohol twice monthly, no more than 2 per occasion. Denies history of problematic alcohol use. Illicit drugs: Denies    Drug screen: Negative    Daniela Torrez does not foresee difficulties with abstaining from these substances immediately following his procedure.     NEUROCOGNITIVE FUNCTIONING    Daniela Torrez denies a history of closed head injury, seizures, or stroke. PATIENT: RAPID ESTIMATE OF ADULT LITERACY IN MEDICINE  REALM-R: 8/8; REALM-SF Score: N/A  (0= <3rd grade reading level; 1-3= 4-6th grade reading level; 4-6= 7-8th grade reading level; >7= >9th grade reading level)    PATIENT EDWIGE COGNITIVE ASSESSMENT (Version 1): 28/30 (Within Normal Limits)  Difficulty noted in the area(s) of: None  Normal scores are ?26     BEHAVIORAL OBSERVATIONS    He arrived on time for his scheduled appointment. He presented with his wife as his caregiver and was dressed appropriately and groomed neatly. During the evaluation, he presented with normal speech and logical/linear thought processes. He was cooperative and friendly. His mood was good and affect was congruent. Patient's primary caregiver presented as cooperative and friendly. STANDARD ASSESSMENT FOR INTEGRATING DATA  PIPPA INTEGRATED PSYCHOSOCIAL ASSESSMENT FOR TRANSPLANT (SIPAT)= 8 ((7-20) Good Candidate: Recommendation is to continue, although monitoring of identified risk factors may be required)   SIPAT Score Interpretation: 0-6 Excellent Candidate, 7-20 Good Candidate, 21-39 Minimally Acceptance Candidate, 40-69 Poor Candidate, >70 High Risk Candidate  The total score was obtained through summing patient's score on the following categories:    Patient's Readiness Level  Knowledge and Understanding of Medical Illness Process: 0) Excellent Understanding: Patient and support system are fully aware of the causes of illness leading to need for transplantation. Both patient and support system demonstrate a high degree of self-directed learning  Knowledge and Understanding of the Process of Transplantation: 0) Excellent Understanding: High degree of self-directed learning and excellent knowledge of treatment risks & benefits. Willingness/Desire for Treatment: 0) Excellent: Patient is highly motivated and proactively involved in his/her medical care.   Treatment Compliance/Adherence: 0) Excellent: Patient is fully compliant and is an effective partner with medical staff. Lifestyle Factors: 0) Able to modify & sustain needed changes- self initiated. Patient's Readiness Level Total Score: 0    Social Support System  Availability of Social Support System: 2) Good: Various individuals (e.g., minimum of two people) have been identified and are actively engaged in the patient's care. A back-up system, albeit limited, seems feasible. Functionality of Social Support System: 0) Excellent: Members of the support team have demonstrated initiative in learning and are already committed to and actively and effectively engaged in the patient's care. Appropriateness of Physical Living Space and Environment: 0) Excellent: Patient has excellent, long-term, permanent and adequate housing. Social Support System Total Score: 2    Psychological Stability and Psychopathology   Presence of Psychopathology: 2) Mild Psychopathology - Present or History of mild psychopathology (e.g., Adjustment disorder). Usually a self-limited problem without significant negative impact on level of functioning. No hospitalization needed. No History of SI/SA. Assessment of Depression: 0) No Clinical Depression  Assessment of Anxiety: 0) No Clinical Anxiety  History of Organic Psychopathology or Neurocognitive Impairment: 0) None: No history of disease or treatment induced psychiatric problem. Assessment of Current Cognitive Functionin) Cognitive Functioning Within Normal Limits; or MoCA / MMSE . 26. Influence of Personality Traits vs. Disorder: 0) None: No history of significant personality disorder or psychopathology/traits. Effect of Truthfulness vs. Deceptive Behavior in Presentation: 0) No evidence of deceptive behavior in history or at present. Overall Risk for Psychopathology: 1) Minimal: History of acceptable coping with current or previous medical challenges or psychosocial stressors.  No psychiatric complications in response to illness, medical treatment or psychosocial stressors. Psychological Stability and Psychopathology Total Score: 3    Lifestyle and Effect of Substance Use  Alcohol Use/Abuse/Dependence: 2) ALCOHOL USE - NO ABUSE: History of minimal alcohol use which has caused no social or medical problems (i.e., no abuse). If requested by the team the patient promptly discontinued all alcohol use. Alcohol Risk for Recidivism: 1) Low Risk: (AUDIT 1 - 7). Substance Use/Abuse/Dependence: 0) None: No history of illicit substance use; or abuse of prescribed substances. Substance Use Risk for Recidivism: 0) None: No history of illicit substance Use; or abuse of prescribed substances (DAST = 0). Nicotine Use/Abuse/Dependence: 0) None: Never used tobacco in any form. No history of Nicotine Use/Abuse. Lifestyle and Effect of Substance Use Total Score: 3    -------------------------------------------------------------------------------------    Katelyn Reyez Psy.D., Clinical Psychologist      [facility] was seen for a pre-BMT/CAR-T psychological evaluation. There were no urgent psychological concerns (e.g. suicidal or homicidal ideation). Full report to follow. Prior to the evaluation, patient and/or support system was informed of the purposes of the evaluation, which include identification of any psychosocial barriers to successful BMT/CAR-T. Educated patient regarding psychologist's role in making treatment recommendations to patient's treatment team regarding patient's candidacy for BMT/CAR-T from a psychological standpoint. Discussed limits of confidentiality (e.g., documenting in patient's medical record, coordinating with team, abuse of vulnerable person, court subpoena, and/or being a risk to self or others). Also explained to patient that provider sees patient's while hospitalized in the [place] [id number].  Patient expressed understanding and was agreeable to complete the evaluation. All measures completed today were sent to medical records to be uploaded into patient's chart.

## 2022-07-18 ENCOUNTER — HOSPITAL ENCOUNTER (OUTPATIENT)
Dept: ONCOLOGY | Age: 47
Setting detail: INFUSION SERIES
Discharge: HOME | End: 2022-07-18
Payer: COMMERCIAL

## 2022-07-18 PROCEDURE — 85660 RBC SICKLE CELL TEST: CPT

## 2022-07-21 DIAGNOSIS — Z52.011 AUTOLOGOUS DONOR OF STEM CELLS: ICD-10-CM

## 2022-07-21 RX ORDER — PROCHLORPERAZINE MALEATE 10 MG
10 TABLET ORAL EVERY 6 HOURS PRN
Status: CANCELLED | OUTPATIENT
Start: 2022-07-26

## 2022-07-21 RX ORDER — LOPERAMIDE HYDROCHLORIDE 2 MG/1
2 CAPSULE ORAL PRN
Status: CANCELLED | OUTPATIENT
Start: 2022-07-26

## 2022-07-22 ENCOUNTER — HOSPITAL ENCOUNTER (OUTPATIENT)
Dept: INTERVENTIONAL RADIOLOGY/VASCULAR | Age: 47
Discharge: HOME | End: 2022-07-22
Payer: COMMERCIAL

## 2022-07-22 VITALS
BODY MASS INDEX: 21.86 KG/M2 | DIASTOLIC BLOOD PRESSURE: 86 MMHG | HEART RATE: 60 BPM | OXYGEN SATURATION: 100 % | WEIGHT: 136 LBS | SYSTOLIC BLOOD PRESSURE: 127 MMHG | HEIGHT: 66 IN | RESPIRATION RATE: 16 BRPM | TEMPERATURE: 97.9 F

## 2022-07-22 DIAGNOSIS — C90.00 MULTIPLE MYELOMA NOT HAVING ACHIEVED REMISSION (HCC): ICD-10-CM

## 2022-07-22 LAB
HCT VFR BLD CALC: 43.4 % (ref 40.5–52.5)
HEMOGLOBIN: 15.1 G/DL (ref 13.5–17.5)
INR BLD: 0.92 (ref 0.87–1.14)
MCH RBC QN AUTO: 32.9 PG (ref 26–34)
MCHC RBC AUTO-ENTMCNC: 34.9 G/DL (ref 31–36)
MCV RBC AUTO: 94.3 FL (ref 80–100)
MISCELLANEOUS LAB TEST ORDER: NORMAL
PDW BLD-RTO: 13.6 % (ref 12.4–15.4)
PLATELET # BLD: 155 K/UL (ref 135–450)
PMV BLD AUTO: 8.8 FL (ref 5–10.5)
PROTHROMBIN TIME: 12.2 SEC (ref 11.7–14.5)
RBC # BLD: 4.6 M/UL (ref 4.2–5.9)
WBC # BLD: 3.4 K/UL (ref 4–11)

## 2022-07-22 PROCEDURE — 2500000003 HC RX 250 WO HCPCS

## 2022-07-22 PROCEDURE — 99152 MOD SED SAME PHYS/QHP 5/>YRS: CPT

## 2022-07-22 PROCEDURE — 6360000002 HC RX W HCPCS

## 2022-07-22 PROCEDURE — C1894 INTRO/SHEATH, NON-LASER: HCPCS

## 2022-07-22 PROCEDURE — 36556 INSERT NON-TUNNEL CV CATH: CPT

## 2022-07-22 PROCEDURE — 36558 INSERT TUNNELED CV CATH: CPT

## 2022-07-22 PROCEDURE — 85027 COMPLETE CBC AUTOMATED: CPT

## 2022-07-22 PROCEDURE — 77001 FLUOROGUIDE FOR VEIN DEVICE: CPT

## 2022-07-22 PROCEDURE — 76937 US GUIDE VASCULAR ACCESS: CPT

## 2022-07-22 PROCEDURE — C1751 CATH, INF, PER/CENT/MIDLINE: HCPCS

## 2022-07-22 PROCEDURE — 85610 PROTHROMBIN TIME: CPT

## 2022-07-22 RX ORDER — ACETAMINOPHEN 325 MG/1
650 TABLET ORAL EVERY 4 HOURS PRN
Status: DISCONTINUED | OUTPATIENT
Start: 2022-07-22 | End: 2022-07-23 | Stop reason: HOSPADM

## 2022-07-22 RX ORDER — GABAPENTIN 300 MG/1
300 CAPSULE ORAL DAILY
COMMUNITY
End: 2022-08-15 | Stop reason: SDDI

## 2022-07-22 RX ORDER — LORATADINE 10 MG/1
10 CAPSULE, LIQUID FILLED ORAL DAILY
COMMUNITY
End: 2022-08-15

## 2022-07-22 RX ORDER — PANTOPRAZOLE SODIUM 40 MG/1
40 TABLET, DELAYED RELEASE ORAL DAILY
COMMUNITY
End: 2022-08-03

## 2022-07-22 NOTE — DISCHARGE INSTRUCTIONS
[facility]  Cardiovascular Special Procedures  CENTRAL LINE PLACEMENT     Patient Information:   Your line may be placed in the chest or peripherally in the arm. The following instructions are for both. Maintain dressing after the procedure. Leave dressing on for 7 days. If the dressing becomes moist, it should be changed. If there is any leakage at the incision site, notify your doctor. Keep site clean and dry for 7 days and observe for any signs of infection. Dressing should be changed with a sterile process. Bruising and mild discomfort are expected. You may apply an ice bag to the incision area to minimize bruising, discomfort, and swelling. Contact your physician who placed the Line for any bleeding or extreme pain. Other symptoms to report are as follows:     Fever. Skin warm to touch. Numbness or weakness. Swelling of neck or arm. Redness or Tenderness along the portal site and/or the catheter tract. Drainage from the portal site, or if dressing is damp or saturated. Strenuous activities and exercise should be approached gradually. You may shower as long as you keep the incision dry while line is in place. The incision should not be immersed in water until it is completely healed. You may contact [address] [place], [place] [address]. for any questions or problems that may occur at [phone] during the hours of 9am-4pm Monday-Friday, or the hospital  after hours at [phone], to have the interventional radiologist on call paged. [facility]  Cardiovascular Special Procedures  General Discharge Instructions      ____ You may be drowsy or lightheaded after receiving sedation.  DO NOT operate a vehicle (automobile, bicycle, motorcycle, machinery, or power tools), make any important decisions or sign any important/legal documents, or drink alcoholic beverages for the next 24 hours  ____ We strongly suggest that a responsible adult be with you for the next 24 hours for your protection and safety  ____ If the intravenous catheter site is painful, apply warm wet compresses on the site until the soreness is relieved and elevate the arm above the heart. Call your physician if no improvement in 2 to 3 days    DIETARY INSTRUCTIONS:    __x__ Drink extra fluids over the next 24 hours (If not contraindicated by illness or by                   physician order)  ____ Start with clear liquids and progress to normal diet as you feel like eating. If you experience nausea or repeated episodes of vomiting, which persist beyond 12-24 hours, notify your doctor        __x__ Resume your previous diet    ACTIVITY INSTRUCTIONS:    ____ See other instructions  ____ No special instructions  ____ Rest for 24 hours    ____ Up as tolerated  ____ Increase activity as tolerated    Wound/Dressing Instructions:  ____ See other instructions  __x__ May shower, tomorrow  ____ Remove bandage within 24 hours    MEDICATION INSTRUCTIONS:    ____ See Medication Reconciliation Sheet      SPECIAL INSTRUCTIONS:  __________________________________________________________________________________________________________________________________________________________________________________________________________________________________________                                                                                                                                                                                                                                                                                                  Please make sure that you follow-up with your doctors office for your results. Call: _________________________________     FOLLOW-UP APPOINTMENT    Follow up with MD as directed    Belongings returned to patient and/or family:     The Discharge Instructions have been explained to me.   I understand and can verbalize these instructions.

## 2022-07-22 NOTE — H&P
Patient:  Jaime Delgadillo   :   1975      Relevant clinical history, particularly as it involves the pending procedure, was reviewed and discussed. The procedure including risks and benefits was discussed at length with the patient (or designated family member) and all questions were answered. Informed consent to proceed with the procedure was given. Vital signs were monitored and documented by the Radiology nurse. Targeted physical examination  Heart : regular rate and rhythm  Lungs : clear, breathing easily  Condition : stable    [facility] nurses notes reviewed and agreed. Past Medical History:        Diagnosis Date    Allergic rhinitis     GERD (gastroesophageal reflux disease)     HTN (hypertension) 2013    Hyperlipidemia     Lumbar spondylolysis     Multiple myeloma not having achieved remission ([facility] [address] 2022    PONV (postoperative nausea and vomiting)     Tight ring on finger     Vitamin D deficiency        Past Surgical History:           Procedure Laterality Date    HERNIA REPAIR  as infant    Bilaterally    HERNIA REPAIR N/A 2021    ROBOTIC LEFT INGUINAL HERNIA REPAIR WITH MESH performed by Booker Owens MD at [facility]      Dr. Kaur Kymberly EXTRACTION         Allergies:  Patient has no known allergies. Medications:   Home Meds  Current Outpatient Medications on File Prior to Encounter   Medication Sig Dispense Refill    pantoprazole (PROTONIX) 40 MG tablet Take 40 mg by mouth in the morning. loratadine (CLARITIN) 10 MG capsule Take 10 mg by mouth in the morning.      gabapentin (NEURONTIN) 300 MG capsule Take 300 mg by mouth in the morning.       atorvastatin (LIPITOR) 20 MG tablet Take 1 tablet by mouth every evening TAKE 1 TABLET DAILY 90 tablet 0    lisinopril (PRINIVIL;ZESTRIL) 20 MG tablet Take 1 tablet by mouth daily 90 tablet 3    Ascorbic Acid (VITAMIN C) 500 MG CAPS Take 1 capsule by mouth daily      Omega-3 Fatty

## 2022-07-22 NOTE — BRIEF OP NOTE
Patient:  Dayanna Ambrocio   :   1975    The procedure including risks and benefits was discussed at length with the patient (or designated family member) and all questions were answered. Informed consent to proceed with the procedure was given.       PROCEDURE : R IJ trifusion catheter placement without complication    BLOOD LOSS : Minimal  SPECIMENS : None  COMPLICATIONS : None  CONDITION : Stable      Neeraj Llamas MD

## 2022-07-25 ENCOUNTER — CLINICAL DOCUMENTATION (OUTPATIENT)
Dept: ONCOLOGY | Age: 47
End: 2022-07-25

## 2022-07-25 ENCOUNTER — HOSPITAL ENCOUNTER (OUTPATIENT)
Dept: ONCOLOGY | Age: 47
Setting detail: INFUSION SERIES
Discharge: HOME | End: 2022-07-25
Payer: COMMERCIAL

## 2022-07-25 PROCEDURE — 6360000002 HC RX W HCPCS: Performed by: NURSE PRACTITIONER

## 2022-07-25 PROCEDURE — 96372 THER/PROPH/DIAG INJ SC/IM: CPT

## 2022-07-25 RX ADMIN — PLERIXAFOR 24 MG: 24 SOLUTION SUBCUTANEOUS at 16:28

## 2022-07-25 NOTE — PROGRESS NOTES
Pt seen and assessed [address] today for mozobil injection per orders from Dr. Zane Hester. Pt tolerated infusion well and without incident. Pt verbalizes understanding of discharge instructions. Discharged ambulatory to home with self.

## 2022-07-26 ENCOUNTER — HOSPITAL ENCOUNTER (OUTPATIENT)
Dept: ONCOLOGY | Age: 47
Setting detail: INFUSION SERIES
Discharge: HOME | End: 2022-07-26
Payer: COMMERCIAL

## 2022-07-26 VITALS
RESPIRATION RATE: 18 BRPM | TEMPERATURE: 98 F | OXYGEN SATURATION: 98 % | SYSTOLIC BLOOD PRESSURE: 112 MMHG | DIASTOLIC BLOOD PRESSURE: 63 MMHG | HEART RATE: 95 BPM

## 2022-07-26 DIAGNOSIS — C90.00 MULTIPLE MYELOMA NOT HAVING ACHIEVED REMISSION (HCC): ICD-10-CM

## 2022-07-26 DIAGNOSIS — Z52.011 AUTOLOGOUS DONOR OF STEM CELLS: Primary | ICD-10-CM

## 2022-07-26 LAB
A/G RATIO: 2.7 (ref 1.1–2.2)
ALBUMIN SERPL-MCNC: 4.8 G/DL (ref 3.4–5)
ALP BLD-CCNC: 408 U/L (ref 40–129)
ALT SERPL-CCNC: 10 U/L (ref 10–40)
ANION GAP SERPL CALCULATED.3IONS-SCNC: 10 MMOL/L (ref 3–16)
AST SERPL-CCNC: 18 U/L (ref 15–37)
BASOPHILS ABSOLUTE: 0 K/UL (ref 0–0.2)
BASOPHILS ABSOLUTE: 0 K/UL (ref 0–0.2)
BASOPHILS RELATIVE PERCENT: 0 %
BASOPHILS RELATIVE PERCENT: 0.1 %
BILIRUB SERPL-MCNC: 0.4 MG/DL (ref 0–1)
BUN BLDV-MCNC: 10 MG/DL (ref 7–20)
CALCIUM SERPL-MCNC: 9.5 MG/DL (ref 8.3–10.6)
CHLORIDE BLD-SCNC: 99 MMOL/L (ref 99–110)
CO2: 31 MMOL/L (ref 21–32)
CREAT SERPL-MCNC: 0.8 MG/DL (ref 0.9–1.3)
EOSINOPHILS ABSOLUTE: 0 K/UL (ref 0–0.6)
EOSINOPHILS ABSOLUTE: 0.5 K/UL (ref 0–0.6)
EOSINOPHILS RELATIVE PERCENT: 0 %
EOSINOPHILS RELATIVE PERCENT: 1 %
GFR AFRICAN AMERICAN: >60
GFR NON-AFRICAN AMERICAN: >60
GLUCOSE BLD-MCNC: 100 MG/DL (ref 70–99)
HCT VFR BLD CALC: 38.5 % (ref 40.5–52.5)
HCT VFR BLD CALC: 42.4 % (ref 40.5–52.5)
HEMOGLOBIN: 13 G/DL (ref 13.5–17.5)
HEMOGLOBIN: 14.1 G/DL (ref 13.5–17.5)
LYMPHOCYTES ABSOLUTE: 0.7 K/UL (ref 1–5.1)
LYMPHOCYTES ABSOLUTE: 2.7 K/UL (ref 1–5.1)
LYMPHOCYTES RELATIVE PERCENT: 1.6 %
LYMPHOCYTES RELATIVE PERCENT: 5 %
MAGNESIUM: 1.9 MG/DL (ref 1.8–2.4)
MCH RBC QN AUTO: 31.3 PG (ref 26–34)
MCH RBC QN AUTO: 31.6 PG (ref 26–34)
MCHC RBC AUTO-ENTMCNC: 33.4 G/DL (ref 31–36)
MCHC RBC AUTO-ENTMCNC: 33.9 G/DL (ref 31–36)
MCV RBC AUTO: 93.2 FL (ref 80–100)
MCV RBC AUTO: 93.8 FL (ref 80–100)
METAMYELOCYTES RELATIVE PERCENT: 2 %
MONOCYTES ABSOLUTE: 1.6 K/UL (ref 0–1.3)
MONOCYTES ABSOLUTE: 6.4 K/UL (ref 0–1.3)
MONOCYTES RELATIVE PERCENT: 12 %
MONOCYTES RELATIVE PERCENT: 3.6 %
NEUTROPHILS ABSOLUTE: 42.5 K/UL (ref 1.7–7.7)
NEUTROPHILS ABSOLUTE: 44.2 K/UL (ref 1.7–7.7)
NEUTROPHILS RELATIVE PERCENT: 81 %
NEUTROPHILS RELATIVE PERCENT: 93.7 %
OVALOCYTES: ABNORMAL
PDW BLD-RTO: 13.3 % (ref 12.4–15.4)
PDW BLD-RTO: 13.3 % (ref 12.4–15.4)
PLATELET # BLD: 118 K/UL (ref 135–450)
PLATELET # BLD: 45 K/UL (ref 135–450)
PLATELET SLIDE REVIEW: ABNORMAL
PMV BLD AUTO: 7.1 FL (ref 5–10.5)
PMV BLD AUTO: 8.8 FL (ref 5–10.5)
POTASSIUM SERPL-SCNC: 3.5 MMOL/L (ref 3.5–5.1)
RBC # BLD: 4.13 M/UL (ref 4.2–5.9)
RBC # BLD: 4.52 M/UL (ref 4.2–5.9)
SCHISTOCYTES: ABNORMAL
SLIDE REVIEW: ABNORMAL
SODIUM BLD-SCNC: 140 MMOL/L (ref 136–145)
TOTAL PROTEIN: 6.6 G/DL (ref 6.4–8.2)
WBC # BLD: 45.3 K/UL (ref 4–11)
WBC # BLD: 53.2 K/UL (ref 4–11)

## 2022-07-26 PROCEDURE — 85025 COMPLETE CBC W/AUTO DIFF WBC: CPT

## 2022-07-26 PROCEDURE — 36592 COLLECT BLOOD FROM PICC: CPT

## 2022-07-26 PROCEDURE — 38206 HARVEST AUTO STEM CELLS: CPT

## 2022-07-26 PROCEDURE — 38207 CRYOPRESERVE STEM CELLS: CPT

## 2022-07-26 PROCEDURE — 6360000002 HC RX W HCPCS: Performed by: INTERNAL MEDICINE

## 2022-07-26 PROCEDURE — 80053 COMPREHEN METABOLIC PANEL: CPT

## 2022-07-26 PROCEDURE — 6370000000 HC RX 637 (ALT 250 FOR IP): Performed by: NURSE PRACTITIONER

## 2022-07-26 PROCEDURE — 83735 ASSAY OF MAGNESIUM: CPT

## 2022-07-26 RX ORDER — PROCHLORPERAZINE MALEATE 10 MG
10 TABLET ORAL EVERY 6 HOURS PRN
Status: CANCELLED | OUTPATIENT
Start: 2022-07-27

## 2022-07-26 RX ORDER — LOPERAMIDE HYDROCHLORIDE 2 MG/1
2 CAPSULE ORAL PRN
Status: CANCELLED | OUTPATIENT
Start: 2022-07-27

## 2022-07-26 RX ORDER — LOPERAMIDE HYDROCHLORIDE 2 MG/1
2 CAPSULE ORAL PRN
Status: DISCONTINUED | OUTPATIENT
Start: 2022-07-26 | End: 2022-07-27 | Stop reason: HOSPADM

## 2022-07-26 RX ORDER — CALCIUM GLUCONATE 20 MG/ML
2000 INJECTION, SOLUTION INTRAVENOUS
Status: DISPENSED | OUTPATIENT
Start: 2022-07-26 | End: 2022-07-26

## 2022-07-26 RX ORDER — ONDANSETRON 4 MG/1
8 TABLET, FILM COATED ORAL EVERY 8 HOURS PRN
Status: DISCONTINUED | OUTPATIENT
Start: 2022-07-26 | End: 2022-07-27 | Stop reason: HOSPADM

## 2022-07-26 RX ORDER — PROCHLORPERAZINE MALEATE 10 MG
10 TABLET ORAL EVERY 6 HOURS PRN
Status: DISCONTINUED | OUTPATIENT
Start: 2022-07-26 | End: 2022-07-27 | Stop reason: HOSPADM

## 2022-07-26 RX ADMIN — CALCIUM GLUCONATE 2000 MG: 20 INJECTION, SOLUTION INTRAVENOUS at 07:09

## 2022-07-26 RX ADMIN — ONDANSETRON HYDROCHLORIDE 8 MG: 4 TABLET, FILM COATED ORAL at 07:05

## 2022-07-26 RX ADMIN — FILGRASTIM-AAFI 780 MCG: 300 INJECTION, SOLUTION SUBCUTANEOUS at 07:05

## 2022-07-26 NOTE — PROGRESS NOTES
Pt reports nausea overnight and this morning. Notified NP Edwina Maravilla. Ordered and administered PO Zofran. Will continue to monitor.

## 2022-07-26 NOTE — PROGRESS NOTES
Pt arrived to [facility] today for scheduled stem cell pheresis procedure. Labs drawn and Granix administered per this RN. Apheresis and education completed by [facility]. Review [facility] documentation for details. Patient discharged home with self. Verbalizes understanding of follow up appointments.      Guy Felix RN

## 2022-07-26 NOTE — H&P
[facility] History and Physical        Attending Physician: No att. providers found    Primary Care: Soren Savaedra MD       Referring MD: MD [facility],  [place] [address]    Name: Samantha Charles :  1975  MRN:  [id number]    Admission: 2022      Date: 2022    Autologous stem cell collection    Dx: IgG Lambda Multiple Multiple     Indication: Anticipation of stem cell transplant     Day + 1 of stem cell collection with neupogen & mozobil      O : Vital Signs reviewed:     /63   Pulse 95   Temp 98 °F (36.7 °C) (Oral)   Resp 18   SpO2 98% . History of Present Illness: This patient was seen for routine annual follow-up by Dr. Kathleen Parada on 2/15/2022. Abnormal labs were noted and her was referred for evaluation. He was seen for initial hematologic consultation on 3/02/2022. The workup showed:  A. WBC 3.2 K/mcL, HGB 12.0 g/dL, HCT 35.5%,  K/mcL, ANC 1.75 K/mcl, MCV 99.2 fL. Myeloma labs showed SPEP showed: M2 (IgG lambda in mid-gamma) 6.6 g/dL. M1 was a free lambda light chain in slow beta that was TMTQ, Kappa 0.412 mg/dL, lambda 68.236 mg/dL, ratio 0.01, 24-hr urine showed total prot 852 mg/24 hrs. M1 was 1.3 gm of free monoclonal lambda in 24-hrs. Bone marrow biopsy, 3/10/2022, showed 70% cellularity with plasma cells estimated to account for between 30-50%. 46,XY[20]. Myeloma ROBIN panel showed three copies of CCND1 (11q13. 3) in 83% of cells, four copies of CCND1 (11q13. 3) in 5% of cells, three copies of MAF (16q23.2) in 54% of cells, and monosomy 13 in 9% of cells. PET/CT, 3/09/2022, showed no evidence of hypermetabolism. He started RVd on 2022. He has completed 4 cycles of treatment. Review of urine and protein studies from  shows VGPR. He now presents to OP Infusion for Day + 1 of stem cell collection. He feels well today, some nausea from Mozobil injection but Zofran relieves.  He denies fever, Cholesterol Mother     Depression Mother     Anxiety Disorder Mother     High Cholesterol Father     Prostate Cancer Father 77        s/p prostatectomy, no other treatment needed    Other Maternal Grandfather         Clotting disorder    Crohn's Disease Other         Social History     Socioeconomic History    Marital status:      Spouse name: Not on file    Number of children: Not on file    Years of education: Not on file    Highest education level: Not on file   Occupational History    Not on file   Tobacco Use    Smoking status: Never    Smokeless tobacco: Never   Vaping Use    Vaping Use: Never used   Substance and Sexual Activity    Alcohol use: Yes     Comment: socially    Drug use: No    Sexual activity: Yes     Partners: Female     Comment: single partner (wife)   Other Topics Concern    Not on file   Social History Narrative    Not on file     Social Determinants of Health     Financial Resource Strain: Not on file   Food Insecurity: Not on file   Transportation Needs: Not on file   Physical Activity: Not on file   Stress: Not on file   Social Connections: Not on file   Intimate Partner Violence: Not on file   Housing Stability: Not on file        ROS:  As noted above, otherwise remainder of 10-point ROS negative      Physical Exam:     Vital Signs:  /63   Pulse 95   Temp 98 °F (36.7 °C) (Oral)   Resp 18   SpO2 98%     Weight:    Wt Readings from Last 3 Encounters:   07/22/22 136 lb (61.7 kg)   07/06/22 145 lb 7 oz (66 kg)   02/14/22 141 lb (64 kg)       KPS: 80% Normal activity with effort; some signs or symptoms of disease    ECOG PS:  (1) Restricted in physically strenuous activity, ambulatory and able to do work of light nature    PE performed by Dr. Cooper Pouch:  General: Awake, alert and oriented.   HEENT: normocephalic, alopecia, PERRL, no scleral erythema or icterus, Oral mucosa moist and intact, throat clear  NECK: supple without palpable adenopathy  BACK: Straight negative CVAT  SKIN: warm dry and intact without lesions rashes or masses  CHEST: CTA bilaterally without use of accessory muscles  CV: Normal S1 S2, RRR, no MRG  ABD: NT ND normoactive BS, no palpable masses or hepatosplenomegaly  EXTREMITIES: without edema, denies calf tenderness  NEURO: CN II - XII grossly intact  CATHETER: Right Subclavian Trifusion: CDI    Laboratory Data:  CBC:   Recent Labs     07/26/22  0718 07/26/22  1305   WBC 53.2* 45.3*   HGB 14.1 13.0*   HCT 42.4 38.5*   MCV 93.8 93.2   * 45*     BMP/Mag:  Recent Labs     07/26/22 0718      K 3.5   CL 99   CO2 31   BUN 10   CREATININE 0.8*   MG 1.90     LIVP:   Recent Labs     07/26/22 0718   AST 18   ALT 10   BILITOT 0.4   ALKPHOS 408*     Coags: No results for input(s): PROTIME, INR, APTT in the last 72 hours. Uric Acid No results for input(s): LABURIC in the last 72 hours. PROBLEM LIST:            Multiple Myeloma      TREATMENT:            RVd on (4/11/22) x 4 cycles     ASSESSMENT AND PLAN:            1. IgG lambda myeloma, ISS stage II:    Description of procedure:   Pt underwent a 20lt collection at a flow rate of 20mls/min. Pt tolerated the procedure well with no complications. He received Ca gluconate empirically. Access was via Right Subclavian Trifusion cath which is intact at the insertion site. Day + 1 stem cell collection with neupogen/mozobil, goal is 5 mil cd 34+/kg. He will be notified of the cell number later this evening. Toxicities: mild weakness and fatigue       2. ID:  Afebrile, no evidence of infection. - Zoster prophylaxis - Acyclovir 400 mg P BID  - Start Diflucan prophylaxis on Day 0   - Start Levaquin when [address] < 1.5    3. Heme:  Blood counts stable  - Transfuse for Hgb < 7 and Platelets < 20N  - No transfusion today    4. Metabolic:  Electrolytes are WNL and renal fxn stable. - Start IVF hydration   - Replace potassium and magnesium per policy. 5. Nutrition:  Appetite and oral intake is good.    - Start low microbial diet   - Follow closely with dietary    6. DVT prophylaxis -  mg    7. Prevention of SRE:    - PET/CT was without bony disease. Can hold of on a bisphosphonate. - Disposition:  He will be notified of cell number later this evening. The patient was seen and examined by Dr. Rik Jiménez. This admission history and physical has been discussed and agreed upon by Dr. Rik Jiménez.     BRINDA Hernandes - NP

## 2022-07-27 ENCOUNTER — HOSPITAL ENCOUNTER (OUTPATIENT)
Dept: ONCOLOGY | Age: 47
Setting detail: INFUSION SERIES
Discharge: HOME | End: 2022-07-27
Payer: COMMERCIAL

## 2022-07-27 DIAGNOSIS — C90.00 MULTIPLE MYELOMA NOT HAVING ACHIEVED REMISSION (HCC): Primary | ICD-10-CM

## 2022-07-27 PROCEDURE — 6360000002 HC RX W HCPCS: Performed by: NURSE PRACTITIONER

## 2022-07-27 PROCEDURE — 99211 OFF/OP EST MAY X REQ PHY/QHP: CPT

## 2022-07-27 RX ORDER — SODIUM CHLORIDE 9 MG/ML
25 INJECTION, SOLUTION INTRAVENOUS PRN
OUTPATIENT
Start: 2022-07-27

## 2022-07-27 RX ORDER — SODIUM CHLORIDE 9 MG/ML
25 INJECTION, SOLUTION INTRAVENOUS PRN
Status: DISCONTINUED | OUTPATIENT
Start: 2022-07-27 | End: 2022-07-28 | Stop reason: HOSPADM

## 2022-07-27 RX ORDER — HEPARIN SODIUM (PORCINE) LOCK FLUSH IV SOLN 100 UNIT/ML 100 UNIT/ML
500 SOLUTION INTRAVENOUS PRN
OUTPATIENT
Start: 2022-07-27

## 2022-07-27 RX ORDER — SODIUM CHLORIDE 0.9 % (FLUSH) 0.9 %
5-40 SYRINGE (ML) INJECTION PRN
Status: DISCONTINUED | OUTPATIENT
Start: 2022-07-27 | End: 2022-07-28 | Stop reason: HOSPADM

## 2022-07-27 RX ORDER — HEPARIN SODIUM (PORCINE) LOCK FLUSH IV SOLN 100 UNIT/ML 100 UNIT/ML
500 SOLUTION INTRAVENOUS PRN
Status: DISCONTINUED | OUTPATIENT
Start: 2022-07-27 | End: 2022-07-28 | Stop reason: HOSPADM

## 2022-07-27 RX ORDER — SODIUM CHLORIDE 0.9 % (FLUSH) 0.9 %
5-40 SYRINGE (ML) INJECTION PRN
OUTPATIENT
Start: 2022-07-27

## 2022-07-27 RX ADMIN — SODIUM CHLORIDE, PRESERVATIVE FREE 500 UNITS: 5 INJECTION INTRAVENOUS at 08:30

## 2022-07-27 NOTE — PROGRESS NOTES
Castillo Antonioow here for high dose heparin removal and cap change. Line care completed and all 3 lumens closed with low dose heparin. Patient departed stable and ambulatory to home.     Mago Reeves RN

## 2022-07-28 RX ORDER — ONDANSETRON HYDROCHLORIDE 8 MG/1
24 TABLET, FILM COATED ORAL ONCE
Status: CANCELLED | OUTPATIENT
Start: 2022-08-03

## 2022-07-28 RX ORDER — DEXAMETHASONE 4 MG/1
12 TABLET ORAL ONCE
Status: CANCELLED | OUTPATIENT
Start: 2022-08-03

## 2022-07-28 RX ORDER — 0.9 % SODIUM CHLORIDE 0.9 %
1000 INTRAVENOUS SOLUTION INTRAVENOUS ONCE
Status: CANCELLED | OUTPATIENT
Start: 2022-08-03

## 2022-08-01 ENCOUNTER — OFFICE VISIT (OUTPATIENT)
Dept: INTERNAL MEDICINE CLINIC | Age: 47
End: 2022-08-01
Payer: COMMERCIAL

## 2022-08-01 ENCOUNTER — TELEPHONE (OUTPATIENT)
Dept: INTERNAL MEDICINE CLINIC | Age: 47
End: 2022-08-01

## 2022-08-01 ENCOUNTER — HOSPITAL ENCOUNTER (OUTPATIENT)
Age: 47
Discharge: HOME | End: 2022-08-01
Payer: COMMERCIAL

## 2022-08-01 VITALS
WEIGHT: 140 LBS | HEART RATE: 100 BPM | SYSTOLIC BLOOD PRESSURE: 122 MMHG | DIASTOLIC BLOOD PRESSURE: 76 MMHG | TEMPERATURE: 97.1 F | BODY MASS INDEX: 22.6 KG/M2 | OXYGEN SATURATION: 97 %

## 2022-08-01 DIAGNOSIS — K21.9 GASTROESOPHAGEAL REFLUX DISEASE, UNSPECIFIED WHETHER ESOPHAGITIS PRESENT: Primary | ICD-10-CM

## 2022-08-01 DIAGNOSIS — K21.9 GASTROESOPHAGEAL REFLUX DISEASE, UNSPECIFIED WHETHER ESOPHAGITIS PRESENT: ICD-10-CM

## 2022-08-01 DIAGNOSIS — K21.9 GASTROESOPHAGEAL REFLUX DISEASE WITHOUT ESOPHAGITIS: Primary | ICD-10-CM

## 2022-08-01 PROCEDURE — 87338 HPYLORI STOOL AG IA: CPT

## 2022-08-01 PROCEDURE — 83013 H PYLORI (C-13) BREATH: CPT

## 2022-08-01 PROCEDURE — 99213 OFFICE O/P EST LOW 20 MIN: CPT | Performed by: NURSE PRACTITIONER

## 2022-08-01 RX ORDER — 0.9 % SODIUM CHLORIDE 0.9 %
1000 INTRAVENOUS SOLUTION INTRAVENOUS ONCE
Status: CANCELLED | OUTPATIENT
Start: 2022-08-03

## 2022-08-01 SDOH — ECONOMIC STABILITY: FOOD INSECURITY: WITHIN THE PAST 12 MONTHS, YOU WORRIED THAT YOUR FOOD WOULD RUN OUT BEFORE YOU GOT MONEY TO BUY MORE.: NEVER TRUE

## 2022-08-01 SDOH — ECONOMIC STABILITY: FOOD INSECURITY: WITHIN THE PAST 12 MONTHS, THE FOOD YOU BOUGHT JUST DIDN'T LAST AND YOU DIDN'T HAVE MONEY TO GET MORE.: NEVER TRUE

## 2022-08-01 ASSESSMENT — ENCOUNTER SYMPTOMS
SORE THROAT: 0
DIARRHEA: 0
COUGH: 1
SHORTNESS OF BREATH: 0
GLOBUS SENSATION: 1
CONSTIPATION: 0
HOARSE VOICE: 0
STRIDOR: 0
CHOKING: 0
WHEEZING: 0
SINUS PAIN: 0
NAUSEA: 1
HEARTBURN: 1
CHEST TIGHTNESS: 0
BELCHING: 1
ABDOMINAL PAIN: 0
VOMITING: 0

## 2022-08-01 ASSESSMENT — SOCIAL DETERMINANTS OF HEALTH (SDOH): HOW HARD IS IT FOR YOU TO PAY FOR THE VERY BASICS LIKE FOOD, HOUSING, MEDICAL CARE, AND HEATING?: NOT HARD AT ALL

## 2022-08-01 NOTE — PROGRESS NOTES
[address]  [address] [facility]  [address]  Tel:[phone]    Esther Vanessa is a 48 y.o. male who presents today for his medical conditions/complaints as noted below. Esther Vanessa is c/o of Gastroesophageal Reflux and Other (Not sleeping well. )      Chief Complaint   Patient presents with    Gastroesophageal Reflux    Other     Not sleeping well. HPI:     Gastroesophageal Reflux  He complains of belching, coughing, dysphagia, globus sensation, heartburn and nausea. He reports no abdominal pain, no chest pain, no choking, no early satiety, no hoarse voice, no sore throat, no stridor or no wheezing. This is a new problem. Episode onset: Began with chemo tx for MM. The problem has been gradually worsening. The heartburn duration is more than one hour. The heartburn is located in the substernum and abdomen. The heartburn is of severe intensity. The heartburn wakes him from sleep. The heartburn limits his activity. The heartburn doesn't change with position. Exacerbated by: Active nearly constantly. Associated symptoms include fatigue (2/2 poor sleep). Pertinent negatives include no anemia, melena, muscle weakness or orthopnea. He has tried an antacid, a diet change, a PPI and head elevation for the symptoms. The treatment provided no relief. Past procedures include H. pylori antibody titer (Previous positive H. Pylori test).      Past Medical History:   Diagnosis Date    Allergic rhinitis     GERD (gastroesophageal reflux disease)     HTN (hypertension) 9/26/2013    Hyperlipidemia     Lumbar spondylolysis     Multiple myeloma not having achieved remission ([facility] [address] 4/12/2022    PONV (postoperative nausea and vomiting)     Tight ring on finger     Vitamin D deficiency         Past Surgical History:   Procedure Laterality Date    HERNIA REPAIR  as infant    Bilaterally    HERNIA REPAIR N/A 6/23/2021    ROBOTIC LEFT INGUINAL HERNIA REPAIR WITH MESH performed by Pat Coulter MD at [facility]    IR TUNNELED [address] 5 YEARS  7/22/2022    IR TUNNELED CATHETER PLACEMENT GREATER THAN 5 YEARS 7/22/2022 Sage Curry  2010    Dr. Kristin Chu EXTRACTION         Family History   Problem Relation Age of Onset    Hypertension Mother     Stroke Mother     High Cholesterol Mother     Depression Mother     Anxiety Disorder Mother     High Cholesterol Father     Prostate Cancer Father 77        s/p prostatectomy, no other treatment needed    Other Maternal Grandfather         Clotting disorder    Crohn's Disease Other        Social History     Tobacco Use    Smoking status: Never    Smokeless tobacco: Never   Substance Use Topics    Alcohol use: Yes     Comment: socially        Current Outpatient Medications   Medication Sig Dispense Refill    pantoprazole (PROTONIX) 40 MG tablet Take 40 mg by mouth in the morning. loratadine (CLARITIN) 10 MG capsule Take 10 mg by mouth in the morning.      gabapentin (NEURONTIN) 300 MG capsule Take 300 mg by mouth in the morning. atorvastatin (LIPITOR) 20 MG tablet Take 1 tablet by mouth every evening TAKE 1 TABLET DAILY 90 tablet 0    lisinopril (PRINIVIL;ZESTRIL) 20 MG tablet Take 1 tablet by mouth daily 90 tablet 3    Ascorbic Acid (VITAMIN C) 500 MG CAPS Take 1 capsule by mouth daily      Cholecalciferol (VITAMIN D PO) Take 2,000 Int'l Units by mouth daily        No current facility-administered medications for this visit. No Known Allergies    Subjective:      Review of Systems   Constitutional:  Positive for fatigue (2/2 poor sleep). Negative for fever. HENT:  Negative for hoarse voice, sinus pain and sore throat. Respiratory:  Positive for cough. Negative for choking, chest tightness, shortness of breath and wheezing. Cardiovascular:  Negative for chest pain and palpitations.    Gastrointestinal:  Positive for dysphagia, heartburn and nausea. Negative for abdominal pain, constipation, diarrhea, melena and vomiting. Heartburn, reflux   Genitourinary:  Negative for dysuria and urgency. Musculoskeletal:  Negative for muscle weakness. Skin:  Negative for rash. Neurological:  Negative for dizziness and weakness. Objective:     Vitals:    08/01/22 1142   BP: 122/76   Site: Right Upper Arm   Position: Sitting   Cuff Size: Medium Adult   Pulse: 100   Temp: 97.1 °F (36.2 °C)   TempSrc: Temporal   SpO2: 97%   Weight: 140 lb (63.5 kg)       Physical Exam  Vitals and nursing note reviewed. Constitutional:       Appearance: Normal appearance. He is well-developed. HENT:      Head: Normocephalic and atraumatic. Right Ear: Hearing and external ear normal.      Left Ear: Hearing and external ear normal.      Nose: Nose normal.   Eyes:      General: Lids are normal.      Pupils: Pupils are equal, round, and reactive to light. Cardiovascular:      Rate and Rhythm: Normal rate. Pulses: Normal pulses. Pulmonary:      Effort: Pulmonary effort is normal. No accessory muscle usage or respiratory distress. Abdominal:      General: Bowel sounds are normal.      Palpations: Abdomen is soft. Musculoskeletal:      Cervical back: Normal range of motion. Skin:     General: Skin is warm and dry. Neurological:      Mental Status: He is alert. Psychiatric:         Speech: Speech normal.       Assessment & Plan: The following diagnoses and conditions are stable with no further orders unless indicated:    1. Gastroesophageal reflux disease without esophagitis        Tyshawn was seen today for gastroesophageal reflux and other. Diagnoses and all orders for this visit:    Gastroesophageal reflux disease without esophagitis  -     H. Pylori Breath Test, Adult; Future  -     Cancel: Helicobacter pylori antibodies IgA & IgG  -     Cancel: Helicobacter pylori antibodies IgA & IgG  Screen h.  Pylori as GERD symptoms continually worsening. Continue maxmium PPI. Consider addition of pepcid. Trial of gaviscon as tums has not been effective. GERD diet: avoid fried and fatty foods. peppermint, chocolate, alcohol, coffee, citrus fruits and juices, tomato products; avoid lying down for 2 to 3 hours after eating, avoid tight fitting clothing. Return if symptoms worsen or fail to improve. Patientshould call the office immediately with new or ongoing signs or symptoms or worsening, or proceed to the emergency room. If you are on medications which could impair your senses, you are at risk of weakness, falls,dizziness, or drowsiness. You should be careful during activities which could place you at risk of harm, such as climbing, using stairs, operating machinery, or driving vehicles. If you feel you cannot safely do theseactivities, you should request others to help you, or avoid the activities altogether. If you are drowsy for any other reason, you should use the same precautions as listed above. Call if pattern of symptoms change or persists for an extended time.       Blanca Chen

## 2022-08-01 NOTE — TELEPHONE ENCOUNTER
[facility] lab calling stating that the patient got the breath test and the lab draw done today but is now asking for the stool tests to be ran. They are Helicobacter pylori Antigen, EIA. The patient os worried about the accuracy of the tests because of him being on the PPI inhibiter's.

## 2022-08-03 ENCOUNTER — TELEPHONE (OUTPATIENT)
Dept: INTERNAL MEDICINE CLINIC | Age: 47
End: 2022-08-03

## 2022-08-03 DIAGNOSIS — A04.8 H. PYLORI INFECTION: Primary | ICD-10-CM

## 2022-08-03 LAB
H PYLORI ANTIGEN STOOL: NEGATIVE
H PYLORI BREATH TEST: POSITIVE

## 2022-08-03 RX ORDER — PANTOPRAZOLE SODIUM 40 MG/1
40 TABLET, DELAYED RELEASE ORAL 2 TIMES DAILY
Qty: 60 TABLET | Refills: 0 | Status: SHIPPED | OUTPATIENT
Start: 2022-08-03 | End: 2022-10-04

## 2022-08-03 NOTE — TELEPHONE ENCOUNTER
Quadruple therapy sent to pharmacy. He should begin this ASAP which should help to reduce his symptoms.      If no improvement, would suggest follow up with GI

## 2022-08-05 LAB
HELICOBACTER PYLORI IGA: <9 UNITS (ref 0–8.9)
HELICOBACTER PYLORI IGG: 0.1 INDEX VALUE (ref 0–0.79)
Lab: 15.5 UNITS (ref 0–8.9)
Lab: NORMAL

## 2022-08-09 ENCOUNTER — HOSPITAL ENCOUNTER (EMERGENCY)
Age: 47
Discharge: HOME | End: 2022-08-10
Attending: EMERGENCY MEDICINE
Payer: COMMERCIAL

## 2022-08-09 VITALS
HEIGHT: 66 IN | WEIGHT: 135 LBS | HEART RATE: 75 BPM | DIASTOLIC BLOOD PRESSURE: 90 MMHG | OXYGEN SATURATION: 97 % | BODY MASS INDEX: 21.69 KG/M2 | TEMPERATURE: 98.2 F | SYSTOLIC BLOOD PRESSURE: 136 MMHG | RESPIRATION RATE: 18 BRPM

## 2022-08-09 DIAGNOSIS — R10.9 ABDOMINAL PAIN, UNSPECIFIED ABDOMINAL LOCATION: Primary | ICD-10-CM

## 2022-08-09 DIAGNOSIS — Z86.19 HISTORY OF HELICOBACTER PYLORI INFECTION: ICD-10-CM

## 2022-08-09 PROCEDURE — 99284 EMERGENCY DEPT VISIT MOD MDM: CPT

## 2022-08-10 LAB
ALBUMIN SERPL-MCNC: 4.5 G/DL (ref 3.4–5)
ALP BLD-CCNC: 85 U/L (ref 40–129)
ALT SERPL-CCNC: 12 U/L (ref 10–40)
ANION GAP SERPL CALCULATED.3IONS-SCNC: 13 MMOL/L (ref 3–16)
AST SERPL-CCNC: 19 U/L (ref 15–37)
BASOPHILS ABSOLUTE: 0 K/UL (ref 0–0.2)
BASOPHILS RELATIVE PERCENT: 0.6 %
BILIRUB SERPL-MCNC: 0.4 MG/DL (ref 0–1)
BILIRUBIN DIRECT: <0.2 MG/DL (ref 0–0.3)
BILIRUBIN, INDIRECT: ABNORMAL MG/DL (ref 0–1)
BUN BLDV-MCNC: 10 MG/DL (ref 7–20)
CALCIUM SERPL-MCNC: 9 MG/DL (ref 8.3–10.6)
CHLORIDE BLD-SCNC: 101 MMOL/L (ref 99–110)
CO2: 23 MMOL/L (ref 21–32)
CREAT SERPL-MCNC: 0.7 MG/DL (ref 0.9–1.3)
EOSINOPHILS ABSOLUTE: 0.1 K/UL (ref 0–0.6)
EOSINOPHILS RELATIVE PERCENT: 1.6 %
GFR AFRICAN AMERICAN: >60
GFR NON-AFRICAN AMERICAN: >60
GLUCOSE BLD-MCNC: 106 MG/DL (ref 70–99)
HCT VFR BLD CALC: 40.1 % (ref 40.5–52.5)
HEMOGLOBIN: 13.6 G/DL (ref 13.5–17.5)
LIPASE: 49 U/L (ref 13–60)
LYMPHOCYTES ABSOLUTE: 0.5 K/UL (ref 1–5.1)
LYMPHOCYTES RELATIVE PERCENT: 11.3 %
MCH RBC QN AUTO: 31.7 PG (ref 26–34)
MCHC RBC AUTO-ENTMCNC: 34 G/DL (ref 31–36)
MCV RBC AUTO: 93.3 FL (ref 80–100)
MONOCYTES ABSOLUTE: 0.6 K/UL (ref 0–1.3)
MONOCYTES RELATIVE PERCENT: 14.6 %
NEUTROPHILS ABSOLUTE: 2.9 K/UL (ref 1.7–7.7)
NEUTROPHILS RELATIVE PERCENT: 71.9 %
PDW BLD-RTO: 13.2 % (ref 12.4–15.4)
PLATELET # BLD: 188 K/UL (ref 135–450)
PMV BLD AUTO: 8.9 FL (ref 5–10.5)
POTASSIUM SERPL-SCNC: 4.3 MMOL/L (ref 3.5–5.1)
RBC # BLD: 4.29 M/UL (ref 4.2–5.9)
SODIUM BLD-SCNC: 137 MMOL/L (ref 136–145)
TOTAL PROTEIN: 6.2 G/DL (ref 6.4–8.2)
WBC # BLD: 4 K/UL (ref 4–11)

## 2022-08-10 PROCEDURE — A4216 STERILE WATER/SALINE, 10 ML: HCPCS | Performed by: PHYSICIAN ASSISTANT

## 2022-08-10 PROCEDURE — 80048 BASIC METABOLIC PNL TOTAL CA: CPT

## 2022-08-10 PROCEDURE — 80076 HEPATIC FUNCTION PANEL: CPT

## 2022-08-10 PROCEDURE — 85025 COMPLETE CBC W/AUTO DIFF WBC: CPT

## 2022-08-10 PROCEDURE — 83690 ASSAY OF LIPASE: CPT

## 2022-08-10 PROCEDURE — 36415 COLL VENOUS BLD VENIPUNCTURE: CPT

## 2022-08-10 PROCEDURE — 2580000003 HC RX 258: Performed by: PHYSICIAN ASSISTANT

## 2022-08-10 PROCEDURE — 2500000003 HC RX 250 WO HCPCS: Performed by: PHYSICIAN ASSISTANT

## 2022-08-10 PROCEDURE — 6360000002 HC RX W HCPCS: Performed by: PHYSICIAN ASSISTANT

## 2022-08-10 RX ORDER — ONDANSETRON 2 MG/ML
4 INJECTION INTRAMUSCULAR; INTRAVENOUS ONCE
Status: COMPLETED | OUTPATIENT
Start: 2022-08-10 | End: 2022-08-10

## 2022-08-10 RX ADMIN — FAMOTIDINE 20 MG: 10 INJECTION, SOLUTION INTRAVENOUS at 00:49

## 2022-08-10 RX ADMIN — ONDANSETRON 4 MG: 2 INJECTION INTRAMUSCULAR; INTRAVENOUS at 00:49

## 2022-08-10 ASSESSMENT — PAIN DESCRIPTION - ORIENTATION: ORIENTATION: LOWER

## 2022-08-10 ASSESSMENT — PAIN DESCRIPTION - PAIN TYPE: TYPE: ACUTE PAIN

## 2022-08-10 ASSESSMENT — PAIN DESCRIPTION - DESCRIPTORS: DESCRIPTORS: SHARP

## 2022-08-10 ASSESSMENT — PAIN DESCRIPTION - LOCATION: LOCATION: ABDOMEN

## 2022-08-10 ASSESSMENT — PAIN DESCRIPTION - FREQUENCY: FREQUENCY: CONTINUOUS

## 2022-08-10 ASSESSMENT — PAIN SCALES - GENERAL: PAINLEVEL_OUTOF10: 5

## 2022-08-10 NOTE — ED PROVIDER NOTES
[address] ENCOUNTER          PHYSICIAN ASSISTANT NOTE     Date of evaluation: 8/9/2022    Chief Complaint     Abdominal Pain and Nausea      History of Present Illness     Harrison Hall is a 48 y.o. male with a history of IgG Lambda myeloma currently on quadruple therapy for H. Pylori. He presents today for evaluation of persistent abdominal pain that he correlates with his history of H. pylori. He states the pain has been present for several weeks. Described as sharp, deep, present in the middle abdomen. It improves when he eats but worsens when he lies down or fasts. It does not radiate to the back. It is not associate with any ripping/tearing component. He denies any fevers, urinary symptoms, or bowel output changes. His last chemotherapy regimen was approximately 6 weeks ago. He presents given his persistent discomfort this evening that is preventing him from sleeping. He feels that the quadruple therapy, which started yesterday, is slightly helping his symptoms. Review of Systems     A complete review of systems was performed and negative except as stated in the HPI. Past Medical, Surgical, Family, and Social History     He has a past medical history of Allergic rhinitis, GERD (gastroesophageal reflux disease), HTN (hypertension), Hyperlipidemia, Lumbar spondylolysis, Multiple myeloma not having achieved remission ([address] PONV (postoperative nausea and vomiting), Tight ring on finger, and Vitamin D deficiency. He has a past surgical history that includes English tooth extraction; hernia repair (as infant); Vasectomy (2010); hernia repair (N/A, 6/23/2021); and IR TUNNELED CVC PLACE WO SQ PORT/PUMP > 5 YEARS (7/22/2022). His family history includes Anxiety Disorder in his mother; Crohn's Disease in an other family member; Depression in his mother; High Cholesterol in his father and mother; Hypertension in his mother;  Other in his maternal grandfather; Prostate Cancer (age of onset: 77) in his father; Stroke in his mother. He reports that he has never smoked. He has never used smokeless tobacco. He reports current alcohol use. He reports that he does not use drugs. Medications     Previous Medications    ASCORBIC ACID (VITAMIN C) 500 MG CAPS    Take 1 capsule by mouth daily    ATORVASTATIN (LIPITOR) 20 MG TABLET    Take 1 tablet by mouth every evening TAKE 1 TABLET DAILY    BISMUTH-METRONIDAZOLE-TETRACYCLINE (PLYERA) 140-125-125 MG PER CAPSULE    Take 3 capsules by mouth 4 times daily (before meals and nightly) for 10 days    CHOLECALCIFEROL (VITAMIN D PO)    Take 2,000 Int'l Units by mouth daily     GABAPENTIN (NEURONTIN) 300 MG CAPSULE    Take 300 mg by mouth in the morning. LISINOPRIL (PRINIVIL;ZESTRIL) 20 MG TABLET    Take 1 tablet by mouth daily    LORATADINE (CLARITIN) 10 MG CAPSULE    Take 10 mg by mouth in the morning. PANTOPRAZOLE (PROTONIX) 40 MG TABLET    Take 1 tablet by mouth in the morning and 1 tablet in the evening. Allergies     He has No Known Allergies. Physical Exam     INITIAL VITALS: BP: (!) 136/90, Temp: 98.2 °F (36.8 °C), Heart Rate: 75, Resp: 18, SpO2: 97 %     General: Well appearing, well nourished, in no apparent state of distress. HEENT:  Normocephalic, atraumatic. Pupils equal, sclera white. Handling secretions without difficulty. Neck: No meningismus. Trachea midline    Pulmonary: Respirations even. Non labored. No tachypnea. Cardiac: Chest symmetrical and non-tender on palpation of chest wall. Abdomen:  Non-distended. Bowel sounds present in all quadrants. Negative Cruz sign. No palpable organomegaly. Patient points to the central abdomen just above the umbilicus as the source of his discomfort, but he cannot reproduce it when I press. No palpable pulsatile abdominal masses. Musculoskeletal:  Ambulates under own control. Neuro:  Alert and oriented x 3. CN II - XII grossly intact.  Moves all extremities spontaneously. Vascular:  2+ peripheral pulses in bilateral upper and lower extremities      Skin:  Warm and well perfused without rashes or lesions    Psych:  Appropriate mood and affect        Diagnostic Results     EKG       RADIOLOGY:  No orders to display       LABS:   No results found for this visit on 08/09/22. ED BEDSIDE ULTRASOUND:      RECENT VITALS:  BP: (!) 136/90, Temp: 98.2 °F (36.8 °C), Heart Rate: 75, Resp: 18, SpO2: 97 %     Procedures         ED Course     Nursing Notes, Past Medical Hx, Past Surgical Hx, Social Hx, Allergies, and Family Hx were reviewed. The patient was given the following medications:  Orders Placed This Encounter   Medications    ondansetron (ZOFRAN) injection 4 mg    famotidine (PEPCID) 20 mg in sodium chloride (PF) 10 mL injection       CONSULTS:  None    MEDICAL DECISION MAKING / ASSESSMENT / Elijah Nicholas is a 48 y.o. male presents today for persistent abdominal pain in the setting of a now known H. pylori infection for which he is on day 2 of quadruple therapy. The treatment seems to be improving his pain slightly, but given the severity of his discomfort which has prevented him from sleeping he presented here for evaluation. My examination reveals a benign abdomen without reproducible pain and a negative Cruz sign. Symptoms likely indicative of ongoing gastritis versus peptic ulcer disease from H. pylori. Lower suspicion for cholecystitis, cholelithiasis, choledocholithiasis, pancreatitis, AAA, atypical ACS. We will proceed with laboratory evaluation given his underlying myeloma, IV Pepcid, IV Zofran, reevaluate. My attending physician will follow up on his response to therapy and determine the need for further intervention and the appropriate disposition. This patient was also evaluated by the attending physician. All care plans were discussed and agreed upon.     Some of this note was dictated using voice recognition software. As a result, unintended errors in grammar or spelling may exist.    Clinical Impression     1. Abdominal pain, unspecified abdominal location    2. History of Helicobacter pylori infection        Disposition     PATIENT REFERRED TO:  No follow-up provider specified.     DISCHARGE MEDICATIONS:  New Prescriptions    No medications on file       [address] PA-C  08/10/22 [id number]

## 2022-08-10 NOTE — ED PROVIDER NOTES
ED Attending Attestation Note     Date of evaluation: 8/9/2022    This patient was seen by the advance practice provider. I have seen and examined the patient, agree with the workup, evaluation, management and diagnosis. The care plan has been discussed. My assessment reveals complaints of abdominal pain and nausea. Patient states his symptoms been going on for approximately a month. He states he initially thought it was due to his chemotherapy but recently was evaluated for continued symptoms and was found to be positive for H. pylori on a breath test.  He is currently on quadruple antibiotic therapy. He states he is in a visit with his gastroenterologist in less than 24 hours but his pain was worse tonight so came to the hospital.  Patient has a soft abdomen on exam with minimal epigastric tenderness present. Laboratory studies are unremarkable with improvement of his blood counts from his multiple myeloma treatment. Patient was given Pepcid with improvement of his symptoms. I do not feel any imaging is indicated at this time. Patient will be instructed to continue his current medications and follow-up with his gastroenterologist later today as scheduled.        Janiya Esquivel MD  08/10/22 0809

## 2022-08-10 NOTE — DISCHARGE INSTRUCTIONS
Continue your current medications, including the antibiotics that you were written for for the H. pylori infection. Follow-up with your gastroenterologist later today as scheduled.

## 2022-08-11 ENCOUNTER — APPOINTMENT (OUTPATIENT)
Dept: CT IMAGING | Age: 47
End: 2022-08-11
Payer: COMMERCIAL

## 2022-08-11 ENCOUNTER — HOSPITAL ENCOUNTER (EMERGENCY)
Age: 47
Discharge: HOME | End: 2022-08-11
Attending: EMERGENCY MEDICINE
Payer: COMMERCIAL

## 2022-08-11 VITALS
WEIGHT: 140.3 LBS | OXYGEN SATURATION: 99 % | SYSTOLIC BLOOD PRESSURE: 109 MMHG | HEIGHT: 66 IN | HEART RATE: 87 BPM | BODY MASS INDEX: 22.55 KG/M2 | TEMPERATURE: 97.4 F | DIASTOLIC BLOOD PRESSURE: 73 MMHG | RESPIRATION RATE: 14 BRPM

## 2022-08-11 DIAGNOSIS — R10.9 ABDOMINAL PAIN, UNSPECIFIED ABDOMINAL LOCATION: Primary | ICD-10-CM

## 2022-08-11 LAB
A/G RATIO: 2.7 (ref 1.1–2.2)
ALBUMIN SERPL-MCNC: 4.6 G/DL (ref 3.4–5)
ALP BLD-CCNC: 94 U/L (ref 40–129)
ALT SERPL-CCNC: 13 U/L (ref 10–40)
ANION GAP SERPL CALCULATED.3IONS-SCNC: 9 MMOL/L (ref 3–16)
AST SERPL-CCNC: 16 U/L (ref 15–37)
BASOPHILS ABSOLUTE: 0.2 K/UL (ref 0–0.2)
BASOPHILS RELATIVE PERCENT: 4.9 %
BILIRUB SERPL-MCNC: 0.7 MG/DL (ref 0–1)
BILIRUBIN URINE: NEGATIVE
BLOOD, URINE: NEGATIVE
BUN BLDV-MCNC: 9 MG/DL (ref 7–20)
CALCIUM SERPL-MCNC: 9.3 MG/DL (ref 8.3–10.6)
CHLORIDE BLD-SCNC: 99 MMOL/L (ref 99–110)
CLARITY: CLEAR
CO2: 28 MMOL/L (ref 21–32)
COLOR: YELLOW
CREAT SERPL-MCNC: 0.8 MG/DL (ref 0.9–1.3)
EOSINOPHILS ABSOLUTE: 0.1 K/UL (ref 0–0.6)
EOSINOPHILS RELATIVE PERCENT: 1.5 %
GFR AFRICAN AMERICAN: >60
GFR NON-AFRICAN AMERICAN: >60
GLUCOSE BLD-MCNC: 145 MG/DL (ref 70–99)
GLUCOSE URINE: NEGATIVE MG/DL
HCT VFR BLD CALC: 41.1 % (ref 40.5–52.5)
HEMOGLOBIN: 14.4 G/DL (ref 13.5–17.5)
INFLUENZA A: NOT DETECTED
INFLUENZA B: NOT DETECTED
KETONES, URINE: NEGATIVE MG/DL
LACTIC ACID: 1.3 MMOL/L (ref 0.4–2)
LEUKOCYTE ESTERASE, URINE: NEGATIVE
LIPASE: 36 U/L (ref 13–60)
LYMPHOCYTES ABSOLUTE: 0.1 K/UL (ref 1–5.1)
LYMPHOCYTES RELATIVE PERCENT: 3.2 %
MCH RBC QN AUTO: 32.5 PG (ref 26–34)
MCHC RBC AUTO-ENTMCNC: 34.9 G/DL (ref 31–36)
MCV RBC AUTO: 92.9 FL (ref 80–100)
MICROSCOPIC EXAMINATION: NORMAL
MONOCYTES ABSOLUTE: 0.4 K/UL (ref 0–1.3)
MONOCYTES RELATIVE PERCENT: 11 %
NEUTROPHILS ABSOLUTE: 2.9 K/UL (ref 1.7–7.7)
NEUTROPHILS RELATIVE PERCENT: 79.4 %
NITRITE, URINE: NEGATIVE
PDW BLD-RTO: 13.2 % (ref 12.4–15.4)
PH UA: 6.5 (ref 5–8)
PLATELET # BLD: 177 K/UL (ref 135–450)
PMV BLD AUTO: 8.4 FL (ref 5–10.5)
POTASSIUM SERPL-SCNC: 4.5 MMOL/L (ref 3.5–5.1)
PROTEIN UA: NEGATIVE MG/DL
RBC # BLD: 4.43 M/UL (ref 4.2–5.9)
RBC UA: NORMAL /HPF (ref 0–4)
SARS-COV-2 RNA, RT PCR: NOT DETECTED
SODIUM BLD-SCNC: 136 MMOL/L (ref 136–145)
SPECIFIC GRAVITY UA: 1.01 (ref 1–1.03)
TOTAL PROTEIN: 6.3 G/DL (ref 6.4–8.2)
URINE TYPE: NORMAL
UROBILINOGEN, URINE: 0.2 E.U./DL
WBC # BLD: 3.7 K/UL (ref 4–11)
WBC UA: NORMAL /HPF (ref 0–5)

## 2022-08-11 PROCEDURE — 6360000004 HC RX CONTRAST MEDICATION: Performed by: EMERGENCY MEDICINE

## 2022-08-11 PROCEDURE — 83605 ASSAY OF LACTIC ACID: CPT

## 2022-08-11 PROCEDURE — 74177 CT ABD & PELVIS W/CONTRAST: CPT

## 2022-08-11 PROCEDURE — 87636 SARSCOV2 & INF A&B AMP PRB: CPT

## 2022-08-11 PROCEDURE — 6370000000 HC RX 637 (ALT 250 FOR IP): Performed by: EMERGENCY MEDICINE

## 2022-08-11 PROCEDURE — 80053 COMPREHEN METABOLIC PANEL: CPT

## 2022-08-11 PROCEDURE — 96375 TX/PRO/DX INJ NEW DRUG ADDON: CPT

## 2022-08-11 PROCEDURE — 96374 THER/PROPH/DIAG INJ IV PUSH: CPT

## 2022-08-11 PROCEDURE — 81001 URINALYSIS AUTO W/SCOPE: CPT

## 2022-08-11 PROCEDURE — 85025 COMPLETE CBC W/AUTO DIFF WBC: CPT

## 2022-08-11 PROCEDURE — 6360000002 HC RX W HCPCS: Performed by: EMERGENCY MEDICINE

## 2022-08-11 PROCEDURE — 83690 ASSAY OF LIPASE: CPT

## 2022-08-11 PROCEDURE — 99285 EMERGENCY DEPT VISIT HI MDM: CPT

## 2022-08-11 RX ORDER — DICYCLOMINE HCL 20 MG
20 TABLET ORAL ONCE
Status: COMPLETED | OUTPATIENT
Start: 2022-08-11 | End: 2022-08-11

## 2022-08-11 RX ORDER — ONDANSETRON 2 MG/ML
4 INJECTION INTRAMUSCULAR; INTRAVENOUS ONCE
Status: COMPLETED | OUTPATIENT
Start: 2022-08-11 | End: 2022-08-11

## 2022-08-11 RX ORDER — DICYCLOMINE HYDROCHLORIDE 10 MG/1
10 CAPSULE ORAL 4 TIMES DAILY
Qty: 120 CAPSULE | Refills: 3 | OUTPATIENT
Start: 2022-08-11 | End: 2022-08-29

## 2022-08-11 RX ORDER — PROMETHAZINE HYDROCHLORIDE 25 MG/1
25 TABLET ORAL EVERY 6 HOURS PRN
Qty: 20 TABLET | Refills: 0 | Status: SHIPPED | OUTPATIENT
Start: 2022-08-11 | End: 2022-08-15

## 2022-08-11 RX ADMIN — IOPAMIDOL 75 ML: 755 INJECTION, SOLUTION INTRAVENOUS at 10:14

## 2022-08-11 RX ADMIN — HYDROMORPHONE HYDROCHLORIDE 1 MG: 1 INJECTION, SOLUTION INTRAMUSCULAR; INTRAVENOUS; SUBCUTANEOUS at 08:50

## 2022-08-11 RX ADMIN — ONDANSETRON 4 MG: 2 INJECTION INTRAMUSCULAR; INTRAVENOUS at 08:49

## 2022-08-11 RX ADMIN — DICYCLOMINE HYDROCHLORIDE 20 MG: 20 TABLET ORAL at 11:55

## 2022-08-11 ASSESSMENT — PAIN SCALES - GENERAL
PAINLEVEL_OUTOF10: 7
PAINLEVEL_OUTOF10: 0

## 2022-08-11 NOTE — ED PROVIDER NOTES
[facility] ATTENDING NOTE       Date of evaluation: 8/11/2022    Chief Complaint     No chief complaint on file. History of Present Illness     Esther Vanessa is a 48 y.o. male who presents to the emerged part with abdominal pain. Patient has been having crampy epigastric/periumbilical abdominal pain for the last 4 to 6 weeks. His oncology team felt it was likely acid reflux and he had a positive H. pylori test in his PCP office. He is started on quadruple therapy however with minimal improvement. His Protonix was increased and he still had persistent pain as well as nausea. He was seen in our emergency department earlier this week for this complaint with laboratory work-up overall reassuring. He was discharged with plans to follow-up with GI which he did yesterday. His GI physician felt that it is unlikely to be solely H. pylori as a cause given the length of the patient's pain despite appropriate treatment. He is scheduled for an EGD and colonoscopy later this month however patient states that the pain is persistent and so bothersome, 8/10 severity, that he does not think that he will be able to wait that long. He continues to deny any additional symptoms such as fever, cough, shortness of breath or changes in bladder or bowel function. No blood in the toilet. He states that the pain is not related to eating or changes in position. He denies any radiation of the pain to his back, groin or flanks. Review of Systems     Positive: Abdominal pain, nausea    Negative: Fever, cough, shortness of breath, chest pain, vomiting, changes in taste, change in smell, changes in bladder or bowel function  See HPI. A complete review of systems wasconducted and is otherwise negative unless noted above.     Past Medical, Surgical, Family, and Social History     He has a past medical history of Allergic rhinitis, GERD (gastroesophageal reflux disease), HTN (hypertension), Hyperlipidemia, Lumbar spondylolysis, Multiple myeloma not having achieved remission ([address] PONV (postoperative nausea and vomiting), Tight ring on finger, and Vitamin D deficiency. He has a past surgical history that includes [place] tooth extraction; hernia repair (as infant); Vasectomy (2010); hernia repair (N/A, 6/23/2021); and IR TUNNELED CVC PLACE WO SQ PORT/PUMP > 5 YEARS (7/22/2022). His family history includes Anxiety Disorder in his mother; Crohn's Disease in an other family member; Depression in his mother; High Cholesterol in his father and mother; Hypertension in his mother; Other in his maternal grandfather; Prostate Cancer (age of onset: 77) in his father; Stroke in his mother. He reports that he has never smoked. He has never used smokeless tobacco. He reports current alcohol use. He reports that he does not use drugs. Medications     Previous Medications    ASCORBIC ACID (VITAMIN C) 500 MG CAPS    Take 1 capsule by mouth daily    ATORVASTATIN (LIPITOR) 20 MG TABLET    Take 1 tablet by mouth every evening TAKE 1 TABLET DAILY    BISMUTH-METRONIDAZOLE-TETRACYCLINE (PLYERA) 140-125-125 MG PER CAPSULE    Take 3 capsules by mouth 4 times daily (before meals and nightly) for 10 days    CHOLECALCIFEROL (VITAMIN D PO)    Take 2,000 Int'l Units by mouth daily     GABAPENTIN (NEURONTIN) 300 MG CAPSULE    Take 300 mg by mouth in the morning. LISINOPRIL (PRINIVIL;ZESTRIL) 20 MG TABLET    Take 1 tablet by mouth daily    LORATADINE (CLARITIN) 10 MG CAPSULE    Take 10 mg by mouth in the morning. PANTOPRAZOLE (PROTONIX) 40 MG TABLET    Take 1 tablet by mouth in the morning and 1 tablet in the evening. Allergies     He has No Known Allergies.     Physical Exam     INITIAL VITALS: BP: 136/89, Temp: 97.4 °F (36.3 °C), Heart Rate: 87, Resp: 14, SpO2: 100 %   Physical Exam    General:  Well developed adult male in no acute distress, able toconverse in full sentences  HEENT: Normocephalic, atraumatic, PERRL, extra-ocular movements intact, oropharynx benign  Neck:  Supple, no lymphadenopathy, trachea midline, no masses  Pulmonary:   Clear to auscultation bilaterally, good air movement, no wheezes  Cardiac:  Regular rate and rhythm, no M/R/G  Abdomen:  Soft, nondistended, mild tenderness to palpation in the periumbilical region with no rebound or guarding   musculoskeletal:  2+ pulses, no edema or clubbing  Skin:  No concerning rashes or lesions, no cyanosis  Neuro: Alert and oriented X 4,able to move all four extremities with equal strength bilaterally, sensory exam grossly intact, cranial nerves II-XII intact  Psych:  Appropriate mood and affect    Diagnostic Results     EKG       RADIOLOGY:  CT ABDOMEN PELVIS W IV CONTRAST Additional Contrast? None   Final Result      1. No acute intra-abdominopelvic abnormality. LABS:   No results found for this visit on 08/11/22. ED BEDSIDE ULTRASOUND:      RECENT VITALS:  BP: 136/89, Temp: 97.4 °F (36.3 °C), Heart Rate: 87, Resp: 14, SpO2: 100 %     Procedures         ED Course     Nursing Notes, Past Medical Hx, Past Surgical Hx, Social Hx, Allergies, and Family Hx were reviewed. The patient was given the following medications:  No orders of the defined types were placed in this encounter. CONSULTS:  None    MEDICAL DECISION MAKING / ASSESSMENT / Dorcas Cam is a 48 y.o. male with a history andpresentation as described above in HPI. The patient was evaluated by myself, the ED Attending Physician. On initial encounter the patient was in no acute distress with reassuring vitals and no signs of impending hemodynamic or respiratory collapse. Patient presents to the emergency department with abdominal pain that has been chronic in nature. He is currently quadruple therapy without improvement. He is having worsening pain  To be evaluated.   We will proceed with laboratory work-up and possible cross-sectional imaging given his comorbidities. .  Laboratory work-up is overall reassuring with no acute abnormalities to negate his etiology. CT imaging was obtained and did not show any further concerns. Unclear etiology of the patient's pain however he is overall well-appearing with reassuring vitals and a normal work-up including cross-sectional imaging. He had improvement of his pain with Bentyl and will be discharged on a short course with plans to follow-up with his GI team.    Patient was treated with below medications:  Medications - No data to display    At this time the patient has been deemed safe for discharge. Verbal discharge instructionsincluding strict return precautions for worsening or new symptoms have been communicated. The patient was advised to follow up with GI. Clinical Impression     1. Abdominal pain, unspecified abdominal location        Disposition     PATIENT REFERREDTO:  No follow-up provider specified.     DISCHARGE MEDICATIONS:  New Prescriptions    No medications on file       DISPOSITION           Mane Tolentino MD  08/18/22 6002

## 2022-08-11 NOTE — DISCHARGE INSTRUCTIONS
The exact cause of your abdominal pain is unknown today, but your tests in the emergency department were normal. Lots of people develop abdominal pain that ultimately goes away on its own. Drink plenty of fluids, at least 6-8 glasses of water per day. Be sure to eat plenty of fiber. Follow-up with your primary care doctor within 1 week should you continue to experience your symptoms. Return to the ED if you develop any fevers, worsening/new/different abdominal pains, or nausea and vomiting that keep you from being able to eat/drink.

## 2022-08-15 ENCOUNTER — HOSPITAL ENCOUNTER (EMERGENCY)
Age: 47
Discharge: HOME | End: 2022-08-15
Attending: EMERGENCY MEDICINE
Payer: COMMERCIAL

## 2022-08-15 VITALS
OXYGEN SATURATION: 98 % | HEART RATE: 82 BPM | SYSTOLIC BLOOD PRESSURE: 135 MMHG | WEIGHT: 140 LBS | BODY MASS INDEX: 22.5 KG/M2 | RESPIRATION RATE: 16 BRPM | HEIGHT: 66 IN | DIASTOLIC BLOOD PRESSURE: 92 MMHG | TEMPERATURE: 97.9 F

## 2022-08-15 DIAGNOSIS — R10.9 ABDOMINAL PAIN, UNSPECIFIED ABDOMINAL LOCATION: Primary | ICD-10-CM

## 2022-08-15 DIAGNOSIS — R11.0 NAUSEA: ICD-10-CM

## 2022-08-15 LAB
A/G RATIO: 2.7 (ref 1.1–2.2)
ALBUMIN SERPL-MCNC: 4.9 G/DL (ref 3.4–5)
ALP BLD-CCNC: 88 U/L (ref 40–129)
ALT SERPL-CCNC: 15 U/L (ref 10–40)
ANION GAP SERPL CALCULATED.3IONS-SCNC: 13 MMOL/L (ref 3–16)
AST SERPL-CCNC: 18 U/L (ref 15–37)
BASOPHILS ABSOLUTE: 0.1 K/UL (ref 0–0.2)
BASOPHILS RELATIVE PERCENT: 2 %
BILIRUB SERPL-MCNC: 0.6 MG/DL (ref 0–1)
BUN BLDV-MCNC: 11 MG/DL (ref 7–20)
CALCIUM SERPL-MCNC: 9.8 MG/DL (ref 8.3–10.6)
CHLORIDE BLD-SCNC: 98 MMOL/L (ref 99–110)
CO2: 26 MMOL/L (ref 21–32)
CREAT SERPL-MCNC: 0.8 MG/DL (ref 0.9–1.3)
EOSINOPHILS ABSOLUTE: 0.1 K/UL (ref 0–0.6)
EOSINOPHILS RELATIVE PERCENT: 2.4 %
GFR AFRICAN AMERICAN: >60
GFR NON-AFRICAN AMERICAN: >60
GLUCOSE BLD-MCNC: 121 MG/DL (ref 70–99)
HCT VFR BLD CALC: 42 % (ref 40.5–52.5)
HEMOGLOBIN: 14.7 G/DL (ref 13.5–17.5)
LIPASE: 47 U/L (ref 13–60)
LYMPHOCYTES ABSOLUTE: 0.6 K/UL (ref 1–5.1)
LYMPHOCYTES RELATIVE PERCENT: 15.4 %
MCH RBC QN AUTO: 32.3 PG (ref 26–34)
MCHC RBC AUTO-ENTMCNC: 35 G/DL (ref 31–36)
MCV RBC AUTO: 92.3 FL (ref 80–100)
MONOCYTES ABSOLUTE: 0.6 K/UL (ref 0–1.3)
MONOCYTES RELATIVE PERCENT: 14.9 %
NEUTROPHILS ABSOLUTE: 2.5 K/UL (ref 1.7–7.7)
NEUTROPHILS RELATIVE PERCENT: 65.3 %
PDW BLD-RTO: 13.4 % (ref 12.4–15.4)
PLATELET # BLD: 141 K/UL (ref 135–450)
PMV BLD AUTO: 8.5 FL (ref 5–10.5)
POTASSIUM SERPL-SCNC: 3.9 MMOL/L (ref 3.5–5.1)
RBC # BLD: 4.55 M/UL (ref 4.2–5.9)
SODIUM BLD-SCNC: 137 MMOL/L (ref 136–145)
TOTAL PROTEIN: 6.7 G/DL (ref 6.4–8.2)
WBC # BLD: 3.9 K/UL (ref 4–11)

## 2022-08-15 PROCEDURE — 6370000000 HC RX 637 (ALT 250 FOR IP): Performed by: EMERGENCY MEDICINE

## 2022-08-15 PROCEDURE — 80053 COMPREHEN METABOLIC PANEL: CPT

## 2022-08-15 PROCEDURE — 96375 TX/PRO/DX INJ NEW DRUG ADDON: CPT

## 2022-08-15 PROCEDURE — 6360000002 HC RX W HCPCS: Performed by: EMERGENCY MEDICINE

## 2022-08-15 PROCEDURE — 83690 ASSAY OF LIPASE: CPT

## 2022-08-15 PROCEDURE — 2500000003 HC RX 250 WO HCPCS: Performed by: EMERGENCY MEDICINE

## 2022-08-15 PROCEDURE — 85025 COMPLETE CBC W/AUTO DIFF WBC: CPT

## 2022-08-15 PROCEDURE — C9113 INJ PANTOPRAZOLE SODIUM, VIA: HCPCS | Performed by: EMERGENCY MEDICINE

## 2022-08-15 PROCEDURE — 96374 THER/PROPH/DIAG INJ IV PUSH: CPT

## 2022-08-15 PROCEDURE — 99284 EMERGENCY DEPT VISIT MOD MDM: CPT

## 2022-08-15 RX ORDER — KETAMINE HYDROCHLORIDE 50 MG/ML
0.3 INJECTION, SOLUTION, CONCENTRATE INTRAMUSCULAR; INTRAVENOUS ONCE
Status: COMPLETED | OUTPATIENT
Start: 2022-08-15 | End: 2022-08-15

## 2022-08-15 RX ORDER — DROPERIDOL 2.5 MG/ML
1.25 INJECTION, SOLUTION INTRAMUSCULAR; INTRAVENOUS ONCE
Status: COMPLETED | OUTPATIENT
Start: 2022-08-15 | End: 2022-08-15

## 2022-08-15 RX ORDER — PANTOPRAZOLE SODIUM 40 MG/10ML
40 INJECTION, POWDER, LYOPHILIZED, FOR SOLUTION INTRAVENOUS ONCE
Status: COMPLETED | OUTPATIENT
Start: 2022-08-15 | End: 2022-08-15

## 2022-08-15 RX ADMIN — PANTOPRAZOLE SODIUM 40 MG: 40 INJECTION, POWDER, FOR SOLUTION INTRAVENOUS at 04:27

## 2022-08-15 RX ADMIN — DROPERIDOL 1.25 MG: 2.5 INJECTION, SOLUTION INTRAMUSCULAR; INTRAVENOUS at 04:28

## 2022-08-15 RX ADMIN — ALUMINUM HYDROXIDE, MAGNESIUM HYDROXIDE, AND SIMETHICONE: 200; 200; 20 SUSPENSION ORAL at 04:26

## 2022-08-15 RX ADMIN — KETAMINE HYDROCHLORIDE 20 MG: 50 INJECTION INTRAMUSCULAR; INTRAVENOUS at 05:45

## 2022-08-15 ASSESSMENT — PAIN DESCRIPTION - LOCATION: LOCATION: ABDOMEN

## 2022-08-15 ASSESSMENT — PAIN SCALES - GENERAL
PAINLEVEL_OUTOF10: 0
PAINLEVEL_OUTOF10: 7
PAINLEVEL_OUTOF10: 7
PAINLEVEL_OUTOF10: 6
PAINLEVEL_OUTOF10: 1

## 2022-08-15 ASSESSMENT — LIFESTYLE VARIABLES: HOW OFTEN DO YOU HAVE A DRINK CONTAINING ALCOHOL: NEVER

## 2022-08-15 ASSESSMENT — PAIN DESCRIPTION - ORIENTATION: ORIENTATION: MID

## 2022-08-15 ASSESSMENT — PAIN DESCRIPTION - DESCRIPTORS: DESCRIPTORS: SHARP

## 2022-08-15 ASSESSMENT — PAIN - FUNCTIONAL ASSESSMENT: PAIN_FUNCTIONAL_ASSESSMENT: 0-10

## 2022-08-15 ASSESSMENT — PAIN DESCRIPTION - FREQUENCY: FREQUENCY: CONTINUOUS

## 2022-08-15 NOTE — DISCHARGE INSTRUCTIONS
You were seen in the emergency department for abdominal pain and nausea. Your labs were reassuring. You felt better after pain medication called ketamine. Please follow-up with your GI doctor as scheduled later this week for your endoscopy. Continue your prescribed medications at home without any changes at this time. Call 911 or go to the nearest Emergency Department immediately for worsening symptoms, difficulty breathing, chest pain, lightheadedness, difficulty moving or talking, sensory loss, difficulty controlling your bowel or bladder function, altered level of consciousness, neck stiffness, uncontrollable nausea or vomiting, uncontrollable pain, inability to tolerate oral intake, fever, chills, severe bleeding, signs of infection (spreading redness, area feels very warm, swelling, pain, foul-smelling drainage), suicidal or homicidal ideation, or any other concerns. If we have prescribed you any new medications, please take them as prescribed and read the drug package insert carefully. Do not drink alcohol, drive, or operate machinery if you are taking narcotic pain medications.

## 2022-08-15 NOTE — ED NOTES
Tolerated PO intake and states feeling much better now.  Ready for discharge     Pedro Samuel RN  08/15/22 [id number]

## 2022-08-15 NOTE — ED PROVIDER NOTES
[address] [facility]          ATTENDING PHYSICIAN NOTE       Date of evaluation: 8/15/2022    Chief Complaint     Abdominal Pain and Nausea (Pt states 3rd visit here for mid abdominal pain and nausea. No emesis. Has appt for EGD on Thursday)      History of Present Illness     Eva Dao is a 48 y.o. male with history of IgG lambda myeloma, GERD, hypertension presenting to the emergency department today for abdominal pain and nausea. Patient states he has been having severe abdominal pain and acid reflux symptoms, particularly at night and when laying flat. He notes it is a severe sharp and burning pain in his central abdomen. Denies vomiting. He has had multiple other visits to the emergency department for the symptoms. He is scheduled for an endoscopy on 8/18, but does not feel as though he can tolerate the pain until that time. He is on twice daily PPI as well as H. pylori antibiotics. On his most recent visit he underwent CT of the abdomen and pelvis that did not show any acute alternative cause of his pain. Review of Systems     Pertinent positive and negative findings as documented in the HPI. Otherwise all other systems were reviewed and were negative. Physical Exam     INITIAL VITALS: BP: (!) 146/84, Temp: 97.9 °F (36.6 °C), Heart Rate: 78, Resp: 18, SpO2: 99 %     Nursing note and vitals reviewed. General:  Adult male, alert and appropriately interactive. In no distress. HENT: Normocephalic and atraumatic. External ears normal. Nose appears normal externally. Eyes: Conjunctivae normal. No scleral icterus. Neck: Neck supple. No tracheal deviation present. CV: Normal rate. Regular rhythm. S1/S2 auscultated. No murmurs, gallops or rubs. Pulm: Effort normal. Breath sounds clear to auscultation bilaterally. No wheezes. No rales or rhonchi. GI: Soft. No distension. No reporducible tenderness. No rebound or guarding. No masses. No peritoneal signs. Musculoskeletal: No edema. No gross deformities. Neurological: Alert and appropriately interactive. Face symmetric, speech without dysarthria or obvious aphasia. Moving all extremities spontaneously. Skin: Warm, dry. No rash. No diaphoresis or erythema. Procedures   Procedures    MEDICAL DECISION MAKING     MDM: Tonia Brown is a 48 y.o. male with history as above presenting to the emergency department today for abdominal pain and nausea. On arrival, he is in no distress, vital signs reassuring. He has a benign exam and no tenderness to palpation of the abdomen. He was treated symptomatically and laboratory studies obtained. Labs are reassuring today. Given his recent CT imaging without acute findings, I do not feel additional imaging is necessary at this time. He did not feel improved after initial round of medications including droperidol, GI cocktail, and IV Protonix. He was then given of pain dose ketamine with significant improvement. He was able to tolerate p.o. intake. All questions answered to his satisfaction and he will plan to follow-up with GI as scheduled for his endoscopy. Discharged in stable condition with written and verbal instructions for which to return to the ED. Clinical Impression     1. Abdominal pain, unspecified abdominal location    2. Nausea        Disposition     DISPOSITION Discharge - Pending Orders Complete 08/15/2022 06:35:23 AM        Chi Coleman MD  6:44 AM                     Past Medical, Surgical, Family, and Social History     He has a past medical history of Allergic rhinitis, GERD (gastroesophageal reflux disease), HTN (hypertension), Hyperlipidemia, Lumbar spondylolysis, Multiple myeloma not having achieved remission ([address] PONV (postoperative nausea and vomiting), Tight ring on finger, and Vitamin D deficiency.   He has a past surgical history that includes [place] tooth extraction; hernia repair (as infant); Vasectomy (2010); hernia repair (N/A, 6/23/2021); and IR TUNNELED CVC PLACE WO SQ PORT/PUMP > 5 YEARS (7/22/2022). His family history includes Anxiety Disorder in his mother; Crohn's Disease in an other family member; Depression in his mother; High Cholesterol in his father and mother; Hypertension in his mother; Other in his maternal grandfather; Prostate Cancer (age of onset: 77) in his father; Stroke in his mother. He reports that he has never smoked. He has never used smokeless tobacco. He reports current alcohol use. He reports that he does not use drugs. Medications     Previous Medications    ASCORBIC ACID (VITAMIN C) 500 MG CAPS    Take 1 capsule by mouth daily    ATORVASTATIN (LIPITOR) 20 MG TABLET    Take 1 tablet by mouth every evening TAKE 1 TABLET DAILY    CHOLECALCIFEROL (VITAMIN D PO)    Take 2,000 Int'l Units by mouth daily     DICYCLOMINE (BENTYL) 10 MG CAPSULE    Take 1 capsule by mouth in the morning and 1 capsule at noon and 1 capsule in the evening and 1 capsule before bedtime. LISINOPRIL (PRINIVIL;ZESTRIL) 20 MG TABLET    Take 1 tablet by mouth daily    PANTOPRAZOLE (PROTONIX) 40 MG TABLET    Take 1 tablet by mouth in the morning and 1 tablet in the evening. Allergies     He has No Known Allergies. ED Course     Nursing Notes, Past Medical Hx, Past Surgical Hx, Social Hx,Allergies, and Family Hx were reviewed.     Patient was given the following medications:  Orders Placed This Encounter   Medications    pantoprazole (PROTONIX) injection 40 mg    aluminum & magnesium hydroxide-simethicone (MAALOX) 30 mL, lidocaine viscous hcl (XYLOCAINE) 5 mL (GI COCKTAIL)    droperidol (INAPSINE) injection 1.25 mg    ketamine (KETALAR) injection 20 mg       Diagnostic Results     RECENT VITALS:  BP: 129/86,Temp: 97.9 °F (36.6 °C), Heart Rate: 72, Resp: 16, SpO2: 91 %     RADIOLOGY:  No orders to display       LABS:   Results for orders placed or performed during the hospital encounter of 08/15/22   CBC with Auto Differential Result Value Ref Range    WBC 3.9 (L) 4.0 - 11.0 K/uL    RBC 4.55 4.20 - 5.90 M/uL    Hemoglobin 14.7 13.5 - 17.5 g/dL    Hematocrit 42.0 40.5 - 52.5 %    MCV 92.3 80.0 - 100.0 fL    MCH 32.3 26.0 - 34.0 pg    MCHC 35.0 31.0 - 36.0 g/dL    RDW 13.4 12.4 - 15.4 %    Platelets 396 094 - 026 K/uL    MPV 8.5 5.0 - 10.5 fL    Neutrophils % 65.3 %    Lymphocytes % 15.4 %    Monocytes % 14.9 %    Eosinophils % 2.4 %    Basophils % 2.0 %    Neutrophils Absolute 2.5 1.7 - 7.7 K/uL    Lymphocytes Absolute 0.6 (L) 1.0 - 5.1 K/uL    Monocytes Absolute 0.6 0.0 - 1.3 K/uL    Eosinophils Absolute 0.1 0.0 - 0.6 K/uL    Basophils Absolute 0.1 0.0 - 0.2 K/uL   CMP   Result Value Ref Range    Sodium 137 136 - 145 mmol/L    Potassium 3.9 3.5 - 5.1 mmol/L    Chloride 98 (L) 99 - 110 mmol/L    CO2 26 21 - 32 mmol/L    Anion Gap 13 3 - 16    Glucose 121 (H) 70 - 99 mg/dL    BUN 11 7 - 20 mg/dL    Creatinine 0.8 (L) 0.9 - 1.3 mg/dL    GFR Non-African American >60 >60    GFR African American >60 >60    Calcium 9.8 8.3 - 10.6 mg/dL    Total Protein 6.7 6.4 - 8.2 g/dL    Albumin 4.9 3.4 - 5.0 g/dL    Albumin/Globulin Ratio 2.7 (H) 1.1 - 2.2    Total Bilirubin 0.6 0.0 - 1.0 mg/dL    Alkaline Phosphatase 88 40 - 129 U/L    ALT 15 10 - 40 U/L    AST 18 15 - 37 U/L   Lipase   Result Value Ref Range    Lipase 47.0 13.0 - 60.0 U/L       CONSULTS:  None    PATIENT REFERRED TO:  [facility] Emergency Department  [address]  [address]  [phone]    If symptoms worsen    DISCHARGE MEDICATIONS:  New Prescriptions    No medications on file          Yolanda Pereyra MD  08/15/22 [id number]

## 2022-08-26 ENCOUNTER — HOSPITAL ENCOUNTER (OUTPATIENT)
Dept: ONCOLOGY | Age: 47
Setting detail: INFUSION SERIES
Discharge: HOME | End: 2022-08-26

## 2022-08-26 VITALS — HEIGHT: 66 IN | BODY MASS INDEX: 22.6 KG/M2

## 2022-08-26 LAB
BASOPHILS ABSOLUTE: 0.01 /ΜL
BASOPHILS RELATIVE PERCENT: 0.2 %
EOSINOPHILS ABSOLUTE: 0.06 /ΜL
EOSINOPHILS RELATIVE PERCENT: 1 %
HCT VFR BLD CALC: 44.6 % (ref 41–53)
HEMOGLOBIN: 16 G/DL (ref 13.5–17.5)
LYMPHOCYTES ABSOLUTE: NORMAL
LYMPHOCYTES RELATIVE PERCENT: 11.4 %
MCH RBC QN AUTO: NORMAL PG
MCHC RBC AUTO-ENTMCNC: NORMAL G/DL
MCV RBC AUTO: NORMAL FL
MONOCYTES ABSOLUTE: NORMAL
MONOCYTES RELATIVE PERCENT: 11.9 %
NEUTROPHILS ABSOLUTE: NORMAL
NEUTROPHILS RELATIVE PERCENT: 75.5 %
PLATELET # BLD: 125 K/ΜL
PMV BLD AUTO: NORMAL FL
RBC # BLD: NORMAL 10*6/UL
WBC # BLD: 5.8 10^3/ML

## 2022-08-26 RX ORDER — SODIUM CHLORIDE 9 MG/ML
INJECTION, SOLUTION INTRAVENOUS CONTINUOUS PRN
Status: CANCELLED | OUTPATIENT
Start: 2022-08-26

## 2022-08-26 RX ORDER — ONDANSETRON 2 MG/ML
8 INJECTION INTRAMUSCULAR; INTRAVENOUS EVERY 8 HOURS PRN
Status: CANCELLED | OUTPATIENT
Start: 2022-08-26

## 2022-08-26 RX ORDER — POTASSIUM CHLORIDE 20 MEQ/1
40 TABLET, EXTENDED RELEASE ORAL PRN
Status: CANCELLED | OUTPATIENT
Start: 2022-08-26

## 2022-08-26 RX ORDER — POTASSIUM CHLORIDE 29.8 MG/ML
80 INJECTION INTRAVENOUS PRN
Status: CANCELLED | OUTPATIENT
Start: 2022-08-26

## 2022-08-26 RX ORDER — HEPARIN SODIUM (PORCINE) LOCK FLUSH IV SOLN 100 UNIT/ML 100 UNIT/ML
500 SOLUTION INTRAVENOUS PRN
Status: CANCELLED | OUTPATIENT
Start: 2022-08-26

## 2022-08-26 RX ORDER — OXYCODONE HYDROCHLORIDE 5 MG/1
5 TABLET ORAL EVERY 4 HOURS PRN
Status: CANCELLED | OUTPATIENT
Start: 2022-08-26

## 2022-08-26 RX ORDER — PETROLATUM, MENTHOL, UNSPECIFIED FORM, CAMPHOR (SYNTHETIC), AND PHENOL 59.14; 1; 1; .6 G/100G; G/100G; G/100G; G/100G
PASTE TOPICAL PRN
Status: CANCELLED | OUTPATIENT
Start: 2022-08-26

## 2022-08-26 RX ORDER — PROCHLORPERAZINE EDISYLATE 5 MG/ML
10 INJECTION INTRAMUSCULAR; INTRAVENOUS EVERY 6 HOURS PRN
Status: CANCELLED | OUTPATIENT
Start: 2022-08-26

## 2022-08-26 RX ORDER — 0.9 % SODIUM CHLORIDE 0.9 %
1000 INTRAVENOUS SOLUTION INTRAVENOUS ONCE
Status: CANCELLED | OUTPATIENT
Start: 2022-08-26 | End: 2022-08-26

## 2022-08-26 RX ORDER — ONDANSETRON 4 MG/1
8 TABLET, FILM COATED ORAL EVERY 8 HOURS PRN
Status: CANCELLED | OUTPATIENT
Start: 2022-08-26

## 2022-08-26 RX ORDER — OXYCODONE HYDROCHLORIDE 5 MG/1
10 TABLET ORAL EVERY 4 HOURS PRN
Status: CANCELLED | OUTPATIENT
Start: 2022-08-26

## 2022-08-26 RX ORDER — PROCHLORPERAZINE MALEATE 10 MG
10 TABLET ORAL EVERY 6 HOURS PRN
Status: CANCELLED | OUTPATIENT
Start: 2022-08-26

## 2022-08-26 RX ORDER — MAGNESIUM SULFATE IN WATER 40 MG/ML
4000 INJECTION, SOLUTION INTRAVENOUS PRN
Status: CANCELLED | OUTPATIENT
Start: 2022-08-26

## 2022-08-26 NOTE — PROGRESS NOTES
Nutrition History and Dietary Recall     Food / Nutrition-Related History  Food Allergies or Intolerances:  none   Vitamins, Minerals, and other Herbal Supplements: Vit-D; 1000 ID    Food Restrictions / Cultural Requests:    none   Followed diet for medical reasons: no   Made dietary/lifestyle changes in past: yes Outcomes:   Food preferences: pt prefers to eat every 3-4 hours;      Current Nutrition:   PO Diet: Regular   Oral Nutrition Supplements:  muscle milk; 2-3 x per week   PO Intake: %  PO Supplement Intake: %    24 hour recall/food frequency:  Breakfast: yes. Consumes: 7 out of 7 days  Current meal includes: high-fiber oatmeal + PB toast; [place] Yogurt; Cereal- honey chaudhry; raisin bran   PO Intake: %  Fluids: milk 6-8 oz; 20 oz coffee;      AM Snack: yes   Consumes: 7 out of 7 days  Current snack includes: greek yogurt + fruit + 1-2 oz pkg heart healthy   PO Intake: %  Fluids: water     Lunch: yes  Consumes: 7 out of 7 days  Current meal includes: 1/2 turkey sand w/swiss; soup- homemade; 8-12 oz; banana/or other fruit   PO Intake: %  Fluids: water        PM Snack: yes  Consumes: 7 out of 7 days  Current snack includes: pb crackers; ritz + pb; 3-5;   PO Intake: %  Fluids: water      Dinner: yes   Consumes: 7 out of 7 days  Current meal includes: chicken brst + stir-fried veg; rice; bbq chkn bst; vegetable; broccoli/cauli; sometimes eating out; burgers;   PO Intake: %  Fluids: water     HS Snack: yes  Consumes: 3-4 out of 7 days  Current snack includes: ice cream; ~1 cup   PO Intake: %  Fluids: no      Additional Fluids/Drinks:   Do you drink alcohol? yes. Has not drank etoh in weeks; How often/how much alcohol do you drink: 1 Beers per week. Additional Nutrition-related Rx yes  Recently dx w/H. Pylori Started flagyl/doxycycline/bismith;   Protonix BID;    Bowel-regimen (pro-biotics, fiber, stool softener, anti-diarrheal)     Food & Meal Prep:   Responsible party for grocery shopping/meal planning: Cooking is pt; Shopping both   Patient/Spouse/Family prepare meals at home 5 times per week. Patient does dine outside of the home for meals; 3-4 x week   Patient dines out to a sit down restaurant 1-2 times per week. Patient dines out to a fast food restaurant 1-2 times per week. Food & Meal-time Behaviors:   Patient does have meal/snack pattern    Patient does not skip meals > 4 days per week. Patient does not have grazing. Patient does not have night eating. Patient does not have a history of emotional eating or emotional anorexia with significant weight loss. Patient does not avoid complete food groups   Patient does not self-restrict foods (portions, times, food-groups, preparations)     Barriers to patient nutrition:   Patient does not have altered taste or loss of taste or smell  Patient does not have difficulty chewing/swallowing   Patient does not wear dentures. Patient does have nausea/vomiting/abdominal cramping/pain before, during or after eating   Patient does not have irregular bowel regimen (constipation/diarrhea)  Patient does not have food insecurity   Patient does not currently receive food assistance   Patient does have adequate access to safe food storage and preparation   Patient does not have cistern/well-water supply as primary source of water at primary residence  Other: reports last 3-5 weeks he has eaten \"less for him\" but they are still adequate/regular portions of foods. Weight loss reflected in EMR is r/t to pt on steroids vs nutrition related weight loss. Please see full nutrition evaluation.      Emerson Lopez RD, LD

## 2022-08-29 ENCOUNTER — HOSPITAL ENCOUNTER (OUTPATIENT)
Dept: ONCOLOGY | Age: 47
Setting detail: INFUSION SERIES
Discharge: HOME | End: 2022-08-29
Payer: COMMERCIAL

## 2022-08-29 ENCOUNTER — HOSPITAL ENCOUNTER (OUTPATIENT)
Dept: GENERAL RADIOLOGY | Age: 47
Discharge: HOME | End: 2022-08-29
Payer: COMMERCIAL

## 2022-08-29 VITALS
RESPIRATION RATE: 16 BRPM | HEART RATE: 74 BPM | SYSTOLIC BLOOD PRESSURE: 120 MMHG | DIASTOLIC BLOOD PRESSURE: 80 MMHG | WEIGHT: 138.01 LBS | BODY MASS INDEX: 22.18 KG/M2 | HEIGHT: 66 IN | OXYGEN SATURATION: 99 % | TEMPERATURE: 98 F

## 2022-08-29 DIAGNOSIS — C90.00 MULTIPLE MYELOMA NOT HAVING ACHIEVED REMISSION (HCC): Primary | ICD-10-CM

## 2022-08-29 LAB
ALBUMIN SERPL-MCNC: 4.8 G/DL (ref 3.4–5)
ALP BLD-CCNC: 86 U/L (ref 40–129)
ALT SERPL-CCNC: 12 U/L (ref 10–40)
ANION GAP SERPL CALCULATED.3IONS-SCNC: 8 MMOL/L (ref 3–16)
AST SERPL-CCNC: 15 U/L (ref 15–37)
BACTERIA: ABNORMAL /HPF
BASOPHILS ABSOLUTE: 0 K/UL (ref 0–0.2)
BASOPHILS RELATIVE PERCENT: 0.3 %
BILIRUB SERPL-MCNC: 0.7 MG/DL (ref 0–1)
BILIRUBIN DIRECT: <0.2 MG/DL (ref 0–0.3)
BILIRUBIN URINE: NEGATIVE
BILIRUBIN, INDIRECT: NORMAL MG/DL (ref 0–1)
BLOOD, URINE: NEGATIVE
BUN BLDV-MCNC: 12 MG/DL (ref 7–20)
CALCIUM SERPL-MCNC: 9.3 MG/DL (ref 8.3–10.6)
CHLORIDE BLD-SCNC: 101 MMOL/L (ref 99–110)
CLARITY: CLEAR
CO2: 28 MMOL/L (ref 21–32)
COLOR: YELLOW
CREAT SERPL-MCNC: 0.7 MG/DL (ref 0.9–1.3)
EOSINOPHILS ABSOLUTE: 0.1 K/UL (ref 0–0.6)
EOSINOPHILS RELATIVE PERCENT: 1.9 %
GFR AFRICAN AMERICAN: >60
GFR NON-AFRICAN AMERICAN: >60
GLUCOSE BLD-MCNC: 89 MG/DL (ref 70–99)
GLUCOSE URINE: NEGATIVE MG/DL
HCT VFR BLD CALC: 43.7 % (ref 40.5–52.5)
HEMOGLOBIN: 14.6 G/DL (ref 13.5–17.5)
INR BLD: 0.99 (ref 0.87–1.14)
KETONES, URINE: NEGATIVE MG/DL
LACTATE DEHYDROGENASE: 180 U/L (ref 100–190)
LEUKOCYTE ESTERASE, URINE: NEGATIVE
LYMPHOCYTES ABSOLUTE: 0.4 K/UL (ref 1–5.1)
LYMPHOCYTES RELATIVE PERCENT: 10.1 %
MAGNESIUM: 1.9 MG/DL (ref 1.8–2.4)
MCH RBC QN AUTO: 31.4 PG (ref 26–34)
MCHC RBC AUTO-ENTMCNC: 33.5 G/DL (ref 31–36)
MCV RBC AUTO: 93.7 FL (ref 80–100)
MICROSCOPIC EXAMINATION: YES
MONOCYTES ABSOLUTE: 0.5 K/UL (ref 0–1.3)
MONOCYTES RELATIVE PERCENT: 12.1 %
NEUTROPHILS ABSOLUTE: 3.3 K/UL (ref 1.7–7.7)
NEUTROPHILS RELATIVE PERCENT: 75.6 %
NITRITE, URINE: POSITIVE
PDW BLD-RTO: 12.9 % (ref 12.4–15.4)
PH UA: 6 (ref 5–8)
PHOSPHORUS: 3.6 MG/DL (ref 2.5–4.9)
PLATELET # BLD: 128 K/UL (ref 135–450)
PMV BLD AUTO: 8.3 FL (ref 5–10.5)
POTASSIUM SERPL-SCNC: 4.3 MMOL/L (ref 3.5–5.1)
PROTEIN UA: NEGATIVE MG/DL
PROTHROMBIN TIME: 13 SEC (ref 11.7–14.5)
RBC # BLD: 4.66 M/UL (ref 4.2–5.9)
RBC UA: ABNORMAL /HPF (ref 0–4)
SODIUM BLD-SCNC: 137 MMOL/L (ref 136–145)
SPECIFIC GRAVITY UA: 1.02 (ref 1–1.03)
TOTAL PROTEIN: 6.7 G/DL (ref 6.4–8.2)
URIC ACID, SERUM: 2.6 MG/DL (ref 3.5–7.2)
URINE TYPE: ABNORMAL
UROBILINOGEN, URINE: 0.2 E.U./DL
WBC # BLD: 4.4 K/UL (ref 4–11)
WBC UA: ABNORMAL /HPF (ref 0–5)

## 2022-08-29 PROCEDURE — 96413 CHEMO IV INFUSION 1 HR: CPT

## 2022-08-29 PROCEDURE — 85025 COMPLETE CBC W/AUTO DIFF WBC: CPT

## 2022-08-29 PROCEDURE — 2580000003 HC RX 258: Performed by: INTERNAL MEDICINE

## 2022-08-29 PROCEDURE — 84100 ASSAY OF PHOSPHORUS: CPT

## 2022-08-29 PROCEDURE — 6370000000 HC RX 637 (ALT 250 FOR IP): Performed by: INTERNAL MEDICINE

## 2022-08-29 PROCEDURE — 6360000002 HC RX W HCPCS: Performed by: INTERNAL MEDICINE

## 2022-08-29 PROCEDURE — 83615 LACTATE (LD) (LDH) ENZYME: CPT

## 2022-08-29 PROCEDURE — 80048 BASIC METABOLIC PNL TOTAL CA: CPT

## 2022-08-29 PROCEDURE — 36592 COLLECT BLOOD FROM PICC: CPT

## 2022-08-29 PROCEDURE — 84550 ASSAY OF BLOOD/URIC ACID: CPT

## 2022-08-29 PROCEDURE — 96361 HYDRATE IV INFUSION ADD-ON: CPT

## 2022-08-29 PROCEDURE — 81001 URINALYSIS AUTO W/SCOPE: CPT

## 2022-08-29 PROCEDURE — 96360 HYDRATION IV INFUSION INIT: CPT

## 2022-08-29 PROCEDURE — 85610 PROTHROMBIN TIME: CPT

## 2022-08-29 PROCEDURE — 83735 ASSAY OF MAGNESIUM: CPT

## 2022-08-29 PROCEDURE — 6370000000 HC RX 637 (ALT 250 FOR IP): Performed by: NURSE PRACTITIONER

## 2022-08-29 PROCEDURE — 71045 X-RAY EXAM CHEST 1 VIEW: CPT

## 2022-08-29 PROCEDURE — 80076 HEPATIC FUNCTION PANEL: CPT

## 2022-08-29 RX ORDER — ONDANSETRON 8 MG/1
8 TABLET, ORALLY DISINTEGRATING ORAL EVERY 8 HOURS PRN
Qty: 30 TABLET | Refills: 2 | Status: SHIPPED | OUTPATIENT
Start: 2022-08-29 | End: 2022-08-31 | Stop reason: HOSPADM

## 2022-08-29 RX ORDER — POTASSIUM CHLORIDE 20 MEQ/1
40 TABLET, EXTENDED RELEASE ORAL PRN
Status: CANCELLED | OUTPATIENT
Start: 2022-08-30

## 2022-08-29 RX ORDER — ACYCLOVIR 800 MG/1
800 TABLET ORAL 2 TIMES DAILY
Qty: 60 TABLET | Refills: 11 | Status: SHIPPED | OUTPATIENT
Start: 2022-08-29 | End: 2022-09-11 | Stop reason: HOSPADM

## 2022-08-29 RX ORDER — POTASSIUM CHLORIDE 20 MEQ/1
40 TABLET, EXTENDED RELEASE ORAL PRN
Status: DISCONTINUED | OUTPATIENT
Start: 2022-08-29 | End: 2022-08-30 | Stop reason: HOSPADM

## 2022-08-29 RX ORDER — 0.9 % SODIUM CHLORIDE 0.9 %
1000 INTRAVENOUS SOLUTION INTRAVENOUS ONCE
Status: CANCELLED | OUTPATIENT
Start: 2022-08-30 | End: 2022-08-30

## 2022-08-29 RX ORDER — LEVOFLOXACIN 500 MG/1
500 TABLET, FILM COATED ORAL EVERY EVENING
Qty: 30 TABLET | Refills: 0 | Status: SHIPPED | OUTPATIENT
Start: 2022-08-29 | End: 2022-09-03 | Stop reason: HOSPADM

## 2022-08-29 RX ORDER — SODIUM CHLORIDE 9 MG/ML
INJECTION, SOLUTION INTRAVENOUS CONTINUOUS PRN
Status: DISCONTINUED | OUTPATIENT
Start: 2022-08-29 | End: 2022-08-30 | Stop reason: HOSPADM

## 2022-08-29 RX ORDER — DEXAMETHASONE 4 MG/1
12 TABLET ORAL ONCE
Status: COMPLETED | OUTPATIENT
Start: 2022-08-29 | End: 2022-08-29

## 2022-08-29 RX ORDER — OLANZAPINE 10 MG/1
10 TABLET ORAL ONCE
Status: COMPLETED | OUTPATIENT
Start: 2022-08-29 | End: 2022-08-29

## 2022-08-29 RX ORDER — POTASSIUM CHLORIDE 29.8 MG/ML
20 INJECTION INTRAVENOUS PRN
Status: DISCONTINUED | OUTPATIENT
Start: 2022-08-29 | End: 2022-08-30 | Stop reason: HOSPADM

## 2022-08-29 RX ORDER — PROCHLORPERAZINE EDISYLATE 5 MG/ML
10 INJECTION INTRAMUSCULAR; INTRAVENOUS EVERY 6 HOURS PRN
Status: DISCONTINUED | OUTPATIENT
Start: 2022-08-29 | End: 2022-08-30 | Stop reason: HOSPADM

## 2022-08-29 RX ORDER — OXYCODONE HYDROCHLORIDE 5 MG/1
5 TABLET ORAL EVERY 4 HOURS PRN
Status: CANCELLED | OUTPATIENT
Start: 2022-08-30

## 2022-08-29 RX ORDER — HEPARIN SODIUM (PORCINE) LOCK FLUSH IV SOLN 100 UNIT/ML 100 UNIT/ML
500 SOLUTION INTRAVENOUS PRN
Status: DISCONTINUED | OUTPATIENT
Start: 2022-08-29 | End: 2022-08-30 | Stop reason: HOSPADM

## 2022-08-29 RX ORDER — GABAPENTIN 300 MG/1
300 CAPSULE ORAL DAILY
COMMUNITY

## 2022-08-29 RX ORDER — OLANZAPINE 5 MG/1
5 TABLET ORAL NIGHTLY
Qty: 3 TABLET | Refills: 0 | Status: SHIPPED | OUTPATIENT
Start: 2022-08-30 | End: 2022-09-02 | Stop reason: SDUPTHER

## 2022-08-29 RX ORDER — 0.9 % SODIUM CHLORIDE 0.9 %
1000 INTRAVENOUS SOLUTION INTRAVENOUS ONCE
Status: DISCONTINUED | OUTPATIENT
Start: 2022-08-29 | End: 2022-08-30 | Stop reason: HOSPADM

## 2022-08-29 RX ORDER — PROCHLORPERAZINE MALEATE 10 MG
10 TABLET ORAL EVERY 6 HOURS PRN
Status: CANCELLED | OUTPATIENT
Start: 2022-08-30

## 2022-08-29 RX ORDER — ONDANSETRON 2 MG/ML
8 INJECTION INTRAMUSCULAR; INTRAVENOUS EVERY 8 HOURS PRN
Status: CANCELLED | OUTPATIENT
Start: 2022-08-30

## 2022-08-29 RX ORDER — PROCHLORPERAZINE EDISYLATE 5 MG/ML
10 INJECTION INTRAMUSCULAR; INTRAVENOUS EVERY 6 HOURS PRN
Status: CANCELLED | OUTPATIENT
Start: 2022-08-30

## 2022-08-29 RX ORDER — ONDANSETRON 4 MG/1
8 TABLET, FILM COATED ORAL EVERY 8 HOURS PRN
Status: DISCONTINUED | OUTPATIENT
Start: 2022-08-29 | End: 2022-08-30 | Stop reason: HOSPADM

## 2022-08-29 RX ORDER — ONDANSETRON 4 MG/1
8 TABLET, FILM COATED ORAL EVERY 8 HOURS PRN
Status: CANCELLED | OUTPATIENT
Start: 2022-08-30

## 2022-08-29 RX ORDER — OXYCODONE HYDROCHLORIDE 5 MG/1
10 TABLET ORAL EVERY 4 HOURS PRN
Status: CANCELLED | OUTPATIENT
Start: 2022-08-30

## 2022-08-29 RX ORDER — 0.9 % SODIUM CHLORIDE 0.9 %
1000 INTRAVENOUS SOLUTION INTRAVENOUS ONCE
Status: COMPLETED | OUTPATIENT
Start: 2022-08-29 | End: 2022-08-29

## 2022-08-29 RX ORDER — OXYCODONE HYDROCHLORIDE 5 MG/1
10 TABLET ORAL EVERY 4 HOURS PRN
Status: DISCONTINUED | OUTPATIENT
Start: 2022-08-29 | End: 2022-08-30 | Stop reason: HOSPADM

## 2022-08-29 RX ORDER — OXYCODONE HYDROCHLORIDE 5 MG/1
5 TABLET ORAL EVERY 4 HOURS PRN
Status: DISCONTINUED | OUTPATIENT
Start: 2022-08-29 | End: 2022-08-30 | Stop reason: HOSPADM

## 2022-08-29 RX ORDER — ONDANSETRON 4 MG/1
24 TABLET, FILM COATED ORAL ONCE
Status: COMPLETED | OUTPATIENT
Start: 2022-08-29 | End: 2022-08-29

## 2022-08-29 RX ORDER — MAGNESIUM SULFATE IN WATER 40 MG/ML
4000 INJECTION, SOLUTION INTRAVENOUS PRN
Status: DISCONTINUED | OUTPATIENT
Start: 2022-08-29 | End: 2022-08-30 | Stop reason: HOSPADM

## 2022-08-29 RX ORDER — PETROLATUM, MENTHOL, UNSPECIFIED FORM, CAMPHOR (SYNTHETIC), AND PHENOL 59.14; 1; 1; .6 G/100G; G/100G; G/100G; G/100G
PASTE TOPICAL PRN
Status: CANCELLED | OUTPATIENT
Start: 2022-08-30

## 2022-08-29 RX ORDER — FLUCONAZOLE 200 MG/1
400 TABLET ORAL DAILY
Qty: 60 TABLET | Refills: 2 | Status: SHIPPED | OUTPATIENT
Start: 2022-08-30 | End: 2022-09-11 | Stop reason: HOSPADM

## 2022-08-29 RX ORDER — ONDANSETRON HYDROCHLORIDE 8 MG/1
24 TABLET, FILM COATED ORAL ONCE
Status: CANCELLED | OUTPATIENT
Start: 2022-08-29

## 2022-08-29 RX ORDER — DIMETHICONE, CAMPHOR (SYNTHETIC), MENTHOL, AND PHENOL 1.1; .5; .625; .5 G/100G; G/100G; G/100G; G/100G
OINTMENT TOPICAL PRN
Status: DISCONTINUED | OUTPATIENT
Start: 2022-08-29 | End: 2022-08-30 | Stop reason: HOSPADM

## 2022-08-29 RX ORDER — DEXAMETHASONE 4 MG/1
12 TABLET ORAL ONCE
Status: CANCELLED | OUTPATIENT
Start: 2022-08-29

## 2022-08-29 RX ORDER — 0.9 % SODIUM CHLORIDE 0.9 %
1000 INTRAVENOUS SOLUTION INTRAVENOUS ONCE
Status: CANCELLED | OUTPATIENT
Start: 2022-08-29

## 2022-08-29 RX ORDER — PROCHLORPERAZINE MALEATE 10 MG
10 TABLET ORAL EVERY 6 HOURS PRN
Qty: 120 TABLET | Refills: 3 | Status: SHIPPED | OUTPATIENT
Start: 2022-08-29 | End: 2022-09-03 | Stop reason: SDUPTHER

## 2022-08-29 RX ORDER — PROCHLORPERAZINE MALEATE 10 MG
10 TABLET ORAL EVERY 6 HOURS PRN
Status: DISCONTINUED | OUTPATIENT
Start: 2022-08-29 | End: 2022-08-30 | Stop reason: HOSPADM

## 2022-08-29 RX ORDER — ONDANSETRON 2 MG/ML
8 INJECTION INTRAMUSCULAR; INTRAVENOUS EVERY 8 HOURS PRN
Status: DISCONTINUED | OUTPATIENT
Start: 2022-08-29 | End: 2022-08-30 | Stop reason: HOSPADM

## 2022-08-29 RX ORDER — MAGNESIUM SULFATE IN WATER 40 MG/ML
4000 INJECTION, SOLUTION INTRAVENOUS PRN
Status: CANCELLED | OUTPATIENT
Start: 2022-08-30

## 2022-08-29 RX ORDER — SODIUM CHLORIDE 9 MG/ML
INJECTION, SOLUTION INTRAVENOUS CONTINUOUS PRN
Status: CANCELLED | OUTPATIENT
Start: 2022-08-30

## 2022-08-29 RX ORDER — POTASSIUM CHLORIDE 29.8 MG/ML
80 INJECTION INTRAVENOUS PRN
Status: CANCELLED | OUTPATIENT
Start: 2022-08-30

## 2022-08-29 RX ORDER — HEPARIN SODIUM (PORCINE) LOCK FLUSH IV SOLN 100 UNIT/ML 100 UNIT/ML
500 SOLUTION INTRAVENOUS PRN
Status: CANCELLED | OUTPATIENT
Start: 2022-08-30

## 2022-08-29 RX ADMIN — MELPHALAN HYDROCHLORIDE 350 MG: KIT at 12:09

## 2022-08-29 RX ADMIN — OLANZAPINE 10 MG: 10 TABLET, FILM COATED ORAL at 11:16

## 2022-08-29 RX ADMIN — DEXAMETHASONE 12 MG: 4 TABLET ORAL at 10:35

## 2022-08-29 RX ADMIN — SODIUM CHLORIDE 1000 ML: 9 INJECTION, SOLUTION INTRAVENOUS at 08:27

## 2022-08-29 RX ADMIN — ONDANSETRON HYDROCHLORIDE 24 MG: 4 TABLET, FILM COATED ORAL at 10:35

## 2022-08-29 RX ADMIN — Medication 500 UNITS: at 14:45

## 2022-08-29 NOTE — H&P
[facility] History and Physical        Attending Physician: No att. providers found    Primary Care: Cherrie Harvey MD       Referring MD: Catalina Beyer, APRN - CNP  [place],  [address]    Name: Hrady Jacob :  1975  MRN:  [id number]    Admission: 2022      Date: 2022    Reason for Admission: High Dose Melphalan preparative regimen followed by Autologous Stem Cell Transplant for IgG Lambda Multiple Myeloma    History of Present Illness: Mr. Cherise Olszewski is a 50year old male with past medical history significant for HTN, HLD & IgG Lambda Multiple Myeloma that orginaly presented when he wasseen for routine annual follow-up by Dr. Agus Wang on 2/15/2022. Abnormal labs were noted and her was referred for evaluation. He was seen for initial hematologic consultation on 3/02/2022. The workup showed: A. WBC 3.2 K/mcL, HGB 12.0 g/dL, HCT 35.5%,  K/mcL, ANC 1.75 K/mcl, MCV 99.2 fL. Myeloma labs showed SPEP showed: M2 (IgG lambda in mid-gamma) 6.6 g/dL. M1 was a free lambda light chain in slow beta that was TMTQ, Kappa 0.412 mg/dL, lambda 68.236 mg/dL, ratio 0.01, 24-hr urine showed total prot 852 mg/24 hrs. M1 was 1.3 gm of free monoclonal lambda in 24-hrs. Bone marrow biopsy, 3/10/2022, showed 70% cellularity with plasma cells estimated to account for between 30-50%. 46,XY[20]. Myeloma ROBIN panel showed three copies of CCND1 (11q13. 3) in 83% of cells, four copies of CCND1 (11q13. 3) in 5% of cells, three copies of MAF (16q23.2) in 54% of cells, and monosomy 13 in 9% of cells. PET/CT, 3/09/2022, showed no evidence of hypermetabolism. He started RVd on 2022. He has completed 4 cycles of treatment. Review of urine and protein studies from  shows VGPR.  His original transplant date of 22 was unfortunately put on hold due to GI issues including crampy epigastric/periumbilical abdominal pain which was likely acid reflux and he had a positive H. pylori test in his PCP office. He was seen by GI and cleared to move forward with ASCT after having negative EGD and colonoscopy. Review of pre transplant work up reveals no contraindication to proceeding. The only abnormality is a DLCO of 75% of predicted. No obvious etiology for this. His Comorbidity score is 3 based the DLCO and his age. He is now being seen in OP Infusion for Melphalan (200mg/M^2) followed by ASCT on 8/30/22. Only complaint is of peripheral neuropathy in legs following C4. He feels well today and denies fever, chills, sweats, change in appetite, visual changes, headache, sinus congestion, sore throat, mouth pain, cough, increased shortness of breath, chest pain, abdominal pain, nausea, vomiting, diarrhea, constipation, dysuria, hematuria, lower extremity pain or swelling. He has no complaints today.           Pre Transplant Workup:       Pre Transplant labs:   7/6/22 14:39 8/1/22 00:00 8/11/22 09:15   INFLUENZA A   NOT DETECTED   INFLUENZA B   NOT DETECTED   SARS-CoV-2 RNA, RT PCR   NOT DETECTED   CMV IgM <8.0     H Pylori IgA  <9.0    H. pylori IgM  15.5 (H)    VARICELLA ZOSTER AB IGM 0.04     VARICELLA ZOSTER AB IGG 47.9     Herpes Type 1/2 IgM Combined 0.16     (H): Data is abnormally high     Most Recent 7/6/22 14:39 8/1/22 00:00   H Pylori IgG 0.10  8/1/22 00:00  0.10   Herpes Type 1/2 IgM Combined 0.16  7/6/22 14:39 0.16    VARICELLA ZOSTER AB IGG 47.9  7/6/22 14:39 47.9    Hep A IgM Non-reactive  7/6/22 14:39 Non-reactive    Hep B E Ab Negative  7/6/22 14:39 Negative    Hep B S Ab 37.98  7/6/22 14:39 37.98        Past Surgical History:   Procedure Laterality Date    HERNIA REPAIR  as infant    Bilaterally    HERNIA REPAIR N/A 6/23/2021    ROBOTIC LEFT INGUINAL HERNIA REPAIR WITH MESH [facility]    IR TUNNELED CATHETER PLACEMENT GREATER THAN 5 YEARS  7/22/2022    IR TUNNELED CATHETER PLACEMENT GREATER THAN 5 YEARS 7/22/2022 [facility] PROCEDURES    VASECTOMY  2010    Dr. Zoë Cr         Past Medical History:   Diagnosis Date    Allergic rhinitis     GERD (gastroesophageal reflux disease)     HTN (hypertension) 9/26/2013    Hyperlipidemia     Lumbar spondylolysis     Multiple myeloma not having achieved remission ([facility] [address] 4/12/2022    PONV (postoperative nausea and vomiting)     Tight ring on finger     Vitamin D deficiency        Prior to Admission medications    Medication Sig Start Date End Date Taking? Authorizing Provider   dicyclomine (BENTYL) 10 MG capsule Take 1 capsule by mouth in the morning and 1 capsule at noon and 1 capsule in the evening and 1 capsule before bedtime. 8/11/22   Abigail Villarreal MD   pantoprazole (PROTONIX) 40 MG tablet Take 1 tablet by mouth in the morning and 1 tablet in the evening.  8/3/22 9/2/22  Allyssa Cough, APRN - CNP   atorvastatin (LIPITOR) 20 MG tablet Take 1 tablet by mouth every evening TAKE 1 TABLET DAILY 2/14/22   Jarocho Little MD   lisinopril (PRINIVIL;ZESTRIL) 20 MG tablet Take 1 tablet by mouth daily 2/14/22   Jarocho Little MD   Ascorbic Acid (VITAMIN C) 500 MG CAPS Take 1 capsule by mouth daily    Historical Provider, MD   Cholecalciferol (VITAMIN D PO) Take 2,000 Int'l Units by mouth daily     Historical Provider, MD       No Known Allergies    Family History   Problem Relation Age of Onset    Hypertension Mother     Stroke Mother     High Cholesterol Mother     Depression Mother     Anxiety Disorder Mother     High Cholesterol Father     Prostate Cancer Father 77        s/p prostatectomy, no other treatment needed    Other Maternal Grandfather         Clotting disorder    Crohn's Disease Other         Social History     Socioeconomic History    Marital status:      Spouse name: Not on file    Number of children: Not on file    Years of education: Not on file    Highest education level: Not on file   Occupational History    Not on file   Tobacco Use results for input(s): WBC, HGB, HCT, MCV, PLT in the last 72 hours. BMP/Mag:No results for input(s): NA, K, CL, CO2, PHOS, BUN, CREATININE, CA, MG in the last 72 hours. LIVP: No results for input(s): AST, ALT, LIPASE, BILIDIR, BILITOT, ALKPHOS in the last 72 hours. Invalid input(s): AMYLASE,  ALB  Coags: No results for input(s): PROTIME, INR, APTT in the last 72 hours. Uric Acid No results for input(s): LABURIC in the last 72 hours. PROBLEM LIST:            Multiple Myeloma  HTN  HLD  Positive H. pylori breath test      TREATMENT:            RVd on (4/11/22) x 4 cycles   [id number] & ASCT (8/30/22) w/ 4.84 x 10^6 CD34+ cells/kg    ASSESSMENT AND PLAN:            1. IgG lambda myeloma, ISS stage II: VGPR  - Bone marrow biopsy (3/10/2022): showed 70% cellularity with plasma cells estimated to account for between 30-50%. 46,XY[20]. Myeloma ROBIN panel showed three copies of CCND1 (11q13. 3) in 83% of cells, four copies of CCND1 (11q13. 3) in 5% of cells, three copies of MAF (16q23.2) in 54% of cells, and monosomy 13 in 9% of cells. - PET/CT, 3/09/2022, showed no evidence of hypermetabolism. - [id number] & ASCT (8/30/22) w/ 4.84 x 10^6 CD34+ cells/kg    Emend Hypersensitivity 8/29/22 including facial flushing, stomach cramping. D/C'ed and started PO Zyprexa 10 mg once 8/29/22 then 5 mg PO daily for 3 more days    Dr. Maddy Llamas has discussed the risks associated with transplant which include the risk of infection, bleeding and inability to obtain a long term remission. He understands these risks and is willing to proceed. The consent is signed and on the chart. Day - 1    2. ID:  Afebrile, no evidence of infection.    - Start Acyclovir 800 mg BID  - Start Diflucan prophylaxis on Day 0 (8/30/22)   - Start Levaquin when [address] < 1.5  - Hx positive H. pylori breath test w/ negative EGD & Colonoscopy Bx (8/18/22)     3. Heme:  Blood counts stable  - Transfuse for Hgb < 7 and Platelets < 41B  - No transfusion today     4. Metabolic:  Electrolytes are WNL and renal fxn stable. - Start IVF hydration: Will receive 2 L NS with chemo and ASCT then 1 L NS each day in OP Infusion  - Replace potassium and magnesium per policy. 5. Nutrition / GI:  Appetite and oral intake is good. - Start low microbial diet   - Follow closely with dietary  GI: no active issues  - Hx positive H. pylori breath test w/ negative EGD & Colonoscopy Bx (8/18/22)  - Zofran and Compazine PRN for nausea / vomiting      6. Pulmonary: No active issues   - DLCO of 75% of predicted finding on ASCT workup: No obvious etiology for this  - CXR 8/29/22: Negative for acute disease     7. Prevention of SRE:    - PET/CT was without bony disease. Can hold of on a bisphosphonate. 8. Peripheral Neuropathy:   - Cont Gabapentin 300 mg nighty (Consider increasing dose if symptoms persist)      - Disposition: Return to OP Infusion tomorrow for ASCT then daily for toxicity assessment, labs and MD visit. The patient was seen and examined by Dr. Kenneth España. This admission history and physical has been discussed and agreed upon by Dr. Kenneth España.     BRINDA Power - NP

## 2022-08-29 NOTE — PROGRESS NOTES
Use the following paragraph ONLY if checking orders for the first time, otherwise delete the following phrase in red (admission, new chemo orders)    Original chemotherapy orders reviewed and acknowledged. Appropriateness of chemotherapy treatment regimen melphalan for diagnosis of multiple myeloma was verified. Patient educated on chemotherapy regimen. Consent for chemotherapy obtained. Estimated body surface area is 1.71 meters squared as calculated from the following:    Height as of this encounter: 5' 6\" (1.676 m). Weight as of this encounter: 138 lb 0.1 oz (62.6 kg). verified. Appropriate dosing calculations of chemotherapy based on above height, weight, and BSA verified. Angie Clark RN8/29/2022  2:00 PM      Chemotherapy drug melphalan independently verified with Cristobal Whyte RN prior to administration. Informed consent for chemotherapy administration verified. Original order, appropriateness of regimen, drug supplied, height, weight, BSA, dose calculations, expiration dates/times, drug appearance, and two patient identifiers were verified by both RNs. Drug checked for vesicant/irritant status and for risk of hypersensitivity. Most recent laboratory values and allergies, were reviewed. Positive, brisk blood return via CVC was confirmed prior to administration. Chest x-ray for correct line placement reviewed. Angie Clark RN and Cristobal Whyte RN verified correct rate of chemotherapy and maintenance IV fluids. Patient had a hypersensitivity reaction to the pre medication Emend. MD notified. See MAR for new medication orders. Will continue to monitor. Monitoring during infusion done per policy, see DocFlowsheets. Blood return verified before and after infusion; no signs of extravasation. Pt tolerated chemotherapy well and without incident. Chemotherapy infusion end time on the [facility] Will continue to monitor.      Patient received melphalan today per chemotherapy preparative regimen as ordered. Prior to initiating melphalan infusion, patient was educated on the use of cryotherapy to prevent/decrease severe oral mucositis. Patient verbalized understanding of procedure and performed cryotherapy as instructed for 15 minutes prior to infusion, throughout duration of infusion, and for 2 hours post infusion using ice chips & popsicles. Pt tolerated well with no significant side effects.      Mich Can RN

## 2022-08-30 ENCOUNTER — HOSPITAL ENCOUNTER (OUTPATIENT)
Dept: ONCOLOGY | Age: 47
Setting detail: INFUSION SERIES
Discharge: HOME | End: 2022-08-30
Payer: COMMERCIAL

## 2022-08-30 VITALS
WEIGHT: 144.4 LBS | SYSTOLIC BLOOD PRESSURE: 103 MMHG | RESPIRATION RATE: 16 BRPM | OXYGEN SATURATION: 99 % | TEMPERATURE: 97.8 F | HEART RATE: 77 BPM | DIASTOLIC BLOOD PRESSURE: 68 MMHG | BODY MASS INDEX: 23.31 KG/M2

## 2022-08-30 DIAGNOSIS — C90.00 MULTIPLE MYELOMA NOT HAVING ACHIEVED REMISSION (HCC): Primary | ICD-10-CM

## 2022-08-30 LAB
ANION GAP SERPL CALCULATED.3IONS-SCNC: 8 MMOL/L (ref 3–16)
BASOPHILS ABSOLUTE: 0.1 K/UL (ref 0–0.2)
BASOPHILS RELATIVE PERCENT: 0.6 %
BUN BLDV-MCNC: 15 MG/DL (ref 7–20)
CALCIUM SERPL-MCNC: 8.6 MG/DL (ref 8.3–10.6)
CHLORIDE BLD-SCNC: 103 MMOL/L (ref 99–110)
CO2: 26 MMOL/L (ref 21–32)
CREAT SERPL-MCNC: 0.8 MG/DL (ref 0.9–1.3)
EOSINOPHILS ABSOLUTE: 0 K/UL (ref 0–0.6)
EOSINOPHILS RELATIVE PERCENT: 0.1 %
GFR AFRICAN AMERICAN: >60
GFR NON-AFRICAN AMERICAN: >60
GLUCOSE BLD-MCNC: 119 MG/DL (ref 70–99)
HCT VFR BLD CALC: 37.4 % (ref 40.5–52.5)
HEMOGLOBIN: 12.9 G/DL (ref 13.5–17.5)
LYMPHOCYTES ABSOLUTE: 0.2 K/UL (ref 1–5.1)
LYMPHOCYTES RELATIVE PERCENT: 2.2 %
MCH RBC QN AUTO: 32 PG (ref 26–34)
MCHC RBC AUTO-ENTMCNC: 34.4 G/DL (ref 31–36)
MCV RBC AUTO: 92.9 FL (ref 80–100)
MONOCYTES ABSOLUTE: 0.6 K/UL (ref 0–1.3)
MONOCYTES RELATIVE PERCENT: 6.9 %
NEUTROPHILS ABSOLUTE: 8.5 K/UL (ref 1.7–7.7)
NEUTROPHILS RELATIVE PERCENT: 90.2 %
PDW BLD-RTO: 13.1 % (ref 12.4–15.4)
PHOSPHORUS: 3.3 MG/DL (ref 2.5–4.9)
PLATELET # BLD: 114 K/UL (ref 135–450)
PMV BLD AUTO: 8.3 FL (ref 5–10.5)
POTASSIUM SERPL-SCNC: 4 MMOL/L (ref 3.5–5.1)
RBC # BLD: 4.03 M/UL (ref 4.2–5.9)
SODIUM BLD-SCNC: 137 MMOL/L (ref 136–145)
WBC # BLD: 9.4 K/UL (ref 4–11)

## 2022-08-30 PROCEDURE — 36592 COLLECT BLOOD FROM PICC: CPT

## 2022-08-30 PROCEDURE — 99211 OFF/OP EST MAY X REQ PHY/QHP: CPT

## 2022-08-30 PROCEDURE — 80048 BASIC METABOLIC PNL TOTAL CA: CPT

## 2022-08-30 PROCEDURE — 96361 HYDRATE IV INFUSION ADD-ON: CPT

## 2022-08-30 PROCEDURE — 96360 HYDRATION IV INFUSION INIT: CPT

## 2022-08-30 PROCEDURE — 6360000002 HC RX W HCPCS: Performed by: INTERNAL MEDICINE

## 2022-08-30 PROCEDURE — 85025 COMPLETE CBC W/AUTO DIFF WBC: CPT

## 2022-08-30 PROCEDURE — 6370000000 HC RX 637 (ALT 250 FOR IP): Performed by: NURSE PRACTITIONER

## 2022-08-30 PROCEDURE — 38241 TRANSPLT AUTOL HCT/DONOR: CPT

## 2022-08-30 PROCEDURE — 96374 THER/PROPH/DIAG INJ IV PUSH: CPT

## 2022-08-30 PROCEDURE — 2580000003 HC RX 258: Performed by: NURSE PRACTITIONER

## 2022-08-30 PROCEDURE — 38208 THAW PRESERVED STEM CELLS: CPT

## 2022-08-30 PROCEDURE — 84100 ASSAY OF PHOSPHORUS: CPT

## 2022-08-30 PROCEDURE — 0540T HC CAR-T THERAPY AUTOLOGOUS CELL ADMIN: CPT

## 2022-08-30 PROCEDURE — 6360000002 HC RX W HCPCS: Performed by: NURSE PRACTITIONER

## 2022-08-30 PROCEDURE — 96375 TX/PRO/DX INJ NEW DRUG ADDON: CPT

## 2022-08-30 RX ORDER — ONDANSETRON 2 MG/ML
8 INJECTION INTRAMUSCULAR; INTRAVENOUS EVERY 8 HOURS PRN
Status: DISCONTINUED | OUTPATIENT
Start: 2022-08-30 | End: 2022-08-31 | Stop reason: HOSPADM

## 2022-08-30 RX ORDER — DIMETHICONE, CAMPHOR (SYNTHETIC), MENTHOL, AND PHENOL 1.1; .5; .625; .5 G/100G; G/100G; G/100G; G/100G
OINTMENT TOPICAL PRN
Status: DISCONTINUED | OUTPATIENT
Start: 2022-08-30 | End: 2022-08-31 | Stop reason: HOSPADM

## 2022-08-30 RX ORDER — POTASSIUM CHLORIDE 29.8 MG/ML
20 INJECTION INTRAVENOUS PRN
Status: DISCONTINUED | OUTPATIENT
Start: 2022-08-30 | End: 2022-08-31 | Stop reason: HOSPADM

## 2022-08-30 RX ORDER — SODIUM CHLORIDE 9 MG/ML
INJECTION, SOLUTION INTRAVENOUS CONTINUOUS PRN
Status: CANCELLED | OUTPATIENT
Start: 2022-08-31

## 2022-08-30 RX ORDER — 0.9 % SODIUM CHLORIDE 0.9 %
1000 INTRAVENOUS SOLUTION INTRAVENOUS ONCE
Status: DISCONTINUED | OUTPATIENT
Start: 2022-08-30 | End: 2022-08-31 | Stop reason: HOSPADM

## 2022-08-30 RX ORDER — SODIUM CHLORIDE 9 MG/ML
INJECTION, SOLUTION INTRAVENOUS CONTINUOUS PRN
Status: DISCONTINUED | OUTPATIENT
Start: 2022-08-30 | End: 2022-08-31 | Stop reason: HOSPADM

## 2022-08-30 RX ORDER — ONDANSETRON 4 MG/1
8 TABLET, FILM COATED ORAL EVERY 8 HOURS PRN
Status: CANCELLED | OUTPATIENT
Start: 2022-08-31

## 2022-08-30 RX ORDER — PROCHLORPERAZINE MALEATE 10 MG
10 TABLET ORAL EVERY 6 HOURS PRN
Status: CANCELLED | OUTPATIENT
Start: 2022-08-31

## 2022-08-30 RX ORDER — ONDANSETRON 2 MG/ML
8 INJECTION INTRAMUSCULAR; INTRAVENOUS EVERY 8 HOURS PRN
Status: CANCELLED | OUTPATIENT
Start: 2022-08-31

## 2022-08-30 RX ORDER — ACETAMINOPHEN 325 MG/1
650 TABLET ORAL ONCE
Status: COMPLETED | OUTPATIENT
Start: 2022-08-30 | End: 2022-08-30

## 2022-08-30 RX ORDER — ONDANSETRON 4 MG/1
8 TABLET, FILM COATED ORAL EVERY 8 HOURS PRN
Status: DISCONTINUED | OUTPATIENT
Start: 2022-08-30 | End: 2022-08-31 | Stop reason: HOSPADM

## 2022-08-30 RX ORDER — PROCHLORPERAZINE EDISYLATE 5 MG/ML
10 INJECTION INTRAMUSCULAR; INTRAVENOUS EVERY 6 HOURS PRN
Status: CANCELLED | OUTPATIENT
Start: 2022-08-31

## 2022-08-30 RX ORDER — MAGNESIUM SULFATE IN WATER 40 MG/ML
4000 INJECTION, SOLUTION INTRAVENOUS PRN
Status: CANCELLED | OUTPATIENT
Start: 2022-08-31

## 2022-08-30 RX ORDER — PROCHLORPERAZINE EDISYLATE 5 MG/ML
10 INJECTION INTRAMUSCULAR; INTRAVENOUS EVERY 6 HOURS PRN
Status: DISCONTINUED | OUTPATIENT
Start: 2022-08-30 | End: 2022-08-31 | Stop reason: HOSPADM

## 2022-08-30 RX ORDER — POTASSIUM CHLORIDE 29.8 MG/ML
80 INJECTION INTRAVENOUS PRN
Status: CANCELLED | OUTPATIENT
Start: 2022-08-31

## 2022-08-30 RX ORDER — HEPARIN SODIUM (PORCINE) LOCK FLUSH IV SOLN 100 UNIT/ML 100 UNIT/ML
500 SOLUTION INTRAVENOUS PRN
Status: CANCELLED | OUTPATIENT
Start: 2022-08-31

## 2022-08-30 RX ORDER — DIPHENHYDRAMINE HYDROCHLORIDE 50 MG/ML
25 INJECTION INTRAMUSCULAR; INTRAVENOUS PRN
Status: DISCONTINUED | OUTPATIENT
Start: 2022-08-30 | End: 2022-08-31 | Stop reason: HOSPADM

## 2022-08-30 RX ORDER — PROCHLORPERAZINE MALEATE 10 MG
10 TABLET ORAL EVERY 6 HOURS PRN
Status: DISCONTINUED | OUTPATIENT
Start: 2022-08-30 | End: 2022-08-31 | Stop reason: HOSPADM

## 2022-08-30 RX ORDER — PETROLATUM, MENTHOL, UNSPECIFIED FORM, CAMPHOR (SYNTHETIC), AND PHENOL 59.14; 1; 1; .6 G/100G; G/100G; G/100G; G/100G
PASTE TOPICAL PRN
Status: CANCELLED | OUTPATIENT
Start: 2022-08-31

## 2022-08-30 RX ORDER — 0.9 % SODIUM CHLORIDE 0.9 %
1000 INTRAVENOUS SOLUTION INTRAVENOUS ONCE
Status: COMPLETED | OUTPATIENT
Start: 2022-08-30 | End: 2022-08-30

## 2022-08-30 RX ORDER — OXYCODONE HYDROCHLORIDE 5 MG/1
5 TABLET ORAL EVERY 4 HOURS PRN
Status: CANCELLED | OUTPATIENT
Start: 2022-08-31

## 2022-08-30 RX ORDER — OXYCODONE HYDROCHLORIDE 5 MG/1
10 TABLET ORAL EVERY 4 HOURS PRN
Status: CANCELLED | OUTPATIENT
Start: 2022-08-31

## 2022-08-30 RX ORDER — 0.9 % SODIUM CHLORIDE 0.9 %
1000 INTRAVENOUS SOLUTION INTRAVENOUS ONCE
Status: CANCELLED | OUTPATIENT
Start: 2022-08-31 | End: 2022-08-31

## 2022-08-30 RX ORDER — HEPARIN SODIUM (PORCINE) LOCK FLUSH IV SOLN 100 UNIT/ML 100 UNIT/ML
500 SOLUTION INTRAVENOUS PRN
Status: DISCONTINUED | OUTPATIENT
Start: 2022-08-30 | End: 2022-08-31 | Stop reason: HOSPADM

## 2022-08-30 RX ORDER — OXYCODONE HYDROCHLORIDE 5 MG/1
10 TABLET ORAL EVERY 4 HOURS PRN
Status: DISCONTINUED | OUTPATIENT
Start: 2022-08-30 | End: 2022-08-31 | Stop reason: HOSPADM

## 2022-08-30 RX ORDER — POTASSIUM CHLORIDE 20 MEQ/1
40 TABLET, EXTENDED RELEASE ORAL PRN
Status: DISCONTINUED | OUTPATIENT
Start: 2022-08-30 | End: 2022-08-31 | Stop reason: HOSPADM

## 2022-08-30 RX ORDER — POTASSIUM CHLORIDE 20 MEQ/1
40 TABLET, EXTENDED RELEASE ORAL PRN
Status: CANCELLED | OUTPATIENT
Start: 2022-08-31

## 2022-08-30 RX ORDER — MORPHINE SULFATE 2 MG/ML
2 INJECTION, SOLUTION INTRAMUSCULAR; INTRAVENOUS PRN
Status: DISCONTINUED | OUTPATIENT
Start: 2022-08-30 | End: 2022-08-31 | Stop reason: HOSPADM

## 2022-08-30 RX ORDER — OXYCODONE HYDROCHLORIDE 5 MG/1
5 TABLET ORAL EVERY 4 HOURS PRN
Status: DISCONTINUED | OUTPATIENT
Start: 2022-08-30 | End: 2022-08-31 | Stop reason: HOSPADM

## 2022-08-30 RX ORDER — MAGNESIUM SULFATE IN WATER 40 MG/ML
4000 INJECTION, SOLUTION INTRAVENOUS PRN
Status: DISCONTINUED | OUTPATIENT
Start: 2022-08-30 | End: 2022-08-31 | Stop reason: HOSPADM

## 2022-08-30 RX ORDER — DIPHENHYDRAMINE HCL 25 MG
25 TABLET ORAL ONCE
Status: COMPLETED | OUTPATIENT
Start: 2022-08-30 | End: 2022-08-30

## 2022-08-30 RX ADMIN — ONDANSETRON HYDROCHLORIDE 8 MG: 2 INJECTION, SOLUTION INTRAMUSCULAR; INTRAVENOUS at 12:36

## 2022-08-30 RX ADMIN — HYDROCORTISONE SODIUM SUCCINATE 100 MG: 100 INJECTION, POWDER, FOR SOLUTION INTRAMUSCULAR; INTRAVENOUS at 10:31

## 2022-08-30 RX ADMIN — ACETAMINOPHEN 650 MG: 325 TABLET ORAL at 10:31

## 2022-08-30 RX ADMIN — SODIUM CHLORIDE, PRESERVATIVE FREE 500 UNITS: 5 INJECTION INTRAVENOUS at 14:11

## 2022-08-30 RX ADMIN — SODIUM CHLORIDE 1000 ML: 9 INJECTION, SOLUTION INTRAVENOUS at 12:08

## 2022-08-30 RX ADMIN — SODIUM CHLORIDE, PRESERVATIVE FREE 500 UNITS: 5 INJECTION INTRAVENOUS at 14:09

## 2022-08-30 RX ADMIN — DIPHENHYDRAMINE HCL 25 MG: 25 TABLET ORAL at 10:31

## 2022-08-30 RX ADMIN — SODIUM CHLORIDE 1000 ML: 9 INJECTION, SOLUTION INTRAVENOUS at 08:23

## 2022-08-30 NOTE — PROGRESS NOTES
[facility]  Autologous Progress Note    2022    Solis Barrios    :  1975    MRN:  [id number]    Referring MD: Leonel Mejia MD  [address] [facility] [address] [facility],  [address]      Subjective:  Feels well today, tolerated chemotherapy well. Mild nausea this morning that Zofran relieved. Patient eating and drinking well, will receive his ASCT today. ECOG PS:  (1) Restricted in physically strenuous activity, ambulatory and able to do work of light nature    KPS: 80% Normal activity with effort; some signs or symptoms of disease    Isolation:  None     Medications    Scheduled Meds:   hydrocortisone sodium succinate PF  100 mg IntraVENous Once    diphenhydramine  25 mg Oral Once    acetaminophen  650 mg Oral Once    sodium chloride  1,000 mL IntraVENous Once    sodium chloride  1,000 mL IntraVENous Once    alteplase (CATHFLO) with sterile water injection  2 mg IntraCATHeter Once     Continuous Infusions:   sodium chloride      sodium chloride       PRN Meds:.diphenhydrAMINE, hydrocortisone sodium succinate PF, EPINEPHrine, morphine, sodium chloride, sodium chloride, sodium chloride, potassium chloride, potassium chloride, magnesium sulfate, magnesium hydroxide, medicated lip ointment, oxyCODONE, oxyCODONE, prochlorperazine, prochlorperazine, ondansetron, ondansetron, heparin flush    ROS:  As noted above, otherwise remainder of 10-point ROS negative    Physical Exam:     I&O:  No intake or output data in the 24 hours ending 22 1025    Vital Signs:  /61   Pulse 77   Temp 98.4 °F (36.9 °C) (Oral)   Resp 16   Wt 144 lb 6.4 oz (65.5 kg)   SpO2 98%   BMI 23.31 kg/m²     Weight:    Wt Readings from Last 3 Encounters:   22 144 lb 6.4 oz (65.5 kg)   22 138 lb 0.1 oz (62.6 kg)   08/15/22 140 lb (63.5 kg)       General: Awake, alert and oriented.   HEENT: normocephalic, alopecia, PERRL, no scleral erythema or icterus, Oral mucosa moist and intact, throat with transplant which include the risk of infection, bleeding and inability to obtain a long term remission. He understands these risks and is willing to proceed. The consent is signed and on the chart. Day 0     2. ID:  Afebrile, no evidence of infection.    - Start Acyclovir 800 mg BID  - Start Diflucan prophylaxis on Day 0 (8/30/22)   - Start Levaquin when [address] < 1.5  - Hx positive H. pylori breath test w/ negative EGD & Colonoscopy Bx (8/18/22)      3. Heme: Anemia & thrombocytopenia d/t recent chemotherapy    - Transfuse for Hgb < 7 and Platelets < 77A  - No transfusion today     4. Metabolic: Mild Hyperglycemia and renal fxn stable. - Start IVF hydration: Will receive 2 L NS with chemo and ASCT then 1 L NS each day in OP Infusion  - Replace potassium and magnesium per policy. 5. Nutrition / GI:  Appetite and oral intake is good. - Start low microbial diet   - Follow closely with dietary  GI: no active issues  - Hx positive H. pylori breath test w/ negative EGD & Colonoscopy Bx (8/18/22)  - Zofran and Compazine PRN for nausea / vomiting      6. Pulmonary: No active issues   - DLCO of 75% of predicted finding on ASCT workup: No obvious etiology for this  - CXR 8/29/22: Negative for acute disease     7. Prevention of SRE:    - PET/CT was without bony disease. Can hold of on a bisphosphonate. 8. Peripheral Neuropathy:   - Cont Gabapentin 300 mg nighty (Consider increasing dose if symptoms persist)      - Disposition: Return to OP Infusion daily for toxicity assessment, labs and MD visit. The patient was seen and examined by Dr. Kirby Clayton.      BRINDA Orantes - NP

## 2022-08-30 NOTE — PROCEDURES
Transplant (T0) Progress Note      Joanie Cortes                           Blood Type: B pos    [unfilled]                           Start time:1128 Completion [id number]    Transplant Type: Autologous    Product Type: PBSC (peripheral blood stem cell)    Product Unit Number: [id number]    Cell Count: 4.84  x 10^6  Volume: 280 mL      Product Manipulation:      Volume Reduction  No  Plasma Depletion  No  RBC Depletion  No    Catheter lumen used for infusion: RED             Positive Blood Return: Yes    Donor Name: n/a                                     Blood Type: B pos    Relationship: SELF                          Donor Sex: male    Premeds Given: YES PER ORDERS    Adverse Reaction(s) and treatment: Baseline vital signs obtained prior to infusion and monitoring completed throughout per [employer] protocol - see flowsheets. All IVFs turned down to [facility] during stem cell infusion. Stem cell product(s) verified with 2nd RN Judy Whyte prior to infusion using 2 patient identifiers. Infused via gravity with rate controlled by Amy Bonilla RN per [employer] protocols. Emergency medications epinephrine, benadryl, & hydrocortisone available as needed. Emergency medical equipment available as needed including telemetry monitoring throughout infusion, supplemental O2, suction equipment, and crash cart. RN at bedside throughout infusion.    Raul Sy RN

## 2022-08-31 ENCOUNTER — HOSPITAL ENCOUNTER (OUTPATIENT)
Dept: ONCOLOGY | Age: 47
Setting detail: INFUSION SERIES
Discharge: HOME | End: 2022-08-31
Payer: COMMERCIAL

## 2022-08-31 VITALS
SYSTOLIC BLOOD PRESSURE: 99 MMHG | RESPIRATION RATE: 16 BRPM | BODY MASS INDEX: 23.88 KG/M2 | DIASTOLIC BLOOD PRESSURE: 61 MMHG | TEMPERATURE: 97.6 F | WEIGHT: 147.93 LBS | HEART RATE: 72 BPM | OXYGEN SATURATION: 98 %

## 2022-08-31 DIAGNOSIS — C90.00 MULTIPLE MYELOMA NOT HAVING ACHIEVED REMISSION (HCC): Primary | ICD-10-CM

## 2022-08-31 LAB
ALBUMIN SERPL-MCNC: 3.8 G/DL (ref 3.4–5)
ALP BLD-CCNC: 62 U/L (ref 40–129)
ALT SERPL-CCNC: 11 U/L (ref 10–40)
ANION GAP SERPL CALCULATED.3IONS-SCNC: 5 MMOL/L (ref 3–16)
AST SERPL-CCNC: 15 U/L (ref 15–37)
BASOPHILS ABSOLUTE: 0 K/UL (ref 0–0.2)
BASOPHILS RELATIVE PERCENT: 0.1 %
BILIRUB SERPL-MCNC: 0.5 MG/DL (ref 0–1)
BILIRUBIN DIRECT: <0.2 MG/DL (ref 0–0.3)
BILIRUBIN, INDIRECT: ABNORMAL MG/DL (ref 0–1)
BUN BLDV-MCNC: 13 MG/DL (ref 7–20)
CALCIUM SERPL-MCNC: 8.5 MG/DL (ref 8.3–10.6)
CHLORIDE BLD-SCNC: 104 MMOL/L (ref 99–110)
CO2: 28 MMOL/L (ref 21–32)
CREAT SERPL-MCNC: 0.7 MG/DL (ref 0.9–1.3)
EOSINOPHILS ABSOLUTE: 0 K/UL (ref 0–0.6)
EOSINOPHILS RELATIVE PERCENT: 0.1 %
GFR AFRICAN AMERICAN: >60
GFR NON-AFRICAN AMERICAN: >60
GLUCOSE BLD-MCNC: 83 MG/DL (ref 70–99)
HCT VFR BLD CALC: 35.6 % (ref 40.5–52.5)
HEMOGLOBIN: 12.1 G/DL (ref 13.5–17.5)
LACTATE DEHYDROGENASE: 217 U/L (ref 100–190)
LYMPHOCYTES ABSOLUTE: 0.2 K/UL (ref 1–5.1)
LYMPHOCYTES RELATIVE PERCENT: 3.9 %
MAGNESIUM: 1.7 MG/DL (ref 1.8–2.4)
MCH RBC QN AUTO: 31.9 PG (ref 26–34)
MCHC RBC AUTO-ENTMCNC: 34.1 G/DL (ref 31–36)
MCV RBC AUTO: 93.8 FL (ref 80–100)
MONOCYTES ABSOLUTE: 0.3 K/UL (ref 0–1.3)
MONOCYTES RELATIVE PERCENT: 4.4 %
NEUTROPHILS ABSOLUTE: 5.4 K/UL (ref 1.7–7.7)
NEUTROPHILS RELATIVE PERCENT: 91.5 %
PDW BLD-RTO: 12.9 % (ref 12.4–15.4)
PHOSPHORUS: 2.6 MG/DL (ref 2.5–4.9)
PLATELET # BLD: 95 K/UL (ref 135–450)
PMV BLD AUTO: 8.8 FL (ref 5–10.5)
POTASSIUM SERPL-SCNC: 3.7 MMOL/L (ref 3.5–5.1)
RBC # BLD: 3.79 M/UL (ref 4.2–5.9)
SODIUM BLD-SCNC: 137 MMOL/L (ref 136–145)
TOTAL PROTEIN: 5.4 G/DL (ref 6.4–8.2)
URIC ACID, SERUM: 2.4 MG/DL (ref 3.5–7.2)
WBC # BLD: 5.9 K/UL (ref 4–11)

## 2022-08-31 PROCEDURE — 6360000002 HC RX W HCPCS: Performed by: INTERNAL MEDICINE

## 2022-08-31 PROCEDURE — 80076 HEPATIC FUNCTION PANEL: CPT

## 2022-08-31 PROCEDURE — 83615 LACTATE (LD) (LDH) ENZYME: CPT

## 2022-08-31 PROCEDURE — 96361 HYDRATE IV INFUSION ADD-ON: CPT

## 2022-08-31 PROCEDURE — 80048 BASIC METABOLIC PNL TOTAL CA: CPT

## 2022-08-31 PROCEDURE — 2580000003 HC RX 258: Performed by: INTERNAL MEDICINE

## 2022-08-31 PROCEDURE — 84550 ASSAY OF BLOOD/URIC ACID: CPT

## 2022-08-31 PROCEDURE — 85025 COMPLETE CBC W/AUTO DIFF WBC: CPT

## 2022-08-31 PROCEDURE — 84100 ASSAY OF PHOSPHORUS: CPT

## 2022-08-31 PROCEDURE — 96360 HYDRATION IV INFUSION INIT: CPT

## 2022-08-31 PROCEDURE — 83735 ASSAY OF MAGNESIUM: CPT

## 2022-08-31 PROCEDURE — 36592 COLLECT BLOOD FROM PICC: CPT

## 2022-08-31 RX ORDER — POTASSIUM CHLORIDE 20 MEQ/1
40 TABLET, EXTENDED RELEASE ORAL PRN
Status: CANCELLED | OUTPATIENT
Start: 2022-09-01

## 2022-08-31 RX ORDER — PROCHLORPERAZINE EDISYLATE 5 MG/ML
10 INJECTION INTRAMUSCULAR; INTRAVENOUS EVERY 6 HOURS PRN
Status: CANCELLED | OUTPATIENT
Start: 2022-09-01

## 2022-08-31 RX ORDER — POTASSIUM CHLORIDE 20 MEQ/1
40 TABLET, EXTENDED RELEASE ORAL PRN
Status: DISCONTINUED | OUTPATIENT
Start: 2022-08-31 | End: 2022-09-01 | Stop reason: HOSPADM

## 2022-08-31 RX ORDER — OXYCODONE HYDROCHLORIDE 5 MG/1
5 TABLET ORAL EVERY 4 HOURS PRN
Status: CANCELLED | OUTPATIENT
Start: 2022-09-01

## 2022-08-31 RX ORDER — HEPARIN SODIUM (PORCINE) LOCK FLUSH IV SOLN 100 UNIT/ML 100 UNIT/ML
500 SOLUTION INTRAVENOUS PRN
Status: DISCONTINUED | OUTPATIENT
Start: 2022-08-31 | End: 2022-09-01 | Stop reason: HOSPADM

## 2022-08-31 RX ORDER — ONDANSETRON 2 MG/ML
8 INJECTION INTRAMUSCULAR; INTRAVENOUS EVERY 8 HOURS PRN
Status: CANCELLED | OUTPATIENT
Start: 2022-09-01

## 2022-08-31 RX ORDER — DIMETHICONE, CAMPHOR (SYNTHETIC), MENTHOL, AND PHENOL 1.1; .5; .625; .5 G/100G; G/100G; G/100G; G/100G
OINTMENT TOPICAL PRN
Status: DISCONTINUED | OUTPATIENT
Start: 2022-08-31 | End: 2022-09-01 | Stop reason: HOSPADM

## 2022-08-31 RX ORDER — SODIUM CHLORIDE 9 MG/ML
INJECTION, SOLUTION INTRAVENOUS CONTINUOUS PRN
Status: CANCELLED | OUTPATIENT
Start: 2022-09-01

## 2022-08-31 RX ORDER — PROCHLORPERAZINE MALEATE 10 MG
10 TABLET ORAL EVERY 6 HOURS PRN
Status: CANCELLED | OUTPATIENT
Start: 2022-09-01

## 2022-08-31 RX ORDER — ONDANSETRON 2 MG/ML
8 INJECTION INTRAMUSCULAR; INTRAVENOUS EVERY 8 HOURS PRN
Status: DISCONTINUED | OUTPATIENT
Start: 2022-08-31 | End: 2022-09-01 | Stop reason: HOSPADM

## 2022-08-31 RX ORDER — POTASSIUM CHLORIDE 29.8 MG/ML
80 INJECTION INTRAVENOUS PRN
Status: CANCELLED | OUTPATIENT
Start: 2022-09-01

## 2022-08-31 RX ORDER — ONDANSETRON 4 MG/1
8 TABLET, FILM COATED ORAL EVERY 8 HOURS PRN
Status: DISCONTINUED | OUTPATIENT
Start: 2022-08-31 | End: 2022-09-01 | Stop reason: HOSPADM

## 2022-08-31 RX ORDER — OXYCODONE HYDROCHLORIDE 5 MG/1
5 TABLET ORAL EVERY 4 HOURS PRN
Status: DISCONTINUED | OUTPATIENT
Start: 2022-08-31 | End: 2022-09-01 | Stop reason: HOSPADM

## 2022-08-31 RX ORDER — HEPARIN SODIUM (PORCINE) LOCK FLUSH IV SOLN 100 UNIT/ML 100 UNIT/ML
500 SOLUTION INTRAVENOUS PRN
Status: CANCELLED | OUTPATIENT
Start: 2022-09-01

## 2022-08-31 RX ORDER — ONDANSETRON HYDROCHLORIDE 8 MG/1
8 TABLET, FILM COATED ORAL EVERY 8 HOURS PRN
Qty: 30 TABLET | Refills: 2 | Status: SHIPPED | OUTPATIENT
Start: 2022-08-31 | End: 2022-09-03 | Stop reason: SDUPTHER

## 2022-08-31 RX ORDER — PROCHLORPERAZINE MALEATE 10 MG
10 TABLET ORAL EVERY 6 HOURS PRN
Status: DISCONTINUED | OUTPATIENT
Start: 2022-08-31 | End: 2022-09-01 | Stop reason: HOSPADM

## 2022-08-31 RX ORDER — OXYCODONE HYDROCHLORIDE 5 MG/1
10 TABLET ORAL EVERY 4 HOURS PRN
Status: CANCELLED | OUTPATIENT
Start: 2022-09-01

## 2022-08-31 RX ORDER — POTASSIUM CHLORIDE 29.8 MG/ML
20 INJECTION INTRAVENOUS PRN
Status: DISCONTINUED | OUTPATIENT
Start: 2022-08-31 | End: 2022-09-01 | Stop reason: HOSPADM

## 2022-08-31 RX ORDER — MAGNESIUM SULFATE IN WATER 40 MG/ML
4000 INJECTION, SOLUTION INTRAVENOUS PRN
Status: DISCONTINUED | OUTPATIENT
Start: 2022-08-31 | End: 2022-09-01 | Stop reason: HOSPADM

## 2022-08-31 RX ORDER — MAGNESIUM SULFATE IN WATER 40 MG/ML
4000 INJECTION, SOLUTION INTRAVENOUS PRN
Status: CANCELLED | OUTPATIENT
Start: 2022-09-01

## 2022-08-31 RX ORDER — SODIUM CHLORIDE 9 MG/ML
INJECTION, SOLUTION INTRAVENOUS CONTINUOUS PRN
Status: DISCONTINUED | OUTPATIENT
Start: 2022-08-31 | End: 2022-09-01 | Stop reason: HOSPADM

## 2022-08-31 RX ORDER — PETROLATUM, MENTHOL, UNSPECIFIED FORM, CAMPHOR (SYNTHETIC), AND PHENOL 59.14; 1; 1; .6 G/100G; G/100G; G/100G; G/100G
PASTE TOPICAL PRN
Status: CANCELLED | OUTPATIENT
Start: 2022-09-01

## 2022-08-31 RX ORDER — 0.9 % SODIUM CHLORIDE 0.9 %
1000 INTRAVENOUS SOLUTION INTRAVENOUS ONCE
Status: COMPLETED | OUTPATIENT
Start: 2022-08-31 | End: 2022-08-31

## 2022-08-31 RX ORDER — PROCHLORPERAZINE EDISYLATE 5 MG/ML
10 INJECTION INTRAMUSCULAR; INTRAVENOUS EVERY 6 HOURS PRN
Status: DISCONTINUED | OUTPATIENT
Start: 2022-08-31 | End: 2022-09-01 | Stop reason: HOSPADM

## 2022-08-31 RX ORDER — ONDANSETRON 4 MG/1
8 TABLET, FILM COATED ORAL EVERY 8 HOURS PRN
Status: CANCELLED | OUTPATIENT
Start: 2022-09-01

## 2022-08-31 RX ORDER — 0.9 % SODIUM CHLORIDE 0.9 %
1000 INTRAVENOUS SOLUTION INTRAVENOUS ONCE
Status: CANCELLED | OUTPATIENT
Start: 2022-09-01 | End: 2022-09-01

## 2022-08-31 RX ORDER — OXYCODONE HYDROCHLORIDE 5 MG/1
10 TABLET ORAL EVERY 4 HOURS PRN
Status: DISCONTINUED | OUTPATIENT
Start: 2022-08-31 | End: 2022-09-01 | Stop reason: HOSPADM

## 2022-08-31 RX ADMIN — SODIUM CHLORIDE, PRESERVATIVE FREE 500 UNITS: 5 INJECTION INTRAVENOUS at 10:49

## 2022-08-31 RX ADMIN — SODIUM CHLORIDE 1000 ML: 9 INJECTION, SOLUTION INTRAVENOUS at 08:41

## 2022-08-31 RX ADMIN — ONDANSETRON HYDROCHLORIDE 8 MG: 2 INJECTION, SOLUTION INTRAMUSCULAR; INTRAVENOUS at 10:49

## 2022-08-31 NOTE — PROGRESS NOTES
[address] [facility]  Autologous Progress Note    2022    Cheryle Shearer    :  1975    MRN:  [id number]    Referring MD: Rashel Ignacio, [address] [facility],  [address]      Subjective:  Patient complains of nausea, no vomiting, will schedule antiemetics, no fevers, still eating and drinking well. ECOG PS:  (1) Restricted in physically strenuous activity, ambulatory and able to do work of light nature    KPS: 80% Normal activity with effort; some signs or symptoms of disease    Isolation:  None     Medications    Scheduled Meds:   sodium chloride  1,000 mL IntraVENous Once    alteplase (CATHFLO) with sterile water injection  2 mg IntraCATHeter Once     Continuous Infusions:   sodium chloride       PRN Meds:.sodium chloride, potassium chloride, potassium chloride, magnesium sulfate, magnesium hydroxide, lip balm, oxyCODONE, oxyCODONE, prochlorperazine, prochlorperazine, ondansetron, ondansetron, heparin flush    ROS:  As noted above, otherwise remainder of 10-point ROS negative    Physical Exam:     I&O:  No intake or output data in the 24 hours ending 22 0831    Vital Signs: There were no vitals taken for this visit. Weight:    Wt Readings from Last 3 Encounters:   22 144 lb 6.4 oz (65.5 kg)   22 138 lb 0.1 oz (62.6 kg)   08/15/22 140 lb (63.5 kg)       General: Awake, alert and oriented.   HEENT: normocephalic, alopecia, PERRL, no scleral erythema or icterus, Oral mucosa moist and intact, throat clear  NECK: supple without palpable adenopathy  BACK: Straight negative CVAT  SKIN: warm dry and intact without lesions rashes or masses  CHEST: CTA bilaterally without use of accessory muscles  CV: Normal S1 S2, RRR, no MRG  ABD: NT ND normoactive BS, no palpable masses or hepatosplenomegaly  EXTREMITIES: without edema, denies calf tenderness  NEURO: CN II - XII grossly intact  CATHETER: Right Subclavian Trifusion (IR: Dianne, 22): CDI      Data:   CBC: Recent Labs     08/29/22  0855 08/30/22  0807   WBC 4.4 9.4   HGB 14.6 12.9*   HCT 43.7 37.4*   MCV 93.7 92.9   * 114*       BMP/Mag:  Recent Labs     08/29/22  0855 08/30/22  0807    137   K 4.3 4.0    103   CO2 28 26   PHOS 3.6 3.3   BUN 12 15   CREATININE 0.7* 0.8*   MG 1.90  --        LIVP:   Recent Labs     08/29/22  0855   AST 15   ALT 12   BILIDIR <0.2   BILITOT 0.7   ALKPHOS 86       Uric Acid:    Recent Labs     08/29/22  0855   LABURIC 2.6*       Coags:   Recent Labs     08/29/22  0855   PROTIME 13.0   INR 0.99           PROBLEM LIST:            Multiple Myeloma  HTN  HLD  Positive H. pylori breath test      TREATMENT:            RVd on (4/11/22) x 4 cycles   [id number] & ASCT (8/30/22) w/ 4.84 x 10^6 CD34+ cells/kg     ASSESSMENT AND PLAN:            1. IgG lambda myeloma, ISS stage II: VGPR  - Bone marrow biopsy (3/10/2022): showed 70% cellularity with plasma cells estimated to account for between 30-50%. 46,XY[20]. Myeloma ROBIN panel showed three copies of CCND1 (11q13. 3) in 83% of cells, four copies of CCND1 (11q13. 3) in 5% of cells, three copies of MAF (16q23.2) in 54% of cells, and monosomy 13 in 9% of cells. - PET/CT, 3/09/2022, showed no evidence of hypermetabolism. - [id number] & ASCT (8/30/22) w/ 4.84 x 10^6 CD34+ cells/kg     Emend Hypersensitivity 8/29/22 including facial flushing, stomach cramping. D/C'ed and started PO Zyprexa 10 mg once 8/29/22 then 5 mg PO daily for 3 more days     Dr. Jacob Prior has discussed the risks associated with transplant which include the risk of infection, bleeding and inability to obtain a long term remission. He understands these risks and is willing to proceed. The consent is signed and on the chart. Day + 1     2. ID:  Afebrile, no evidence of infection.    - Cont Acyclovir & Diflucan ppx  - Start Levaquin when [address] < 1.5  - Hx positive H. pylori breath test w/ negative EGD & Colonoscopy Bx (8/18/22)      3.   Heme: Anemia & thrombocytopenia d/t recent chemotherapy    - Start daily G-CSF Day + 5 (9/4/22)  - Transfuse for Hgb < 7 and Platelets < 94U  - No transfusion today     4. Metabolic: Mild Hyperglycemia and renal fxn stable. - Start IVF hydration: 1 L NS each day in OP Infusion  - Replace potassium and magnesium per policy. 5. Nutrition / GI:  Appetite and oral intake is good. - Start low microbial diet   - Follow closely with dietary  GI: no active issues  - Hx positive H. pylori breath test w/ negative EGD & Colonoscopy Bx (8/18/22)  - Zofran (Schedule 8/31/22) and Compazine PRN for nausea / vomiting      6. Pulmonary: No active issues   - DLCO of 75% of predicted finding on ASCT workup: No obvious etiology for this  - CXR 8/29/22: Negative for acute disease     7. Prevention of SRE:    - PET/CT was without bony disease. Can hold of on a bisphosphonate. 8. Peripheral Neuropathy:   - Cont Gabapentin 300 mg nighty (Consider increasing dose if symptoms persist)      - Disposition: Return to OP Infusion daily for toxicity assessment, labs and MD visit. The patient was seen and examined by Dr. Marni Villalobos.      BRINDA Roberts - NP

## 2022-08-31 NOTE — PROGRESS NOTES
Short Stay Communication Note  Adriana Barraza  Diagnosis:  Primary MD:  Treatment: Melphalan Fludarabine/Cytoxan  Day +1 of Auto Transplant   Pt seen in outpatient infusion today. Labs drawn and reviewed. CBC:   Recent Labs     08/29/22  0855 08/30/22  0807 08/31/22  0845   WBC 4.4 9.4 5.9   HGB 14.6 12.9* 12.1*   HCT 43.7 37.4* 35.6*   MCV 93.7 92.9 93.8   * 114* 95*     BMP/Mag:  Recent Labs     08/29/22  0855 08/30/22  0807 08/31/22  0845    137 137   K 4.3 4.0 3.7    103 104   CO2 28 26 28   PHOS 3.6 3.3 2.6   BUN 12 15 13   CREATININE 0.7* 0.8* 0.7*   MG 1.90  --  1.70*     Standing parameters for replacement for this patient:   1 unit of pack red blood cells for a hemoglobin < or equal to 7  1 pack of platelets for a platelet count less than or equal to 10  40 MeQ of Potassium administered for a potassium level less than or equal to 3.4  4g of Magnesium Sulfate for a magnesum level less than or equal to 1.4  No transfusions required for the above lab values. Urinalysis last done: 8/29/2022 Urinalysis next due: 9/5/2022    Chest X-Ray last done: 8/29/2022 Chest X-Ray next due: 9/5/2022    Symptoms addressed and reported to care team this date: nausea  Treatments this date: IV Fluids    Reviewed medication schedule with pt and caregiver. Both able to verbalize all medications and schedule. Pt to be seen again tomorrow. Patient and caregiver verbalized understanding of discharge instructions including when and how to call the doctor and when to report  to the ER. Discharged ambulatory to home.      Amrit Gill RN

## 2022-09-01 ENCOUNTER — HOSPITAL ENCOUNTER (OUTPATIENT)
Dept: ONCOLOGY | Age: 47
Setting detail: INFUSION SERIES
Discharge: HOME | End: 2022-09-01
Payer: COMMERCIAL

## 2022-09-01 DIAGNOSIS — C90.00 MULTIPLE MYELOMA NOT HAVING ACHIEVED REMISSION (HCC): Primary | ICD-10-CM

## 2022-09-01 LAB
ANION GAP SERPL CALCULATED.3IONS-SCNC: 6 MMOL/L (ref 3–16)
BASOPHILS ABSOLUTE: 0 K/UL (ref 0–0.2)
BASOPHILS RELATIVE PERCENT: 0.4 %
BUN BLDV-MCNC: 13 MG/DL (ref 7–20)
CALCIUM SERPL-MCNC: 9 MG/DL (ref 8.3–10.6)
CHLORIDE BLD-SCNC: 99 MMOL/L (ref 99–110)
CO2: 30 MMOL/L (ref 21–32)
CREAT SERPL-MCNC: 0.8 MG/DL (ref 0.9–1.3)
EOSINOPHILS ABSOLUTE: 0 K/UL (ref 0–0.6)
EOSINOPHILS RELATIVE PERCENT: 0.2 %
GFR AFRICAN AMERICAN: >60
GFR NON-AFRICAN AMERICAN: >60
GLUCOSE BLD-MCNC: 128 MG/DL (ref 70–99)
HCT VFR BLD CALC: 39.8 % (ref 40.5–52.5)
HEMOGLOBIN: 13.5 G/DL (ref 13.5–17.5)
INR BLD: 1.04 (ref 0.87–1.14)
LYMPHOCYTES ABSOLUTE: 0.1 K/UL (ref 1–5.1)
LYMPHOCYTES RELATIVE PERCENT: 1.9 %
MCH RBC QN AUTO: 31.9 PG (ref 26–34)
MCHC RBC AUTO-ENTMCNC: 33.9 G/DL (ref 31–36)
MCV RBC AUTO: 94.1 FL (ref 80–100)
MONOCYTES ABSOLUTE: 0.1 K/UL (ref 0–1.3)
MONOCYTES RELATIVE PERCENT: 1.4 %
NEUTROPHILS ABSOLUTE: 4.9 K/UL (ref 1.7–7.7)
NEUTROPHILS RELATIVE PERCENT: 96.1 %
PDW BLD-RTO: 13 % (ref 12.4–15.4)
PHOSPHORUS: 3.7 MG/DL (ref 2.5–4.9)
PLATELET # BLD: 97 K/UL (ref 135–450)
PMV BLD AUTO: 8.4 FL (ref 5–10.5)
POTASSIUM SERPL-SCNC: 3.8 MMOL/L (ref 3.5–5.1)
PROTHROMBIN TIME: 13.5 SEC (ref 11.7–14.5)
RBC # BLD: 4.23 M/UL (ref 4.2–5.9)
SODIUM BLD-SCNC: 135 MMOL/L (ref 136–145)
WBC # BLD: 5.1 K/UL (ref 4–11)

## 2022-09-01 PROCEDURE — 96361 HYDRATE IV INFUSION ADD-ON: CPT

## 2022-09-01 PROCEDURE — 96360 HYDRATION IV INFUSION INIT: CPT

## 2022-09-01 PROCEDURE — 36592 COLLECT BLOOD FROM PICC: CPT

## 2022-09-01 PROCEDURE — 85025 COMPLETE CBC W/AUTO DIFF WBC: CPT

## 2022-09-01 PROCEDURE — 6360000002 HC RX W HCPCS: Performed by: INTERNAL MEDICINE

## 2022-09-01 PROCEDURE — 2580000003 HC RX 258: Performed by: INTERNAL MEDICINE

## 2022-09-01 PROCEDURE — 84100 ASSAY OF PHOSPHORUS: CPT

## 2022-09-01 PROCEDURE — 80048 BASIC METABOLIC PNL TOTAL CA: CPT

## 2022-09-01 PROCEDURE — 85610 PROTHROMBIN TIME: CPT

## 2022-09-01 PROCEDURE — 96374 THER/PROPH/DIAG INJ IV PUSH: CPT

## 2022-09-01 RX ORDER — POTASSIUM CHLORIDE 20 MEQ/1
40 TABLET, EXTENDED RELEASE ORAL PRN
Status: CANCELLED | OUTPATIENT
Start: 2022-09-02

## 2022-09-01 RX ORDER — POTASSIUM CHLORIDE 29.8 MG/ML
20 INJECTION INTRAVENOUS PRN
Status: DISCONTINUED | OUTPATIENT
Start: 2022-09-01 | End: 2022-09-02 | Stop reason: HOSPADM

## 2022-09-01 RX ORDER — SODIUM CHLORIDE 9 MG/ML
INJECTION, SOLUTION INTRAVENOUS CONTINUOUS PRN
Status: CANCELLED | OUTPATIENT
Start: 2022-09-02

## 2022-09-01 RX ORDER — ONDANSETRON 4 MG/1
8 TABLET, FILM COATED ORAL EVERY 8 HOURS PRN
Status: DISCONTINUED | OUTPATIENT
Start: 2022-09-01 | End: 2022-09-02 | Stop reason: HOSPADM

## 2022-09-01 RX ORDER — POTASSIUM CHLORIDE 20 MEQ/1
40 TABLET, EXTENDED RELEASE ORAL PRN
Status: DISCONTINUED | OUTPATIENT
Start: 2022-09-01 | End: 2022-09-02 | Stop reason: HOSPADM

## 2022-09-01 RX ORDER — OXYCODONE HYDROCHLORIDE 5 MG/1
5 TABLET ORAL EVERY 4 HOURS PRN
Status: DISCONTINUED | OUTPATIENT
Start: 2022-09-01 | End: 2022-09-02 | Stop reason: HOSPADM

## 2022-09-01 RX ORDER — HEPARIN SODIUM (PORCINE) LOCK FLUSH IV SOLN 100 UNIT/ML 100 UNIT/ML
500 SOLUTION INTRAVENOUS PRN
Status: CANCELLED | OUTPATIENT
Start: 2022-09-02

## 2022-09-01 RX ORDER — PROCHLORPERAZINE MALEATE 10 MG
10 TABLET ORAL EVERY 6 HOURS PRN
Status: CANCELLED | OUTPATIENT
Start: 2022-09-02

## 2022-09-01 RX ORDER — HEPARIN SODIUM (PORCINE) LOCK FLUSH IV SOLN 100 UNIT/ML 100 UNIT/ML
500 SOLUTION INTRAVENOUS PRN
Status: DISCONTINUED | OUTPATIENT
Start: 2022-09-01 | End: 2022-09-02 | Stop reason: HOSPADM

## 2022-09-01 RX ORDER — MAGNESIUM SULFATE IN WATER 40 MG/ML
4000 INJECTION, SOLUTION INTRAVENOUS PRN
Status: DISCONTINUED | OUTPATIENT
Start: 2022-09-01 | End: 2022-09-02 | Stop reason: HOSPADM

## 2022-09-01 RX ORDER — POTASSIUM CHLORIDE 29.8 MG/ML
80 INJECTION INTRAVENOUS PRN
Status: CANCELLED | OUTPATIENT
Start: 2022-09-02

## 2022-09-01 RX ORDER — MAGNESIUM SULFATE IN WATER 40 MG/ML
4000 INJECTION, SOLUTION INTRAVENOUS PRN
Status: CANCELLED | OUTPATIENT
Start: 2022-09-02

## 2022-09-01 RX ORDER — 0.9 % SODIUM CHLORIDE 0.9 %
1000 INTRAVENOUS SOLUTION INTRAVENOUS ONCE
Status: COMPLETED | OUTPATIENT
Start: 2022-09-01 | End: 2022-09-01

## 2022-09-01 RX ORDER — 0.9 % SODIUM CHLORIDE 0.9 %
1000 INTRAVENOUS SOLUTION INTRAVENOUS ONCE
Status: CANCELLED | OUTPATIENT
Start: 2022-09-02 | End: 2022-09-02

## 2022-09-01 RX ORDER — PROCHLORPERAZINE EDISYLATE 5 MG/ML
10 INJECTION INTRAMUSCULAR; INTRAVENOUS EVERY 6 HOURS PRN
Status: CANCELLED | OUTPATIENT
Start: 2022-09-02

## 2022-09-01 RX ORDER — ONDANSETRON 2 MG/ML
8 INJECTION INTRAMUSCULAR; INTRAVENOUS EVERY 8 HOURS PRN
Status: CANCELLED | OUTPATIENT
Start: 2022-09-02

## 2022-09-01 RX ORDER — ONDANSETRON 4 MG/1
8 TABLET, FILM COATED ORAL EVERY 8 HOURS PRN
Status: CANCELLED | OUTPATIENT
Start: 2022-09-02

## 2022-09-01 RX ORDER — ONDANSETRON 2 MG/ML
8 INJECTION INTRAMUSCULAR; INTRAVENOUS EVERY 8 HOURS PRN
Status: DISCONTINUED | OUTPATIENT
Start: 2022-09-01 | End: 2022-09-02 | Stop reason: HOSPADM

## 2022-09-01 RX ORDER — PROCHLORPERAZINE EDISYLATE 5 MG/ML
10 INJECTION INTRAMUSCULAR; INTRAVENOUS EVERY 6 HOURS PRN
Status: DISCONTINUED | OUTPATIENT
Start: 2022-09-01 | End: 2022-09-02 | Stop reason: HOSPADM

## 2022-09-01 RX ORDER — DIMETHICONE, CAMPHOR (SYNTHETIC), MENTHOL, AND PHENOL 1.1; .5; .625; .5 G/100G; G/100G; G/100G; G/100G
OINTMENT TOPICAL PRN
Status: DISCONTINUED | OUTPATIENT
Start: 2022-09-01 | End: 2022-09-02 | Stop reason: HOSPADM

## 2022-09-01 RX ORDER — SODIUM CHLORIDE 9 MG/ML
INJECTION, SOLUTION INTRAVENOUS CONTINUOUS PRN
Status: DISCONTINUED | OUTPATIENT
Start: 2022-09-01 | End: 2022-09-02 | Stop reason: HOSPADM

## 2022-09-01 RX ORDER — PROCHLORPERAZINE MALEATE 10 MG
10 TABLET ORAL EVERY 6 HOURS PRN
Status: DISCONTINUED | OUTPATIENT
Start: 2022-09-01 | End: 2022-09-02 | Stop reason: HOSPADM

## 2022-09-01 RX ORDER — OXYCODONE HYDROCHLORIDE 5 MG/1
5 TABLET ORAL EVERY 4 HOURS PRN
Status: CANCELLED | OUTPATIENT
Start: 2022-09-02

## 2022-09-01 RX ORDER — OXYCODONE HYDROCHLORIDE 5 MG/1
10 TABLET ORAL EVERY 4 HOURS PRN
Status: CANCELLED | OUTPATIENT
Start: 2022-09-02

## 2022-09-01 RX ORDER — OXYCODONE HYDROCHLORIDE 5 MG/1
10 TABLET ORAL EVERY 4 HOURS PRN
Status: DISCONTINUED | OUTPATIENT
Start: 2022-09-01 | End: 2022-09-02 | Stop reason: HOSPADM

## 2022-09-01 RX ORDER — PETROLATUM, MENTHOL, UNSPECIFIED FORM, CAMPHOR (SYNTHETIC), AND PHENOL 59.14; 1; 1; .6 G/100G; G/100G; G/100G; G/100G
PASTE TOPICAL PRN
Status: CANCELLED | OUTPATIENT
Start: 2022-09-02

## 2022-09-01 RX ADMIN — SODIUM CHLORIDE, PRESERVATIVE FREE 500 UNITS: 5 INJECTION INTRAVENOUS at 10:41

## 2022-09-01 RX ADMIN — SODIUM CHLORIDE, PRESERVATIVE FREE 500 UNITS: 5 INJECTION INTRAVENOUS at 10:39

## 2022-09-01 RX ADMIN — SODIUM CHLORIDE 1000 ML: 9 INJECTION, SOLUTION INTRAVENOUS at 08:51

## 2022-09-01 RX ADMIN — ONDANSETRON HYDROCHLORIDE 8 MG: 2 INJECTION, SOLUTION INTRAMUSCULAR; INTRAVENOUS at 10:40

## 2022-09-01 NOTE — PROGRESS NOTES
Short Stay Communication Note  Cheryl Harrison  Diagnosis:  Primary MD:  Treatment: Melphalan Fludarabine/Cytoxan  Day +2 of Auto Transplant   Pt seen in outpatient infusion today. Labs drawn and reviewed. CBC:   Recent Labs     08/30/22  0807 08/31/22  0845 09/01/22  0835   WBC 9.4 5.9 5.1   HGB 12.9* 12.1* 13.5   HCT 37.4* 35.6* 39.8*   MCV 92.9 93.8 94.1   * 95* 97*     BMP/Mag:  Recent Labs     08/30/22  0807 08/31/22  0845 09/01/22  0835    137 135*   K 4.0 3.7 3.8    104 99   CO2 26 28 30   PHOS 3.3 2.6 3.7   BUN 15 13 13   CREATININE 0.8* 0.7* 0.8*   MG  --  1.70*  --      Standing parameters for replacement for this patient:   1 unit of pack red blood cells for a hemoglobin < or equal to 7  1 pack of platelets for a platelet count less than or equal to 10  40 MeQ of Potassium administered for a potassium level less than or equal to 3.4  4g of Magnesium Sulfate for a magnesum level less than or equal to 1.4  No transfusions required for the above lab values. Urinalysis last done: 8/29/2022 Urinalysis next due: 9/5/2022    Chest X-Ray last done: 8/29/2022 Chest X-Ray next due: 9/5/2022    Symptoms addressed and reported to care team this date: ongoing nausea no emesis  Treatments this date: Labs & IV Fluids    Reviewed medication schedule with pt and caregiver. Both able to verbalize all medications and schedule. Pt to be seen again tomorrow. Patient and caregiver verbalized understanding of discharge instructions including when and how to call the doctor and when to report  to the ER. Discharged ambulatory to home.      Oral Tano, RN

## 2022-09-01 NOTE — PROGRESS NOTES
[facility]  Autologous Progress Note    2022    Tammy Deacon    :  1975    MRN:  [id number]    Referring MD: Maia Charles MD  [address], [address], [address],  [address]      Subjective:  Patient feels well today, no fevers, still complains of mild nausea that antiemetics relieve. Still eating and drinking well. ECOG PS:  (1) Restricted in physically strenuous activity, ambulatory and able to do work of light nature    KPS: 80% Normal activity with effort; some signs or symptoms of disease    Isolation:  None     Medications    Scheduled Meds:   sodium chloride  1,000 mL IntraVENous Once     Continuous Infusions:   sodium chloride       PRN Meds:.sodium chloride, potassium chloride, potassium chloride, magnesium sulfate, magnesium hydroxide, medicated lip ointment, oxyCODONE, oxyCODONE, prochlorperazine, prochlorperazine, ondansetron, ondansetron, heparin flush    ROS:  As noted above, otherwise remainder of 10-point ROS negative    Physical Exam:     I&O:    Intake/Output Summary (Last 24 hours) at 2022 1006  Last data filed at 2022 2000  Gross per 24 hour   Intake 1060 ml   Output --   Net 1060 ml       Vital Signs:  /71   Pulse 89   Temp 98 °F (36.7 °C) (Oral)   Resp 16   Wt 143 lb 8.3 oz (65.1 kg)   SpO2 99%   BMI 23.16 kg/m²     Weight:    Wt Readings from Last 3 Encounters:   22 143 lb 8.3 oz (65.1 kg)   22 147 lb 14.9 oz (67.1 kg)   22 144 lb 6.4 oz (65.5 kg)       General: Awake, alert and oriented.   HEENT: normocephalic, alopecia, PERRL, no scleral erythema or icterus, Oral mucosa moist and intact, throat clear  NECK: supple without palpable adenopathy  BACK: Straight negative CVAT  SKIN: warm dry and intact without lesions rashes or masses  CHEST: CTA bilaterally without use of accessory muscles  CV: Normal S1 S2, RRR, no MRG  ABD: NT ND normoactive BS, no palpable masses or hepatosplenomegaly  EXTREMITIES: without edema, denies calf tenderness  NEURO: CN II - XII grossly intact  CATHETER: Right Subclavian Trifusion (IR: Dianne, 7/22/22): CDI      Data:   CBC:   Recent Labs     08/30/22  0807 08/31/22  0845 09/01/22  0835   WBC 9.4 5.9 5.1   HGB 12.9* 12.1* 13.5   HCT 37.4* 35.6* 39.8*   MCV 92.9 93.8 94.1   * 95* 97*     BMP/Mag:  Recent Labs     08/30/22  0807 08/31/22  0845 09/01/22  0835    137 135*   K 4.0 3.7 3.8    104 99   CO2 26 28 30   PHOS 3.3 2.6 3.7   BUN 15 13 13   CREATININE 0.8* 0.7* 0.8*   MG  --  1.70*  --      LIVP:   Recent Labs     08/31/22  0845   AST 15   ALT 11   BILIDIR <0.2   BILITOT 0.5   ALKPHOS 62     Uric Acid:    Recent Labs     08/31/22  0845   LABURIC 2.4*     Coags:   Recent Labs     09/01/22  0835   PROTIME 13.5   INR 1.04         PROBLEM LIST:            Multiple Myeloma  HTN  HLD  Positive H. pylori breath test      TREATMENT:            RVd on (4/11/22) x 4 cycles   [id number] & ASCT (8/30/22) w/ 4.84 x 10^6 CD34+ cells/kg     ASSESSMENT AND PLAN:            1. IgG lambda myeloma, ISS stage II: VGPR  - Bone marrow biopsy (3/10/2022): showed 70% cellularity with plasma cells estimated to account for between 30-50%. 46,XY[20]. Myeloma ROBIN panel showed three copies of CCND1 (11q13. 3) in 83% of cells, four copies of CCND1 (11q13. 3) in 5% of cells, three copies of MAF (16q23.2) in 54% of cells, and monosomy 13 in 9% of cells. - PET/CT, 3/09/2022, showed no evidence of hypermetabolism. - [id number] & ASCT (8/30/22) w/ 4.84 x 10^6 CD34+ cells/kg     Emend Hypersensitivity 8/29/22 including facial flushing, stomach cramping. D/C'ed and started PO Zyprexa 10 mg once 8/29/22 then 5 mg PO daily for 3 more days      Day + 2     2. ID:  Afebrile, no evidence of infection.    - Cont Acyclovir & Diflucan ppx  - Start Levaquin when [address] < 1.5  - Hx positive H. pylori breath test w/ negative EGD & Colonoscopy Bx (8/18/22)      3.   Heme: Anemia & thrombocytopenia d/t recent chemotherapy    - Start daily G-CSF Day + 5 (9/4/22)  - Transfuse for Hgb < 7 and Platelets < 19Y  - No transfusion today     4. Metabolic: Mild Hyperglycemia and renal fxn stable. - Start IVF hydration: 1 L NS each day in OP Infusion  - Replace potassium and magnesium per policy. 5. Nutrition / GI:  Appetite and oral intake is good. - Start low microbial diet   - Follow closely with dietary  GI: no active issues  - Hx positive H. pylori breath test w/ negative EGD & Colonoscopy Bx (8/18/22)  - Zofran (Schedule 8/31/22) and Compazine PRN for nausea / vomiting      6. Pulmonary: No active issues   - DLCO of 75% of predicted finding on ASCT workup: No obvious etiology for this  - CXR 8/29/22: Negative for acute disease     7. Prevention of SRE:    - PET/CT was without bony disease. Can hold of on a bisphosphonate. 8. Peripheral Neuropathy:   - Cont Gabapentin 300 mg nighty (Consider increasing dose if symptoms persist)      - Disposition: Return to OP Infusion daily for toxicity assessment, labs and MD visit. The patient was seen and examined by Dr. Greg Aguero. Elijah Whitley, APRN - NP     Palma Tanner.  Greg Aguero, [address]

## 2022-09-02 ENCOUNTER — HOSPITAL ENCOUNTER (OUTPATIENT)
Dept: ONCOLOGY | Age: 47
Setting detail: INFUSION SERIES
Discharge: HOME | End: 2022-09-02
Payer: COMMERCIAL

## 2022-09-02 VITALS
HEART RATE: 90 BPM | RESPIRATION RATE: 16 BRPM | DIASTOLIC BLOOD PRESSURE: 67 MMHG | BODY MASS INDEX: 22.6 KG/M2 | SYSTOLIC BLOOD PRESSURE: 105 MMHG | WEIGHT: 139.99 LBS | OXYGEN SATURATION: 100 % | TEMPERATURE: 98.4 F

## 2022-09-02 DIAGNOSIS — C90.00 MULTIPLE MYELOMA NOT HAVING ACHIEVED REMISSION (HCC): Primary | ICD-10-CM

## 2022-09-02 LAB
ALBUMIN SERPL-MCNC: 4.2 G/DL (ref 3.4–5)
ALP BLD-CCNC: 76 U/L (ref 40–129)
ALT SERPL-CCNC: 13 U/L (ref 10–40)
ANION GAP SERPL CALCULATED.3IONS-SCNC: 9 MMOL/L (ref 3–16)
AST SERPL-CCNC: 18 U/L (ref 15–37)
BASOPHILS ABSOLUTE: 0 K/UL (ref 0–0.2)
BASOPHILS RELATIVE PERCENT: 0 %
BILIRUB SERPL-MCNC: 0.9 MG/DL (ref 0–1)
BILIRUBIN DIRECT: <0.2 MG/DL (ref 0–0.3)
BILIRUBIN, INDIRECT: ABNORMAL MG/DL (ref 0–1)
BUN BLDV-MCNC: 12 MG/DL (ref 7–20)
CALCIUM SERPL-MCNC: 9 MG/DL (ref 8.3–10.6)
CHLORIDE BLD-SCNC: 99 MMOL/L (ref 99–110)
CO2: 29 MMOL/L (ref 21–32)
CREAT SERPL-MCNC: 0.8 MG/DL (ref 0.9–1.3)
EOSINOPHILS ABSOLUTE: 0 K/UL (ref 0–0.6)
EOSINOPHILS RELATIVE PERCENT: 0.7 %
GFR AFRICAN AMERICAN: >60
GFR NON-AFRICAN AMERICAN: >60
GLUCOSE BLD-MCNC: 111 MG/DL (ref 70–99)
HCT VFR BLD CALC: 40.2 % (ref 40.5–52.5)
HEMOGLOBIN: 13.3 G/DL (ref 13.5–17.5)
LACTATE DEHYDROGENASE: 187 U/L (ref 100–190)
LYMPHOCYTES ABSOLUTE: 0 K/UL (ref 1–5.1)
LYMPHOCYTES RELATIVE PERCENT: 1.6 %
MAGNESIUM: 1.8 MG/DL (ref 1.8–2.4)
MCH RBC QN AUTO: 31.2 PG (ref 26–34)
MCHC RBC AUTO-ENTMCNC: 33.2 G/DL (ref 31–36)
MCV RBC AUTO: 94 FL (ref 80–100)
MONOCYTES ABSOLUTE: 0 K/UL (ref 0–1.3)
MONOCYTES RELATIVE PERCENT: 1.3 %
NEUTROPHILS ABSOLUTE: 2.8 K/UL (ref 1.7–7.7)
NEUTROPHILS RELATIVE PERCENT: 96.4 %
PDW BLD-RTO: 12.9 % (ref 12.4–15.4)
PHOSPHORUS: 3.8 MG/DL (ref 2.5–4.9)
PLATELET # BLD: 92 K/UL (ref 135–450)
PMV BLD AUTO: 8.4 FL (ref 5–10.5)
POTASSIUM SERPL-SCNC: 4.1 MMOL/L (ref 3.5–5.1)
RBC # BLD: 4.27 M/UL (ref 4.2–5.9)
SODIUM BLD-SCNC: 137 MMOL/L (ref 136–145)
TOTAL PROTEIN: 6 G/DL (ref 6.4–8.2)
URIC ACID, SERUM: 2.3 MG/DL (ref 3.5–7.2)
WBC # BLD: 2.9 K/UL (ref 4–11)

## 2022-09-02 PROCEDURE — 96360 HYDRATION IV INFUSION INIT: CPT

## 2022-09-02 PROCEDURE — 80048 BASIC METABOLIC PNL TOTAL CA: CPT

## 2022-09-02 PROCEDURE — 96374 THER/PROPH/DIAG INJ IV PUSH: CPT

## 2022-09-02 PROCEDURE — 84100 ASSAY OF PHOSPHORUS: CPT

## 2022-09-02 PROCEDURE — 2580000003 HC RX 258: Performed by: INTERNAL MEDICINE

## 2022-09-02 PROCEDURE — 6360000002 HC RX W HCPCS: Performed by: INTERNAL MEDICINE

## 2022-09-02 PROCEDURE — 85025 COMPLETE CBC W/AUTO DIFF WBC: CPT

## 2022-09-02 PROCEDURE — 80076 HEPATIC FUNCTION PANEL: CPT

## 2022-09-02 PROCEDURE — 36592 COLLECT BLOOD FROM PICC: CPT

## 2022-09-02 PROCEDURE — 83615 LACTATE (LD) (LDH) ENZYME: CPT

## 2022-09-02 PROCEDURE — 96361 HYDRATE IV INFUSION ADD-ON: CPT

## 2022-09-02 PROCEDURE — 83735 ASSAY OF MAGNESIUM: CPT

## 2022-09-02 PROCEDURE — 84550 ASSAY OF BLOOD/URIC ACID: CPT

## 2022-09-02 RX ORDER — PROCHLORPERAZINE MALEATE 10 MG
10 TABLET ORAL EVERY 6 HOURS PRN
Status: CANCELLED | OUTPATIENT
Start: 2022-09-03

## 2022-09-02 RX ORDER — ONDANSETRON 4 MG/1
8 TABLET, FILM COATED ORAL EVERY 8 HOURS PRN
Status: CANCELLED | OUTPATIENT
Start: 2022-09-03

## 2022-09-02 RX ORDER — HEPARIN SODIUM (PORCINE) LOCK FLUSH IV SOLN 100 UNIT/ML 100 UNIT/ML
500 SOLUTION INTRAVENOUS PRN
Status: DISCONTINUED | OUTPATIENT
Start: 2022-09-02 | End: 2022-09-03 | Stop reason: HOSPADM

## 2022-09-02 RX ORDER — SODIUM CHLORIDE 9 MG/ML
INJECTION, SOLUTION INTRAVENOUS CONTINUOUS PRN
Status: DISCONTINUED | OUTPATIENT
Start: 2022-09-02 | End: 2022-09-03 | Stop reason: HOSPADM

## 2022-09-02 RX ORDER — POTASSIUM CHLORIDE 29.8 MG/ML
20 INJECTION INTRAVENOUS PRN
Status: DISCONTINUED | OUTPATIENT
Start: 2022-09-02 | End: 2022-09-03 | Stop reason: HOSPADM

## 2022-09-02 RX ORDER — DIMETHICONE, CAMPHOR (SYNTHETIC), MENTHOL, AND PHENOL 1.1; .5; .625; .5 G/100G; G/100G; G/100G; G/100G
OINTMENT TOPICAL PRN
Status: DISCONTINUED | OUTPATIENT
Start: 2022-09-02 | End: 2022-09-03 | Stop reason: HOSPADM

## 2022-09-02 RX ORDER — SODIUM CHLORIDE 9 MG/ML
INJECTION, SOLUTION INTRAVENOUS CONTINUOUS PRN
Status: CANCELLED | OUTPATIENT
Start: 2022-09-03

## 2022-09-02 RX ORDER — ONDANSETRON 4 MG/1
8 TABLET, FILM COATED ORAL EVERY 8 HOURS PRN
Status: DISCONTINUED | OUTPATIENT
Start: 2022-09-02 | End: 2022-09-03 | Stop reason: HOSPADM

## 2022-09-02 RX ORDER — ONDANSETRON 2 MG/ML
8 INJECTION INTRAMUSCULAR; INTRAVENOUS EVERY 8 HOURS PRN
Status: DISCONTINUED | OUTPATIENT
Start: 2022-09-02 | End: 2022-09-03 | Stop reason: HOSPADM

## 2022-09-02 RX ORDER — 0.9 % SODIUM CHLORIDE 0.9 %
1000 INTRAVENOUS SOLUTION INTRAVENOUS ONCE
Status: COMPLETED | OUTPATIENT
Start: 2022-09-02 | End: 2022-09-02

## 2022-09-02 RX ORDER — OXYCODONE HYDROCHLORIDE 5 MG/1
10 TABLET ORAL EVERY 4 HOURS PRN
Status: CANCELLED | OUTPATIENT
Start: 2022-09-03

## 2022-09-02 RX ORDER — MAGNESIUM SULFATE IN WATER 40 MG/ML
4000 INJECTION, SOLUTION INTRAVENOUS PRN
Status: DISCONTINUED | OUTPATIENT
Start: 2022-09-02 | End: 2022-09-03 | Stop reason: HOSPADM

## 2022-09-02 RX ORDER — PETROLATUM, MENTHOL, UNSPECIFIED FORM, CAMPHOR (SYNTHETIC), AND PHENOL 59.14; 1; 1; .6 G/100G; G/100G; G/100G; G/100G
PASTE TOPICAL PRN
Status: CANCELLED | OUTPATIENT
Start: 2022-09-03

## 2022-09-02 RX ORDER — OXYCODONE HYDROCHLORIDE 5 MG/1
5 TABLET ORAL EVERY 4 HOURS PRN
Status: CANCELLED | OUTPATIENT
Start: 2022-09-03

## 2022-09-02 RX ORDER — 0.9 % SODIUM CHLORIDE 0.9 %
1000 INTRAVENOUS SOLUTION INTRAVENOUS ONCE
Status: CANCELLED | OUTPATIENT
Start: 2022-09-03 | End: 2022-09-03

## 2022-09-02 RX ORDER — HEPARIN SODIUM (PORCINE) LOCK FLUSH IV SOLN 100 UNIT/ML 100 UNIT/ML
500 SOLUTION INTRAVENOUS PRN
Status: CANCELLED | OUTPATIENT
Start: 2022-09-03

## 2022-09-02 RX ORDER — OXYCODONE HYDROCHLORIDE 5 MG/1
10 TABLET ORAL EVERY 4 HOURS PRN
Status: DISCONTINUED | OUTPATIENT
Start: 2022-09-02 | End: 2022-09-03 | Stop reason: HOSPADM

## 2022-09-02 RX ORDER — ONDANSETRON 2 MG/ML
8 INJECTION INTRAMUSCULAR; INTRAVENOUS EVERY 8 HOURS PRN
Status: CANCELLED | OUTPATIENT
Start: 2022-09-03

## 2022-09-02 RX ORDER — PROCHLORPERAZINE EDISYLATE 5 MG/ML
10 INJECTION INTRAMUSCULAR; INTRAVENOUS EVERY 6 HOURS PRN
Status: DISCONTINUED | OUTPATIENT
Start: 2022-09-02 | End: 2022-09-03 | Stop reason: HOSPADM

## 2022-09-02 RX ORDER — POTASSIUM CHLORIDE 29.8 MG/ML
80 INJECTION INTRAVENOUS PRN
Status: CANCELLED | OUTPATIENT
Start: 2022-09-03

## 2022-09-02 RX ORDER — POTASSIUM CHLORIDE 20 MEQ/1
40 TABLET, EXTENDED RELEASE ORAL PRN
Status: CANCELLED | OUTPATIENT
Start: 2022-09-03

## 2022-09-02 RX ORDER — POTASSIUM CHLORIDE 20 MEQ/1
40 TABLET, EXTENDED RELEASE ORAL PRN
Status: DISCONTINUED | OUTPATIENT
Start: 2022-09-02 | End: 2022-09-03 | Stop reason: HOSPADM

## 2022-09-02 RX ORDER — OLANZAPINE 5 MG/1
5 TABLET ORAL NIGHTLY
Qty: 20 TABLET | Refills: 0 | Status: SHIPPED | OUTPATIENT
Start: 2022-09-02 | End: 2022-09-08 | Stop reason: SDUPTHER

## 2022-09-02 RX ORDER — OXYCODONE HYDROCHLORIDE 5 MG/1
5 TABLET ORAL EVERY 4 HOURS PRN
Status: DISCONTINUED | OUTPATIENT
Start: 2022-09-02 | End: 2022-09-03 | Stop reason: HOSPADM

## 2022-09-02 RX ORDER — PROCHLORPERAZINE MALEATE 10 MG
10 TABLET ORAL EVERY 6 HOURS PRN
Status: DISCONTINUED | OUTPATIENT
Start: 2022-09-02 | End: 2022-09-03 | Stop reason: HOSPADM

## 2022-09-02 RX ORDER — PROCHLORPERAZINE EDISYLATE 5 MG/ML
10 INJECTION INTRAMUSCULAR; INTRAVENOUS EVERY 6 HOURS PRN
Status: CANCELLED | OUTPATIENT
Start: 2022-09-03

## 2022-09-02 RX ORDER — MAGNESIUM SULFATE IN WATER 40 MG/ML
4000 INJECTION, SOLUTION INTRAVENOUS PRN
Status: CANCELLED | OUTPATIENT
Start: 2022-09-03

## 2022-09-02 RX ADMIN — SODIUM CHLORIDE, PRESERVATIVE FREE 500 UNITS: 5 INJECTION INTRAVENOUS at 10:44

## 2022-09-02 RX ADMIN — ONDANSETRON HYDROCHLORIDE 8 MG: 2 INJECTION, SOLUTION INTRAMUSCULAR; INTRAVENOUS at 10:44

## 2022-09-02 RX ADMIN — SODIUM CHLORIDE 1000 ML: 9 INJECTION, SOLUTION INTRAVENOUS at 08:49

## 2022-09-02 ASSESSMENT — PAIN SCALES - GENERAL: PAINLEVEL_OUTOF10: 0

## 2022-09-02 NOTE — PROCEDURES
Transplant (T0) Progress Note      Hailey Blake                           Blood Type: B pos    [unfilled]                           Start time:1128 Completion [id number]    Transplant Type: Autologous    Product Type: PBSC (peripheral blood stem cell)    Product Unit Number: [id number]    Cell Count: 4.84  x 10^6  Volume: 280 mL      Product Manipulation:      Volume Reduction  No  Plasma Depletion  No  RBC Depletion  No    Catheter lumen used for infusion: RED             Positive Blood Return: Yes    Donor Name: n/a                                     Blood Type: B pos    Relationship: SELF                          Donor Sex: male    Premeds Given: YES PER ORDERS    Adverse Reaction(s) and treatment: Baseline vital signs obtained prior to infusion and monitoring completed throughout per [facility] protocol - see flowsheets. All IVFs turned down to [facility] during stem cell infusion. Stem cell product(s) verified with 2nd RN Meliza Quinones prior to infusion using 2 patient identifiers. Infused via gravity with rate controlled by Riddhi Wong RN per [facility] protocols. Emergency medications epinephrine, benadryl, & hydrocortisone available as needed. Emergency medical equipment available as needed including telemetry monitoring throughout infusion, supplemental O2, suction equipment, and crash cart. RN at bedside throughout infusion.    Riddhi Wong RN

## 2022-09-02 NOTE — PROGRESS NOTES
Pt arrived day +3 auto transplant. A&Ox4, no concerns with mucous membranes, nausea remains, denies emesis, last dose of Compazine 0700, denies diarrhea. LS CTA, bowel sounds active, BM 9/2/2022, no noted edema. Fatigue mild. Pt reports intermittent dizziness with standing abruptly. Pt up in chair, call light in reach, family chairside, independent in room, steady gait, hourly rounding in place.  Electronically signed by Biju Figueroa RN on 9/2/2022 at 10:16 AM

## 2022-09-02 NOTE — PROGRESS NOTES
Short Stay Communication Note  Sim Rounds  Diagnosis: Multiple Myeloma  Primary MD: Dr. Sumi Becker  Treatment: Melphalan   Day +3 of Auto Transplant   Pt seen in outpatient infusion today. Labs drawn and reviewed. CBC:   Recent Labs     08/31/22 0845 09/01/22  0835 09/02/22  0851   WBC 5.9 5.1 2.9*   HGB 12.1* 13.5 13.3*   HCT 35.6* 39.8* 40.2*   MCV 93.8 94.1 94.0   PLT 95* 97* 92*     BMP/Mag:  Recent Labs     08/31/22  0845 09/01/22  0835 09/02/22  0851    135* 137   K 3.7 3.8 4.1    99 99   CO2 28 30 29   PHOS 2.6 3.7 3.8   BUN 13 13 12   CREATININE 0.7* 0.8* 0.8*   MG 1.70*  --  1.80     Standing parameters for replacement for this patient:   1 unit of pack red blood cells for a hemoglobin < or equal to 7.0  1 pack of platelets for a platelet count less than or equal to 10.0  40 MeQ of Potassium administered for a potassium level less than or equal to 3.4  4g of Magnesium Sulfate for a magnesum level less than or equal to 1.4  No transfusions required for the above lab values. Urinalysis last done: 8/30/2022 Urinalysis next due: 9/5/2022    Chest X-Ray last done: 8/30/2022 Chest X-Ray next due: 9/5/2022    Symptoms addressed and reported to care team this date:   Treatments this date: IV Fluids, IV zofran 8mg    Reviewed medication schedule with pt and caregiver. Both able to verbalize all medications and schedule. Pt to be seen again tomorrow. Patient and caregiver verbalized understanding of discharge instructions including when and how to call the doctor and when to report  to the ER. Discharged ambulatory to home. CVC line care completed, flushed, heparin locked.  Electronically signed by Jyoti Huerta RN on 9/2/2022 at 11:00 AM'

## 2022-09-03 ENCOUNTER — HOSPITAL ENCOUNTER (OUTPATIENT)
Dept: ONCOLOGY | Age: 47
Setting detail: INFUSION SERIES
Discharge: HOME | End: 2022-09-03
Payer: COMMERCIAL

## 2022-09-03 VITALS
HEART RATE: 78 BPM | OXYGEN SATURATION: 100 % | TEMPERATURE: 98.2 F | SYSTOLIC BLOOD PRESSURE: 111 MMHG | RESPIRATION RATE: 16 BRPM | DIASTOLIC BLOOD PRESSURE: 74 MMHG

## 2022-09-03 DIAGNOSIS — C90.00 MULTIPLE MYELOMA NOT HAVING ACHIEVED REMISSION (HCC): Primary | ICD-10-CM

## 2022-09-03 LAB
ANION GAP SERPL CALCULATED.3IONS-SCNC: 5 MMOL/L (ref 3–16)
BASOPHILS ABSOLUTE: 0 K/UL (ref 0–0.2)
BASOPHILS RELATIVE PERCENT: 0 %
BUN BLDV-MCNC: 12 MG/DL (ref 7–20)
CALCIUM SERPL-MCNC: 9.5 MG/DL (ref 8.3–10.6)
CHLORIDE BLD-SCNC: 99 MMOL/L (ref 99–110)
CO2: 31 MMOL/L (ref 21–32)
CREAT SERPL-MCNC: 0.8 MG/DL (ref 0.9–1.3)
EOSINOPHILS ABSOLUTE: 0 K/UL (ref 0–0.6)
EOSINOPHILS RELATIVE PERCENT: 0.7 %
GFR AFRICAN AMERICAN: >60
GFR NON-AFRICAN AMERICAN: >60
GLUCOSE BLD-MCNC: 138 MG/DL (ref 70–99)
HCT VFR BLD CALC: 40.6 % (ref 40.5–52.5)
HEMOGLOBIN: 13.6 G/DL (ref 13.5–17.5)
LYMPHOCYTES ABSOLUTE: 0 K/UL (ref 1–5.1)
LYMPHOCYTES RELATIVE PERCENT: 1.6 %
MCH RBC QN AUTO: 31.4 PG (ref 26–34)
MCHC RBC AUTO-ENTMCNC: 33.4 G/DL (ref 31–36)
MCV RBC AUTO: 93.9 FL (ref 80–100)
MONOCYTES ABSOLUTE: 0 K/UL (ref 0–1.3)
MONOCYTES RELATIVE PERCENT: 1.2 %
NEUTROPHILS ABSOLUTE: 2.4 K/UL (ref 1.7–7.7)
NEUTROPHILS RELATIVE PERCENT: 96.5 %
PDW BLD-RTO: 12.6 % (ref 12.4–15.4)
PHOSPHORUS: 4 MG/DL (ref 2.5–4.9)
PLATELET # BLD: 79 K/UL (ref 135–450)
PLATELET SLIDE REVIEW: ABNORMAL
PMV BLD AUTO: 8.1 FL (ref 5–10.5)
POTASSIUM SERPL-SCNC: 4.3 MMOL/L (ref 3.5–5.1)
RBC # BLD: 4.33 M/UL (ref 4.2–5.9)
RBC # BLD: NORMAL 10*6/UL
SODIUM BLD-SCNC: 135 MMOL/L (ref 136–145)
WBC # BLD: 2.4 K/UL (ref 4–11)

## 2022-09-03 PROCEDURE — 6360000002 HC RX W HCPCS: Performed by: INTERNAL MEDICINE

## 2022-09-03 PROCEDURE — 80048 BASIC METABOLIC PNL TOTAL CA: CPT

## 2022-09-03 PROCEDURE — 96374 THER/PROPH/DIAG INJ IV PUSH: CPT

## 2022-09-03 PROCEDURE — 85025 COMPLETE CBC W/AUTO DIFF WBC: CPT

## 2022-09-03 PROCEDURE — 36592 COLLECT BLOOD FROM PICC: CPT

## 2022-09-03 PROCEDURE — 84100 ASSAY OF PHOSPHORUS: CPT

## 2022-09-03 PROCEDURE — 96360 HYDRATION IV INFUSION INIT: CPT

## 2022-09-03 PROCEDURE — 2580000003 HC RX 258: Performed by: INTERNAL MEDICINE

## 2022-09-03 PROCEDURE — 96361 HYDRATE IV INFUSION ADD-ON: CPT

## 2022-09-03 RX ORDER — POTASSIUM CHLORIDE 29.8 MG/ML
20 INJECTION INTRAVENOUS PRN
Status: DISCONTINUED | OUTPATIENT
Start: 2022-09-03 | End: 2022-09-04 | Stop reason: HOSPADM

## 2022-09-03 RX ORDER — 0.9 % SODIUM CHLORIDE 0.9 %
1000 INTRAVENOUS SOLUTION INTRAVENOUS ONCE
Status: CANCELLED | OUTPATIENT
Start: 2022-09-04 | End: 2022-09-04

## 2022-09-03 RX ORDER — MAGNESIUM SULFATE IN WATER 40 MG/ML
4000 INJECTION, SOLUTION INTRAVENOUS PRN
Status: CANCELLED | OUTPATIENT
Start: 2022-09-04

## 2022-09-03 RX ORDER — POTASSIUM CHLORIDE 20 MEQ/1
40 TABLET, EXTENDED RELEASE ORAL PRN
Status: CANCELLED | OUTPATIENT
Start: 2022-09-04

## 2022-09-03 RX ORDER — HEPARIN SODIUM (PORCINE) LOCK FLUSH IV SOLN 100 UNIT/ML 100 UNIT/ML
500 SOLUTION INTRAVENOUS PRN
Status: CANCELLED | OUTPATIENT
Start: 2022-09-04

## 2022-09-03 RX ORDER — ONDANSETRON 4 MG/1
8 TABLET, FILM COATED ORAL EVERY 8 HOURS PRN
Status: DISCONTINUED | OUTPATIENT
Start: 2022-09-03 | End: 2022-09-04 | Stop reason: HOSPADM

## 2022-09-03 RX ORDER — MAGNESIUM SULFATE IN WATER 40 MG/ML
4000 INJECTION, SOLUTION INTRAVENOUS PRN
Status: DISCONTINUED | OUTPATIENT
Start: 2022-09-03 | End: 2022-09-04 | Stop reason: HOSPADM

## 2022-09-03 RX ORDER — ONDANSETRON 2 MG/ML
8 INJECTION INTRAMUSCULAR; INTRAVENOUS EVERY 8 HOURS PRN
Status: DISCONTINUED | OUTPATIENT
Start: 2022-09-03 | End: 2022-09-04 | Stop reason: HOSPADM

## 2022-09-03 RX ORDER — OXYCODONE HYDROCHLORIDE 5 MG/1
10 TABLET ORAL EVERY 4 HOURS PRN
Status: DISCONTINUED | OUTPATIENT
Start: 2022-09-03 | End: 2022-09-04 | Stop reason: HOSPADM

## 2022-09-03 RX ORDER — PROCHLORPERAZINE MALEATE 10 MG
10 TABLET ORAL EVERY 6 HOURS PRN
Status: CANCELLED | OUTPATIENT
Start: 2022-09-04

## 2022-09-03 RX ORDER — POTASSIUM CHLORIDE 20 MEQ/1
40 TABLET, EXTENDED RELEASE ORAL PRN
Status: DISCONTINUED | OUTPATIENT
Start: 2022-09-03 | End: 2022-09-04 | Stop reason: HOSPADM

## 2022-09-03 RX ORDER — SODIUM CHLORIDE 9 MG/ML
INJECTION, SOLUTION INTRAVENOUS CONTINUOUS PRN
Status: CANCELLED | OUTPATIENT
Start: 2022-09-04

## 2022-09-03 RX ORDER — PROCHLORPERAZINE EDISYLATE 5 MG/ML
10 INJECTION INTRAMUSCULAR; INTRAVENOUS EVERY 6 HOURS PRN
Status: DISCONTINUED | OUTPATIENT
Start: 2022-09-03 | End: 2022-09-04 | Stop reason: HOSPADM

## 2022-09-03 RX ORDER — SODIUM CHLORIDE 9 MG/ML
INJECTION, SOLUTION INTRAVENOUS CONTINUOUS PRN
Status: DISCONTINUED | OUTPATIENT
Start: 2022-09-03 | End: 2022-09-04 | Stop reason: HOSPADM

## 2022-09-03 RX ORDER — POTASSIUM CHLORIDE 29.8 MG/ML
80 INJECTION INTRAVENOUS PRN
Status: CANCELLED | OUTPATIENT
Start: 2022-09-04

## 2022-09-03 RX ORDER — ONDANSETRON 2 MG/ML
8 INJECTION INTRAMUSCULAR; INTRAVENOUS EVERY 8 HOURS PRN
Status: CANCELLED | OUTPATIENT
Start: 2022-09-04

## 2022-09-03 RX ORDER — DIMETHICONE, CAMPHOR (SYNTHETIC), MENTHOL, AND PHENOL 1.1; .5; .625; .5 G/100G; G/100G; G/100G; G/100G
OINTMENT TOPICAL PRN
Status: DISCONTINUED | OUTPATIENT
Start: 2022-09-03 | End: 2022-09-04 | Stop reason: HOSPADM

## 2022-09-03 RX ORDER — ONDANSETRON 4 MG/1
8 TABLET, FILM COATED ORAL EVERY 8 HOURS PRN
Status: CANCELLED | OUTPATIENT
Start: 2022-09-04

## 2022-09-03 RX ORDER — PROCHLORPERAZINE EDISYLATE 5 MG/ML
10 INJECTION INTRAMUSCULAR; INTRAVENOUS EVERY 6 HOURS PRN
Status: CANCELLED | OUTPATIENT
Start: 2022-09-04

## 2022-09-03 RX ORDER — PETROLATUM, MENTHOL, UNSPECIFIED FORM, CAMPHOR (SYNTHETIC), AND PHENOL 59.14; 1; 1; .6 G/100G; G/100G; G/100G; G/100G
PASTE TOPICAL PRN
Status: CANCELLED | OUTPATIENT
Start: 2022-09-04

## 2022-09-03 RX ORDER — PROCHLORPERAZINE MALEATE 10 MG
10 TABLET ORAL EVERY 6 HOURS
Qty: 120 TABLET | Refills: 3
Start: 2022-09-03

## 2022-09-03 RX ORDER — PROCHLORPERAZINE MALEATE 10 MG
10 TABLET ORAL EVERY 6 HOURS PRN
Status: DISCONTINUED | OUTPATIENT
Start: 2022-09-03 | End: 2022-09-04 | Stop reason: HOSPADM

## 2022-09-03 RX ORDER — OXYCODONE HYDROCHLORIDE 5 MG/1
5 TABLET ORAL EVERY 4 HOURS PRN
Status: DISCONTINUED | OUTPATIENT
Start: 2022-09-03 | End: 2022-09-04 | Stop reason: HOSPADM

## 2022-09-03 RX ORDER — 0.9 % SODIUM CHLORIDE 0.9 %
1000 INTRAVENOUS SOLUTION INTRAVENOUS ONCE
Status: COMPLETED | OUTPATIENT
Start: 2022-09-03 | End: 2022-09-03

## 2022-09-03 RX ORDER — OXYCODONE HYDROCHLORIDE 5 MG/1
10 TABLET ORAL EVERY 4 HOURS PRN
Status: CANCELLED | OUTPATIENT
Start: 2022-09-04

## 2022-09-03 RX ORDER — OXYCODONE HYDROCHLORIDE 5 MG/1
5 TABLET ORAL EVERY 4 HOURS PRN
Status: CANCELLED | OUTPATIENT
Start: 2022-09-04

## 2022-09-03 RX ORDER — ONDANSETRON HYDROCHLORIDE 8 MG/1
8 TABLET, FILM COATED ORAL EVERY 8 HOURS
Qty: 30 TABLET | Refills: 2
Start: 2022-09-03 | End: 2022-09-13

## 2022-09-03 RX ORDER — HEPARIN SODIUM (PORCINE) LOCK FLUSH IV SOLN 100 UNIT/ML 100 UNIT/ML
500 SOLUTION INTRAVENOUS PRN
Status: DISCONTINUED | OUTPATIENT
Start: 2022-09-03 | End: 2022-09-04 | Stop reason: HOSPADM

## 2022-09-03 RX ADMIN — ONDANSETRON HYDROCHLORIDE 8 MG: 2 INJECTION, SOLUTION INTRAMUSCULAR; INTRAVENOUS at 09:41

## 2022-09-03 RX ADMIN — SODIUM CHLORIDE 1000 ML: 9 INJECTION, SOLUTION INTRAVENOUS at 08:36

## 2022-09-03 RX ADMIN — SODIUM CHLORIDE, PRESERVATIVE FREE 500 UNITS: 5 INJECTION INTRAVENOUS at 10:36

## 2022-09-03 NOTE — PROGRESS NOTES
[address] [facility]  Autologous Progress Note    9/3/2022    Esther Vanessa    :  1975    MRN:  [id number]    Referring MD: MD Pepper Blevins 1945,  [address]      Subjective:  Nausea without vomiting. ECOG PS:  (1) Restricted in physically strenuous activity, ambulatory and able to do work of light nature    KPS: 80% Normal activity with effort; some signs or symptoms of disease        Medications       Medication List        CHANGE how you take these medications      ondansetron 8 MG tablet  Commonly known as: Zofran  Take 1 tablet by mouth every 8 (eight) hours for 10 days  What changed:   when to take this  reasons to take this     prochlorperazine 10 MG tablet  Commonly known as: COMPAZINE  Take 1 tablet by mouth in the morning and 1 tablet at noon and 1 tablet in the evening and 1 tablet before bedtime. What changed:   when to take this  reasons to take this            CONTINUE taking these medications      acyclovir 800 MG tablet  Commonly known as: Zovirax  Take 1 tablet by mouth 2 times daily     fluconazole 200 MG tablet  Commonly known as: Diflucan  Take 2 tablets by mouth daily     gabapentin 300 MG capsule  Commonly known as: NEURONTIN     lisinopril 20 MG tablet  Commonly known as: PRINIVIL;ZESTRIL  Take 1 tablet by mouth daily     OLANZapine 5 MG tablet  Commonly known as: ZyPREXA  Take 1 tablet by mouth nightly     pantoprazole 40 MG tablet  Commonly known as: PROTONIX  Take 1 tablet by mouth in the morning and 1 tablet in the evening.             STOP taking these medications      atorvastatin 20 MG tablet  Commonly known as: LIPITOR     dicyclomine 10 MG capsule  Commonly known as: Bentyl     levoFLOXacin 500 MG tablet  Commonly known as: Levaquin     Vitamin C 500 MG Caps     VITAMIN D PO               Where to Get Your Medications        Information about where to get these medications is not yet available    Ask your nurse or doctor about these AND PLAN:            1. IgG lambda myeloma, ISS stage II: VGPR  - Bone marrow biopsy (3/10/2022): showed 70% cellularity with plasma cells estimated to account for between 30-50%. 46,XY[20]. Myeloma ROBIN panel showed three copies of CCND1 (11q13. 3) in 83% of cells, four copies of CCND1 (11q13. 3) in 5% of cells, three copies of MAF (16q23.2) in 54% of cells, and monosomy 13 in 9% of cells. - PET/CT (3/9/22) - showed no evidence of hypermetabolism. - [id number] & ASCT (8/30/22) w/ 4.84 x 10^6 CD34+ cells/kg     Emend Hypersensitivity 8/29/22 including facial flushing, stomach cramping. D/C'ed and started PO Zyprexa 10 mg once 8/29/22 then 5 mg PO daily for 3 more days      Day + 4     2. ID:  Afebrile, no evidence of infection.    - Cont Acyclovir & Diflucan ppx  - Start Levaquin when ANC < 1.5       3. Heme: Anemia & thrombocytopenia d/t recent chemotherapy    - Transfuse for Hgb < 7 and Platelets < 43S  - No transfusion today  - Start daily G-CSF Day + 5 (9/4/22)     4. Metabolic: Mild Hyperglycemia & hypoNa w/ stable renal fxn stable. - Cont IVF hydration: 1 L NS each day in OP Infusion  - Replace potassium and magnesium per policy. 5. Nutrition / GI:    - Hx positive H. pylori breath   - EGD & Colonoscopy Bx (8/18/22) - negative   Nutrition:  Appetite and oral intake is okay  - Cont low microbial diet   - Follow with dietary  GI: Persistent nausea   - Cont PPI BID   - Cont Zofran q8hrs (scheduled 8/31/22) and Compazine q6hrs (scheduled 9/1/22) for nausea / vomiting   - Cont Zyprexa 5 mg nightly; consider increasing to 10 mg nightly if nausea persists. - may need Ativan for nausea as anxiety may be contributing      6. Pulmonary: No active issues   - DLCO of 75% of predicted finding on ASCT workup: No obvious etiology for this  - CXR (8/29/22): Negative for acute disease     7. Prevention of SRE:    - PET/CT (3/9/22) - without bony disease.    - Hold off on a bisphosphonate.      8. Peripheral Neuropathy: - Cont gabapentin 300 mg nighty (Consider increasing dose if symptoms persist)     9. Cardiac:    - H/o HTN  HTN:  stable   - Cont Lisinopril 20 mg daily, stop with hypotension 9/3/22     - Disposition: Return to OP Infusion daily for toxicity assessment, labs and MD visit.       The patient was seen and examined by Dr. Kinga Rice MD  [facility]  Please contact me through [address]

## 2022-09-03 NOTE — PROGRESS NOTES
Short Stay Communication Note  Rhianna Low  Diagnosis: Multiple Myeloma  Primary MD: Dr. Delvin Billy  Treatment: Melphalan   Day +4 of Auto Transplant   Pt seen in outpatient infusion today. Labs drawn and reviewed. CBC:   Recent Labs     09/01/22  0835 09/02/22  0851 09/03/22  0834   WBC 5.1 2.9* 2.4*   HGB 13.5 13.3* 13.6   HCT 39.8* 40.2* 40.6   MCV 94.1 94.0 93.9   PLT 97* 92* 79*       BMP/Mag:  Recent Labs     09/01/22  0835 09/02/22  0851 09/03/22  0834   * 137 135*   K 3.8 4.1 4.3   CL 99 99 99   CO2 30 29 31   PHOS 3.7 3.8 4.0   BUN 13 12 12   CREATININE 0.8* 0.8* 0.8*   MG  --  1.80  --        Standing parameters for replacement for this patient:   1 unit of pack red blood cells for a hemoglobin < or equal to 7.0  1 pack of platelets for a platelet count less than or equal to 10.0  40 MeQ of Potassium administered for a potassium level less than or equal to 3.4  4g of Magnesium Sulfate for a magnesum level less than or equal to 1.4  No transfusions required for the above lab values. Urinalysis last done: 8/30/2022 Urinalysis next due: 9/5/2022    Chest X-Ray last done: 8/30/2022 Chest X-Ray next due: 9/5/2022    Symptoms addressed and reported to care team this date:   Treatments this date: IV Fluids, IV zofran 8mg    Reviewed medication schedule with pt and caregiver. Both able to verbalize all medications and schedule. Pt to be seen again tomorrow. Patient and caregiver verbalized understanding of discharge instructions including when and how to call the doctor and when to report  to the ER. Discharged ambulatory to home. CVC line care completed, flushed, heparin locked.      Electronically signed by Bradley Kern RN on 9/3/2022 at 10:07 AM'

## 2022-09-04 ENCOUNTER — HOSPITAL ENCOUNTER (OUTPATIENT)
Dept: ONCOLOGY | Age: 47
Setting detail: INFUSION SERIES
Discharge: HOME | End: 2022-09-04
Payer: COMMERCIAL

## 2022-09-04 VITALS
SYSTOLIC BLOOD PRESSURE: 94 MMHG | TEMPERATURE: 97.8 F | BODY MASS INDEX: 22.1 KG/M2 | WEIGHT: 136.91 LBS | RESPIRATION RATE: 16 BRPM | OXYGEN SATURATION: 99 % | HEART RATE: 107 BPM | DIASTOLIC BLOOD PRESSURE: 63 MMHG

## 2022-09-04 DIAGNOSIS — C90.00 MULTIPLE MYELOMA NOT HAVING ACHIEVED REMISSION (HCC): Primary | ICD-10-CM

## 2022-09-04 LAB
ANION GAP SERPL CALCULATED.3IONS-SCNC: 7 MMOL/L (ref 3–16)
BASOPHILS ABSOLUTE: 0 K/UL (ref 0–0.2)
BASOPHILS RELATIVE PERCENT: 0.3 %
BUN BLDV-MCNC: 12 MG/DL (ref 7–20)
CALCIUM SERPL-MCNC: 9.2 MG/DL (ref 8.3–10.6)
CHLORIDE BLD-SCNC: 99 MMOL/L (ref 99–110)
CO2: 30 MMOL/L (ref 21–32)
CREAT SERPL-MCNC: 0.7 MG/DL (ref 0.9–1.3)
EOSINOPHILS ABSOLUTE: 0 K/UL (ref 0–0.6)
EOSINOPHILS RELATIVE PERCENT: 0.4 %
GFR AFRICAN AMERICAN: >60
GFR NON-AFRICAN AMERICAN: >60
GLUCOSE BLD-MCNC: 114 MG/DL (ref 70–99)
HCT VFR BLD CALC: 41.4 % (ref 40.5–52.5)
HEMOGLOBIN: 14.2 G/DL (ref 13.5–17.5)
LYMPHOCYTES ABSOLUTE: 0 K/UL (ref 1–5.1)
LYMPHOCYTES RELATIVE PERCENT: 1.9 %
MCH RBC QN AUTO: 31.9 PG (ref 26–34)
MCHC RBC AUTO-ENTMCNC: 34.2 G/DL (ref 31–36)
MCV RBC AUTO: 93.3 FL (ref 80–100)
MONOCYTES ABSOLUTE: 0 K/UL (ref 0–1.3)
MONOCYTES RELATIVE PERCENT: 2.2 %
NEUTROPHILS ABSOLUTE: 1.6 K/UL (ref 1.7–7.7)
NEUTROPHILS RELATIVE PERCENT: 95.2 %
PDW BLD-RTO: 12.9 % (ref 12.4–15.4)
PHOSPHORUS: 4.8 MG/DL (ref 2.5–4.9)
PLATELET # BLD: 64 K/UL (ref 135–450)
PLATELET SLIDE REVIEW: ADEQUATE
PMV BLD AUTO: 7.9 FL (ref 5–10.5)
POTASSIUM SERPL-SCNC: 4.3 MMOL/L (ref 3.5–5.1)
RBC # BLD: 4.44 M/UL (ref 4.2–5.9)
SLIDE REVIEW: ABNORMAL
SODIUM BLD-SCNC: 136 MMOL/L (ref 136–145)
WBC # BLD: 1.7 K/UL (ref 4–11)

## 2022-09-04 PROCEDURE — 6360000002 HC RX W HCPCS: Performed by: NURSE PRACTITIONER

## 2022-09-04 PROCEDURE — 96372 THER/PROPH/DIAG INJ SC/IM: CPT

## 2022-09-04 PROCEDURE — 6360000002 HC RX W HCPCS: Performed by: INTERNAL MEDICINE

## 2022-09-04 PROCEDURE — 80048 BASIC METABOLIC PNL TOTAL CA: CPT

## 2022-09-04 PROCEDURE — 96360 HYDRATION IV INFUSION INIT: CPT

## 2022-09-04 PROCEDURE — 2580000003 HC RX 258: Performed by: INTERNAL MEDICINE

## 2022-09-04 PROCEDURE — 85025 COMPLETE CBC W/AUTO DIFF WBC: CPT

## 2022-09-04 PROCEDURE — 96374 THER/PROPH/DIAG INJ IV PUSH: CPT

## 2022-09-04 PROCEDURE — 96361 HYDRATE IV INFUSION ADD-ON: CPT

## 2022-09-04 PROCEDURE — 36592 COLLECT BLOOD FROM PICC: CPT

## 2022-09-04 PROCEDURE — 84100 ASSAY OF PHOSPHORUS: CPT

## 2022-09-04 RX ORDER — LEVOFLOXACIN 500 MG/1
500 TABLET, FILM COATED ORAL DAILY
Qty: 14 TABLET | Refills: 0
Start: 2022-09-04 | End: 2022-09-11 | Stop reason: HOSPADM

## 2022-09-04 RX ORDER — ONDANSETRON 2 MG/ML
8 INJECTION INTRAMUSCULAR; INTRAVENOUS EVERY 8 HOURS PRN
Status: CANCELLED | OUTPATIENT
Start: 2022-09-05

## 2022-09-04 RX ORDER — POTASSIUM CHLORIDE 20 MEQ/1
40 TABLET, EXTENDED RELEASE ORAL PRN
Status: CANCELLED | OUTPATIENT
Start: 2022-09-05

## 2022-09-04 RX ORDER — PROCHLORPERAZINE MALEATE 10 MG
10 TABLET ORAL EVERY 6 HOURS PRN
Status: DISCONTINUED | OUTPATIENT
Start: 2022-09-04 | End: 2022-09-05 | Stop reason: HOSPADM

## 2022-09-04 RX ORDER — SODIUM CHLORIDE 9 MG/ML
INJECTION, SOLUTION INTRAVENOUS CONTINUOUS PRN
Status: DISCONTINUED | OUTPATIENT
Start: 2022-09-04 | End: 2022-09-05 | Stop reason: HOSPADM

## 2022-09-04 RX ORDER — HEPARIN SODIUM (PORCINE) LOCK FLUSH IV SOLN 100 UNIT/ML 100 UNIT/ML
500 SOLUTION INTRAVENOUS PRN
Status: CANCELLED | OUTPATIENT
Start: 2022-09-05

## 2022-09-04 RX ORDER — POTASSIUM CHLORIDE 29.8 MG/ML
20 INJECTION INTRAVENOUS PRN
Status: DISCONTINUED | OUTPATIENT
Start: 2022-09-04 | End: 2022-09-05 | Stop reason: HOSPADM

## 2022-09-04 RX ORDER — ONDANSETRON 4 MG/1
8 TABLET, FILM COATED ORAL EVERY 8 HOURS PRN
Status: CANCELLED | OUTPATIENT
Start: 2022-09-05

## 2022-09-04 RX ORDER — SODIUM CHLORIDE 9 MG/ML
INJECTION, SOLUTION INTRAVENOUS CONTINUOUS PRN
Status: CANCELLED | OUTPATIENT
Start: 2022-09-05

## 2022-09-04 RX ORDER — OXYCODONE HYDROCHLORIDE 5 MG/1
10 TABLET ORAL EVERY 4 HOURS PRN
Status: CANCELLED | OUTPATIENT
Start: 2022-09-05

## 2022-09-04 RX ORDER — 0.9 % SODIUM CHLORIDE 0.9 %
1000 INTRAVENOUS SOLUTION INTRAVENOUS ONCE
Status: CANCELLED | OUTPATIENT
Start: 2022-09-05 | End: 2022-09-05

## 2022-09-04 RX ORDER — PROCHLORPERAZINE EDISYLATE 5 MG/ML
10 INJECTION INTRAMUSCULAR; INTRAVENOUS EVERY 6 HOURS PRN
Status: DISCONTINUED | OUTPATIENT
Start: 2022-09-04 | End: 2022-09-05 | Stop reason: HOSPADM

## 2022-09-04 RX ORDER — MAGNESIUM SULFATE IN WATER 40 MG/ML
4000 INJECTION, SOLUTION INTRAVENOUS PRN
Status: DISCONTINUED | OUTPATIENT
Start: 2022-09-04 | End: 2022-09-05 | Stop reason: HOSPADM

## 2022-09-04 RX ORDER — HEPARIN SODIUM (PORCINE) LOCK FLUSH IV SOLN 100 UNIT/ML 100 UNIT/ML
500 SOLUTION INTRAVENOUS PRN
Status: DISCONTINUED | OUTPATIENT
Start: 2022-09-04 | End: 2022-09-05 | Stop reason: HOSPADM

## 2022-09-04 RX ORDER — OXYCODONE HYDROCHLORIDE 5 MG/1
10 TABLET ORAL EVERY 4 HOURS PRN
Status: DISCONTINUED | OUTPATIENT
Start: 2022-09-04 | End: 2022-09-05 | Stop reason: HOSPADM

## 2022-09-04 RX ORDER — PROCHLORPERAZINE EDISYLATE 5 MG/ML
10 INJECTION INTRAMUSCULAR; INTRAVENOUS EVERY 6 HOURS PRN
Status: CANCELLED | OUTPATIENT
Start: 2022-09-05

## 2022-09-04 RX ORDER — POTASSIUM CHLORIDE 29.8 MG/ML
80 INJECTION INTRAVENOUS PRN
Status: CANCELLED | OUTPATIENT
Start: 2022-09-05

## 2022-09-04 RX ORDER — ONDANSETRON 2 MG/ML
8 INJECTION INTRAMUSCULAR; INTRAVENOUS EVERY 8 HOURS PRN
Status: DISCONTINUED | OUTPATIENT
Start: 2022-09-04 | End: 2022-09-05 | Stop reason: HOSPADM

## 2022-09-04 RX ORDER — OXYCODONE HYDROCHLORIDE 5 MG/1
5 TABLET ORAL EVERY 4 HOURS PRN
Status: CANCELLED | OUTPATIENT
Start: 2022-09-05

## 2022-09-04 RX ORDER — OXYCODONE HYDROCHLORIDE 5 MG/1
5 TABLET ORAL EVERY 4 HOURS PRN
Status: DISCONTINUED | OUTPATIENT
Start: 2022-09-04 | End: 2022-09-05 | Stop reason: HOSPADM

## 2022-09-04 RX ORDER — 0.9 % SODIUM CHLORIDE 0.9 %
1000 INTRAVENOUS SOLUTION INTRAVENOUS ONCE
Status: COMPLETED | OUTPATIENT
Start: 2022-09-04 | End: 2022-09-04

## 2022-09-04 RX ORDER — MAGNESIUM SULFATE IN WATER 40 MG/ML
4000 INJECTION, SOLUTION INTRAVENOUS PRN
Status: CANCELLED | OUTPATIENT
Start: 2022-09-05

## 2022-09-04 RX ORDER — PROCHLORPERAZINE MALEATE 10 MG
10 TABLET ORAL EVERY 6 HOURS PRN
Status: CANCELLED | OUTPATIENT
Start: 2022-09-05

## 2022-09-04 RX ORDER — PETROLATUM, MENTHOL, UNSPECIFIED FORM, CAMPHOR (SYNTHETIC), AND PHENOL 59.14; 1; 1; .6 G/100G; G/100G; G/100G; G/100G
PASTE TOPICAL PRN
Status: CANCELLED | OUTPATIENT
Start: 2022-09-05

## 2022-09-04 RX ORDER — POTASSIUM CHLORIDE 20 MEQ/1
40 TABLET, EXTENDED RELEASE ORAL PRN
Status: DISCONTINUED | OUTPATIENT
Start: 2022-09-04 | End: 2022-09-05 | Stop reason: HOSPADM

## 2022-09-04 RX ORDER — DIMETHICONE, CAMPHOR (SYNTHETIC), MENTHOL, AND PHENOL 1.1; .5; .625; .5 G/100G; G/100G; G/100G; G/100G
OINTMENT TOPICAL PRN
Status: DISCONTINUED | OUTPATIENT
Start: 2022-09-04 | End: 2022-09-05 | Stop reason: HOSPADM

## 2022-09-04 RX ORDER — ONDANSETRON 4 MG/1
8 TABLET, FILM COATED ORAL EVERY 8 HOURS PRN
Status: DISCONTINUED | OUTPATIENT
Start: 2022-09-04 | End: 2022-09-05 | Stop reason: HOSPADM

## 2022-09-04 RX ADMIN — FILGRASTIM-AAFI 300 MCG: 300 INJECTION, SOLUTION SUBCUTANEOUS at 10:26

## 2022-09-04 RX ADMIN — SODIUM CHLORIDE, PRESERVATIVE FREE 500 UNITS: 5 INJECTION INTRAVENOUS at 10:30

## 2022-09-04 RX ADMIN — PROCHLORPERAZINE EDISYLATE 10 MG: 5 INJECTION INTRAMUSCULAR; INTRAVENOUS at 08:37

## 2022-09-04 RX ADMIN — SODIUM CHLORIDE, PRESERVATIVE FREE 500 UNITS: 5 INJECTION INTRAVENOUS at 10:26

## 2022-09-04 RX ADMIN — SODIUM CHLORIDE 1000 ML: 9 INJECTION, SOLUTION INTRAVENOUS at 08:36

## 2022-09-04 NOTE — PROGRESS NOTES
[address] [facility]  Autologous Progress Note    2022    Cheryle Fall    :  1975    MRN:  [id number]    Referring MD: Rashel Ignacio MD  [address], [address], [address],  [address]      Subjective:  Persistent nausea. Vomited once yesterday. No diarrhea or abdominal pain. Ate essentially nothing yesterday and slept almost the entire day. ECOG PS:  (1) Restricted in physically strenuous activity, ambulatory and able to do work of light nature    KPS: 80% Normal activity with effort; some signs or symptoms of disease        Medications       Medication List        START taking these medications      levoFLOXacin 500 MG tablet  Commonly known as: LEVAQUIN  Take 1 tablet by mouth daily            CONTINUE taking these medications      acyclovir 800 MG tablet  Commonly known as: Zovirax  Take 1 tablet by mouth 2 times daily     fluconazole 200 MG tablet  Commonly known as: Diflucan  Take 2 tablets by mouth daily     gabapentin 300 MG capsule  Commonly known as: NEURONTIN     OLANZapine 5 MG tablet  Commonly known as: ZyPREXA  Take 1 tablet by mouth nightly     ondansetron 8 MG tablet  Commonly known as: Zofran  Take 1 tablet by mouth every 8 (eight) hours for 10 days     pantoprazole 40 MG tablet  Commonly known as: PROTONIX  Take 1 tablet by mouth in the morning and 1 tablet in the evening. prochlorperazine 10 MG tablet  Commonly known as: COMPAZINE  Take 1 tablet by mouth in the morning and 1 tablet at noon and 1 tablet in the evening and 1 tablet before bedtime.             STOP taking these medications      atorvastatin 20 MG tablet  Commonly known as: LIPITOR     dicyclomine 10 MG capsule  Commonly known as: Bentyl     lisinopril 20 MG tablet  Commonly known as: PRINIVIL;ZESTRIL     Vitamin C 500 MG Caps     VITAMIN D PO               Where to Get Your Medications        Information about where to get these medications is not yet available    Ask your nurse or doctor about these medications  levoFLOXacin 500 MG tablet        ROS:  As noted above, otherwise remainder of 10-point ROS negative    Physical Exam:     I&O:  No intake or output data in the 24 hours ending 09/04/22 0920      Vital Signs:  BP 94/63   Pulse (!) 107   Temp 97.8 °F (36.6 °C) (Oral)   Resp 16   Wt 136 lb 14.5 oz (62.1 kg)   SpO2 99%   BMI 22.10 kg/m²     Weight:    Wt Readings from Last 3 Encounters:   09/04/22 136 lb 14.5 oz (62.1 kg)   09/02/22 139 lb 15.9 oz (63.5 kg)   09/01/22 143 lb 8.3 oz (65.1 kg)       General: Awake, alert and oriented. HEENT: normocephalic, alopecia, PERRL, no scleral erythema or icterus, Oral mucosa moist and intact, throat clear  NECK: supple without palpable adenopathy  BACK: Straight negative CVAT  SKIN: warm dry and intact without lesions rashes or masses  CHEST: CTA bilaterally without use of accessory muscles  CV: Normal S1 S2, RRR, no MRG  ABD: NT ND normoactive BS, no palpable masses or hepatosplenomegaly  EXTREMITIES: without edema, denies calf tenderness  NEURO: CN II - XII grossly intact  CATHETER: Right Subclavian Trifusion (IR: Dianne, 7/22/22): CDI      Data:   CBC:   Recent Labs     09/02/22  0851 09/03/22  0834 09/04/22  0837   WBC 2.9* 2.4* 1.7*   HGB 13.3* 13.6 14.2   HCT 40.2* 40.6 41.4   MCV 94.0 93.9 93.3   PLT 92* 79* 64*     BMP/Mag:  Recent Labs     09/02/22  0851 09/03/22  0834 09/04/22  0837    135* 136   K 4.1 4.3 4.3   CL 99 99 99   CO2 29 31 30   PHOS 3.8 4.0 4.8   BUN 12 12 12   CREATININE 0.8* 0.8* 0.7*   MG 1.80  --   --      LIVP:   Recent Labs     09/02/22  0851   AST 18   ALT 13   BILIDIR <0.2   BILITOT 0.9   ALKPHOS 76     Uric Acid:    Recent Labs     09/02/22  0851   LABURIC 2.3*     Coags:   No results for input(s): PROTIME, INR, APTT in the last 72 hours.     PROBLEM LIST:            Multiple Myeloma  HTN  HLD  Positive H. pylori breath test  Emend hypersensitivity rxn (8/2022)  CINV      TREATMENT:            RVd on (4/11/22) x 4 cycles [id number] & ASCT (8/30/22) w/ 4.84 x 10^6 CD34+ cells/kg     ASSESSMENT AND PLAN:            1. IgG lambda myeloma, ISS stage II: VGPR  - Bone marrow biopsy (3/10/2022): showed 70% cellularity with plasma cells estimated to account for between 30-50%. 46,XY[20]. Myeloma ROBIN panel showed three copies of CCND1 (11q13. 3) in 83% of cells, four copies of CCND1 (11q13. 3) in 5% of cells, three copies of MAF (16q23.2) in 54% of cells, and monosomy 13 in 9% of cells. - PET/CT (3/9/22) - showed no evidence of hypermetabolism. - [id number] & ASCT (8/30/22) w/ 4.84 x 10^6 CD34+ cells/kg     Emend Hypersensitivity 8/29/22 including facial flushing, stomach cramping. D/C'ed and started PO Zyprexa 10 mg once 8/29/22 then 5 mg PO daily for 3 more days, extended an additional 3 days. Day + 5     2. ID:  Afebrile, no evidence of infection.    - Cont Acyclovir & Diflucan ppx  - Start Levaquin (9/4/22)hs been taking since 8/30/22  Will brin in meds 9/5 for review. 3.  Heme: Anemia & thrombocytopenia d/t recent chemotherapy    - Transfuse for Hgb < 7 and Platelets < 18E  - No transfusion today  - Start daily G-CSF Day + 5 (9/4/22)     4. Metabolic: Mild Hyperglycemia & hypoNa w/ stable renal fxn stable. - Cont IVF hydration: 1 L NS each day in OP Infusion  - Replace potassium and magnesium per policy. 5. Nutrition / GI:    - Hx positive H. pylori breath   - EGD & Colonoscopy Bx (8/18/22) - negative   Nutrition:  Appetite and oral intake is okay  - Cont low microbial diet   - Follow with dietary  GI: Persistent nausea   - Cont PPI BID   - Cont Zofran q8hrs (scheduled 8/31/22) and Compazine q6hrs (scheduled 9/1/22) for nausea / vomiting   - Cont Zyprexa 5 mg nightly; consider increasing to 10 mg nightly if nausea persists. - may need Ativan for nausea as anxiety may be contributing      6.  Pulmonary: No active issues   - DLCO of 75% of predicted finding on ASCT workup: No obvious etiology for this  - CXR (8/29/22): Negative for acute disease     7. Prevention of SRE:    - PET/CT (3/9/22) - without bony disease.    - Hold off on a bisphosphonate. 8. Peripheral Neuropathy:   - Cont gabapentin 300 mg nighty (Consider increasing dose if symptoms persist)     9. Cardiac:    - H/o HTN  HTN:  stable   - S/p Lisinopril 20 mg daily (stopped 9/3/22)       - Disposition: Return to OP Infusion daily for toxicity assessment, labs and MD visit.       The patient was seen and examined by Dr. Moni Gonzales MD  [facility]  Please contact me through [facility]

## 2022-09-04 NOTE — PROGRESS NOTES
Short Stay Communication Note  Sanket Huitron  Diagnosis: Multiple Myeloma  Primary MD: Dr. Sara García  Treatment: Melphalan   Day +5 of Auto Transplant   Pt seen in outpatient infusion today. Labs drawn and reviewed. CBC:   Recent Labs     09/02/22 0851 09/03/22  0834 09/04/22  0837   WBC 2.9* 2.4* 1.7*   HGB 13.3* 13.6 14.2   HCT 40.2* 40.6 41.4   MCV 94.0 93.9 93.3   PLT 92* 79* 64*     BMP/Mag:  Recent Labs     09/02/22  0851 09/03/22  0834 09/04/22  0837    135* 136   K 4.1 4.3 4.3   CL 99 99 99   CO2 29 31 30   PHOS 3.8 4.0 4.8   BUN 12 12 12   CREATININE 0.8* 0.8* 0.7*   MG 1.80  --   --      Standing parameters for replacement for this patient:   1 unit of pack red blood cells for a hemoglobin < or equal to 7.0  1 pack of platelets for a platelet count less than or equal to 10.0  40 MeQ of Potassium administered for a potassium level less than or equal to 3.4  4g of Magnesium Sulfate for a magnesum level less than or equal to 1.4  No transfusions required for the above lab values. Urinalysis last done: 8/30/2022 Urinalysis next due: 9/5/2022    Chest X-Ray last done: 8/30/2022 Chest X-Ray next due: 9/5/2022    Symptoms addressed and reported to care team this date: nausea, loss of appetite  Treatments this date: IV Fluids, IV compazine, Labs, Granix    Reviewed medication schedule with pt and caregiver. Both able to verbalize all medications and schedule. Pt to be seen again tomorrow. Patient and caregiver verbalized understanding of discharge instructions including when and how to call the doctor and when to report  to the ER. Discharged ambulatory to home. CVC line care completed, flushed, heparin locked.      Electronically signed by Margo Lu RN on 9/4/2022 at 10:06 AM'

## 2022-09-05 ENCOUNTER — HOSPITAL ENCOUNTER (OUTPATIENT)
Dept: ONCOLOGY | Age: 47
Setting detail: INFUSION SERIES
Discharge: HOME | End: 2022-09-05
Payer: COMMERCIAL

## 2022-09-05 ENCOUNTER — HOSPITAL ENCOUNTER (OUTPATIENT)
Dept: GENERAL RADIOLOGY | Age: 47
Discharge: HOME | End: 2022-09-05
Payer: COMMERCIAL

## 2022-09-05 VITALS
TEMPERATURE: 98.1 F | WEIGHT: 136.24 LBS | SYSTOLIC BLOOD PRESSURE: 91 MMHG | RESPIRATION RATE: 16 BRPM | BODY MASS INDEX: 21.99 KG/M2 | OXYGEN SATURATION: 99 % | HEART RATE: 83 BPM | DIASTOLIC BLOOD PRESSURE: 64 MMHG

## 2022-09-05 DIAGNOSIS — C90.00 MULTIPLE MYELOMA NOT HAVING ACHIEVED REMISSION (HCC): Primary | ICD-10-CM

## 2022-09-05 LAB
ALBUMIN SERPL-MCNC: 4.2 G/DL (ref 3.4–5)
ALP BLD-CCNC: 84 U/L (ref 40–129)
ALT SERPL-CCNC: 11 U/L (ref 10–40)
ANION GAP SERPL CALCULATED.3IONS-SCNC: 10 MMOL/L (ref 3–16)
AST SERPL-CCNC: 16 U/L (ref 15–37)
BASOPHILS ABSOLUTE: 0 K/UL (ref 0–0.2)
BASOPHILS RELATIVE PERCENT: 0.4 %
BILIRUB SERPL-MCNC: 0.7 MG/DL (ref 0–1)
BILIRUBIN DIRECT: <0.2 MG/DL (ref 0–0.3)
BILIRUBIN URINE: NEGATIVE
BILIRUBIN, INDIRECT: ABNORMAL MG/DL (ref 0–1)
BLOOD, URINE: NEGATIVE
BUN BLDV-MCNC: 14 MG/DL (ref 7–20)
CALCIUM SERPL-MCNC: 8.9 MG/DL (ref 8.3–10.6)
CHLORIDE BLD-SCNC: 98 MMOL/L (ref 99–110)
CLARITY: CLEAR
CO2: 28 MMOL/L (ref 21–32)
COLOR: YELLOW
CREAT SERPL-MCNC: 0.8 MG/DL (ref 0.9–1.3)
EOSINOPHILS ABSOLUTE: 0 K/UL (ref 0–0.6)
EOSINOPHILS RELATIVE PERCENT: 0.2 %
GFR AFRICAN AMERICAN: >60
GFR NON-AFRICAN AMERICAN: >60
GLUCOSE BLD-MCNC: 104 MG/DL (ref 70–99)
GLUCOSE URINE: NEGATIVE MG/DL
HCT VFR BLD CALC: 37.7 % (ref 40.5–52.5)
HEMOGLOBIN: 13.1 G/DL (ref 13.5–17.5)
INR BLD: 1.12 (ref 0.87–1.14)
KETONES, URINE: ABNORMAL MG/DL
LACTATE DEHYDROGENASE: 158 U/L (ref 100–190)
LEUKOCYTE ESTERASE, URINE: NEGATIVE
LYMPHOCYTES ABSOLUTE: 0 K/UL (ref 1–5.1)
LYMPHOCYTES RELATIVE PERCENT: 1.7 %
MAGNESIUM: 1.7 MG/DL (ref 1.8–2.4)
MCH RBC QN AUTO: 32 PG (ref 26–34)
MCHC RBC AUTO-ENTMCNC: 34.7 G/DL (ref 31–36)
MCV RBC AUTO: 92.3 FL (ref 80–100)
MICROSCOPIC EXAMINATION: ABNORMAL
MONOCYTES ABSOLUTE: 0 K/UL (ref 0–1.3)
MONOCYTES RELATIVE PERCENT: 3.3 %
NEUTROPHILS ABSOLUTE: 1.1 K/UL (ref 1.7–7.7)
NEUTROPHILS RELATIVE PERCENT: 94.4 %
NITRITE, URINE: NEGATIVE
PDW BLD-RTO: 12.4 % (ref 12.4–15.4)
PH UA: 6 (ref 5–8)
PHOSPHORUS: 4.4 MG/DL (ref 2.5–4.9)
PLATELET # BLD: 38 K/UL (ref 135–450)
PMV BLD AUTO: 7.6 FL (ref 5–10.5)
POTASSIUM SERPL-SCNC: 4.4 MMOL/L (ref 3.5–5.1)
PROTEIN UA: NEGATIVE MG/DL
PROTHROMBIN TIME: 14.3 SEC (ref 11.7–14.5)
RBC # BLD: 4.08 M/UL (ref 4.2–5.9)
SODIUM BLD-SCNC: 136 MMOL/L (ref 136–145)
SPECIFIC GRAVITY UA: 1.02 (ref 1–1.03)
TOTAL PROTEIN: 5.9 G/DL (ref 6.4–8.2)
URIC ACID, SERUM: 2.1 MG/DL (ref 3.5–7.2)
URINE TYPE: ABNORMAL
UROBILINOGEN, URINE: 0.2 E.U./DL
WBC # BLD: 1.2 K/UL (ref 4–11)

## 2022-09-05 PROCEDURE — 80048 BASIC METABOLIC PNL TOTAL CA: CPT

## 2022-09-05 PROCEDURE — 71045 X-RAY EXAM CHEST 1 VIEW: CPT

## 2022-09-05 PROCEDURE — 96372 THER/PROPH/DIAG INJ SC/IM: CPT

## 2022-09-05 PROCEDURE — 85025 COMPLETE CBC W/AUTO DIFF WBC: CPT

## 2022-09-05 PROCEDURE — 84550 ASSAY OF BLOOD/URIC ACID: CPT

## 2022-09-05 PROCEDURE — 6360000002 HC RX W HCPCS: Performed by: INTERNAL MEDICINE

## 2022-09-05 PROCEDURE — 85610 PROTHROMBIN TIME: CPT

## 2022-09-05 PROCEDURE — 36592 COLLECT BLOOD FROM PICC: CPT

## 2022-09-05 PROCEDURE — 96361 HYDRATE IV INFUSION ADD-ON: CPT

## 2022-09-05 PROCEDURE — 81003 URINALYSIS AUTO W/O SCOPE: CPT

## 2022-09-05 PROCEDURE — 83615 LACTATE (LD) (LDH) ENZYME: CPT

## 2022-09-05 PROCEDURE — 2580000003 HC RX 258: Performed by: INTERNAL MEDICINE

## 2022-09-05 PROCEDURE — 80076 HEPATIC FUNCTION PANEL: CPT

## 2022-09-05 PROCEDURE — 84100 ASSAY OF PHOSPHORUS: CPT

## 2022-09-05 PROCEDURE — 83735 ASSAY OF MAGNESIUM: CPT

## 2022-09-05 PROCEDURE — 6360000002 HC RX W HCPCS: Performed by: NURSE PRACTITIONER

## 2022-09-05 PROCEDURE — 96360 HYDRATION IV INFUSION INIT: CPT

## 2022-09-05 RX ORDER — SODIUM CHLORIDE 9 MG/ML
INJECTION, SOLUTION INTRAVENOUS CONTINUOUS PRN
Status: DISCONTINUED | OUTPATIENT
Start: 2022-09-05 | End: 2022-09-06 | Stop reason: HOSPADM

## 2022-09-05 RX ORDER — ONDANSETRON 2 MG/ML
8 INJECTION INTRAMUSCULAR; INTRAVENOUS EVERY 8 HOURS PRN
Status: CANCELLED | OUTPATIENT
Start: 2022-09-06

## 2022-09-05 RX ORDER — PROCHLORPERAZINE MALEATE 10 MG
10 TABLET ORAL EVERY 6 HOURS PRN
Status: DISCONTINUED | OUTPATIENT
Start: 2022-09-05 | End: 2022-09-06 | Stop reason: HOSPADM

## 2022-09-05 RX ORDER — SODIUM CHLORIDE 9 MG/ML
INJECTION, SOLUTION INTRAVENOUS CONTINUOUS PRN
Status: CANCELLED | OUTPATIENT
Start: 2022-09-06

## 2022-09-05 RX ORDER — DIMETHICONE, CAMPHOR (SYNTHETIC), MENTHOL, AND PHENOL 1.1; .5; .625; .5 G/100G; G/100G; G/100G; G/100G
OINTMENT TOPICAL PRN
Status: DISCONTINUED | OUTPATIENT
Start: 2022-09-05 | End: 2022-09-06 | Stop reason: HOSPADM

## 2022-09-05 RX ORDER — PROCHLORPERAZINE EDISYLATE 5 MG/ML
10 INJECTION INTRAMUSCULAR; INTRAVENOUS EVERY 6 HOURS PRN
Status: DISCONTINUED | OUTPATIENT
Start: 2022-09-05 | End: 2022-09-06 | Stop reason: HOSPADM

## 2022-09-05 RX ORDER — MAGNESIUM SULFATE IN WATER 40 MG/ML
4000 INJECTION, SOLUTION INTRAVENOUS PRN
Status: DISCONTINUED | OUTPATIENT
Start: 2022-09-05 | End: 2022-09-06 | Stop reason: HOSPADM

## 2022-09-05 RX ORDER — HEPARIN SODIUM (PORCINE) LOCK FLUSH IV SOLN 100 UNIT/ML 100 UNIT/ML
500 SOLUTION INTRAVENOUS PRN
Status: CANCELLED | OUTPATIENT
Start: 2022-09-06

## 2022-09-05 RX ORDER — ONDANSETRON 4 MG/1
8 TABLET, FILM COATED ORAL EVERY 8 HOURS PRN
Status: CANCELLED | OUTPATIENT
Start: 2022-09-06

## 2022-09-05 RX ORDER — OXYCODONE HYDROCHLORIDE 5 MG/1
5 TABLET ORAL EVERY 4 HOURS PRN
Status: CANCELLED | OUTPATIENT
Start: 2022-09-06

## 2022-09-05 RX ORDER — PROCHLORPERAZINE MALEATE 10 MG
10 TABLET ORAL EVERY 6 HOURS PRN
Status: CANCELLED | OUTPATIENT
Start: 2022-09-06

## 2022-09-05 RX ORDER — POTASSIUM CHLORIDE 20 MEQ/1
40 TABLET, EXTENDED RELEASE ORAL PRN
Status: CANCELLED | OUTPATIENT
Start: 2022-09-06

## 2022-09-05 RX ORDER — PETROLATUM, MENTHOL, UNSPECIFIED FORM, CAMPHOR (SYNTHETIC), AND PHENOL 59.14; 1; 1; .6 G/100G; G/100G; G/100G; G/100G
PASTE TOPICAL PRN
Status: CANCELLED | OUTPATIENT
Start: 2022-09-06

## 2022-09-05 RX ORDER — ONDANSETRON 4 MG/1
8 TABLET, FILM COATED ORAL EVERY 8 HOURS PRN
Status: DISCONTINUED | OUTPATIENT
Start: 2022-09-05 | End: 2022-09-06 | Stop reason: HOSPADM

## 2022-09-05 RX ORDER — ONDANSETRON 2 MG/ML
8 INJECTION INTRAMUSCULAR; INTRAVENOUS EVERY 8 HOURS PRN
Status: DISCONTINUED | OUTPATIENT
Start: 2022-09-05 | End: 2022-09-06 | Stop reason: HOSPADM

## 2022-09-05 RX ORDER — OXYCODONE HYDROCHLORIDE 5 MG/1
5 TABLET ORAL EVERY 4 HOURS PRN
Status: DISCONTINUED | OUTPATIENT
Start: 2022-09-05 | End: 2022-09-06 | Stop reason: HOSPADM

## 2022-09-05 RX ORDER — POTASSIUM CHLORIDE 29.8 MG/ML
20 INJECTION INTRAVENOUS PRN
Status: DISCONTINUED | OUTPATIENT
Start: 2022-09-05 | End: 2022-09-06 | Stop reason: HOSPADM

## 2022-09-05 RX ORDER — OXYCODONE HYDROCHLORIDE 5 MG/1
10 TABLET ORAL EVERY 4 HOURS PRN
Status: CANCELLED | OUTPATIENT
Start: 2022-09-06

## 2022-09-05 RX ORDER — OXYCODONE HYDROCHLORIDE 5 MG/1
10 TABLET ORAL EVERY 4 HOURS PRN
Status: DISCONTINUED | OUTPATIENT
Start: 2022-09-05 | End: 2022-09-06 | Stop reason: HOSPADM

## 2022-09-05 RX ORDER — POTASSIUM CHLORIDE 20 MEQ/1
40 TABLET, EXTENDED RELEASE ORAL PRN
Status: DISCONTINUED | OUTPATIENT
Start: 2022-09-05 | End: 2022-09-06 | Stop reason: HOSPADM

## 2022-09-05 RX ORDER — MAGNESIUM SULFATE IN WATER 40 MG/ML
4000 INJECTION, SOLUTION INTRAVENOUS PRN
Status: CANCELLED | OUTPATIENT
Start: 2022-09-06

## 2022-09-05 RX ORDER — 0.9 % SODIUM CHLORIDE 0.9 %
1000 INTRAVENOUS SOLUTION INTRAVENOUS ONCE
Status: COMPLETED | OUTPATIENT
Start: 2022-09-05 | End: 2022-09-05

## 2022-09-05 RX ORDER — 0.9 % SODIUM CHLORIDE 0.9 %
1000 INTRAVENOUS SOLUTION INTRAVENOUS ONCE
Status: CANCELLED | OUTPATIENT
Start: 2022-09-06 | End: 2022-09-06

## 2022-09-05 RX ORDER — POTASSIUM CHLORIDE 29.8 MG/ML
80 INJECTION INTRAVENOUS PRN
Status: CANCELLED | OUTPATIENT
Start: 2022-09-06

## 2022-09-05 RX ORDER — PROCHLORPERAZINE EDISYLATE 5 MG/ML
10 INJECTION INTRAMUSCULAR; INTRAVENOUS EVERY 6 HOURS PRN
Status: CANCELLED | OUTPATIENT
Start: 2022-09-06

## 2022-09-05 RX ORDER — HEPARIN SODIUM (PORCINE) LOCK FLUSH IV SOLN 100 UNIT/ML 100 UNIT/ML
500 SOLUTION INTRAVENOUS PRN
Status: DISCONTINUED | OUTPATIENT
Start: 2022-09-05 | End: 2022-09-06 | Stop reason: HOSPADM

## 2022-09-05 RX ADMIN — SODIUM CHLORIDE, PRESERVATIVE FREE 500 UNITS: 5 INJECTION INTRAVENOUS at 12:05

## 2022-09-05 RX ADMIN — FILGRASTIM-AAFI 300 MCG: 480 INJECTION, SOLUTION SUBCUTANEOUS at 09:31

## 2022-09-05 RX ADMIN — SODIUM CHLORIDE 1000 ML: 9 INJECTION, SOLUTION INTRAVENOUS at 09:58

## 2022-09-05 NOTE — PROGRESS NOTES
[facility]  Autologous Progress Note    2022    Dayanna Ambrocio    :  1975    MRN:  [id number]    Referring MD: Stefan Keyes, [facility],  [address]      Subjective:  Nausea better, intermittent diarrhea. No confusion or fever. ECOG PS:  (1) Restricted in physically strenuous activity, ambulatory and able to do work of light nature    KPS: 80% Normal activity with effort; some signs or symptoms of disease        Medications       Medication List        ASK your doctor about these medications      acyclovir 800 MG tablet  Commonly known as: Zovirax  Take 1 tablet by mouth 2 times daily     atorvastatin 20 MG tablet  Commonly known as: LIPITOR  Take 1 tablet by mouth every evening TAKE 1 TABLET DAILY     dicyclomine 10 MG capsule  Commonly known as: Bentyl  Take 1 capsule by mouth in the morning and 1 capsule at noon and 1 capsule in the evening and 1 capsule before bedtime. fluconazole 200 MG tablet  Commonly known as: Diflucan  Take 2 tablets by mouth daily     gabapentin 300 MG capsule  Commonly known as: NEURONTIN     levoFLOXacin 500 MG tablet  Commonly known as: LEVAQUIN  Take 1 tablet by mouth daily     lisinopril 20 MG tablet  Commonly known as: PRINIVIL;ZESTRIL  Take 1 tablet by mouth daily     OLANZapine 5 MG tablet  Commonly known as: ZyPREXA  Take 1 tablet by mouth nightly     ondansetron 8 MG tablet  Commonly known as: Zofran  Take 1 tablet by mouth every 8 (eight) hours for 10 days     pantoprazole 40 MG tablet  Commonly known as: PROTONIX  Take 1 tablet by mouth in the morning and 1 tablet in the evening. prochlorperazine 10 MG tablet  Commonly known as: COMPAZINE  Take 1 tablet by mouth in the morning and 1 tablet at noon and 1 tablet in the evening and 1 tablet before bedtime.      Vitamin C 500 MG Caps     VITAMIN D PO             ROS:  As noted above, otherwise remainder of 10-point ROS negative    Physical Exam:     I&O:  No intake or output data in the 24 hours ending 09/05/22 1126      Vital Signs:  BP 91/64   Pulse 83   Temp 98.1 °F (36.7 °C) (Oral)   Resp 16   Wt 136 lb 3.9 oz (61.8 kg)   SpO2 99%   BMI 21.99 kg/m²     Weight:    Wt Readings from Last 3 Encounters:   09/05/22 136 lb 3.9 oz (61.8 kg)   09/04/22 136 lb 14.5 oz (62.1 kg)   09/02/22 139 lb 15.9 oz (63.5 kg)       General: Awake, alert and oriented. HEENT: normocephalic, alopecia, PERRL, no scleral erythema or icterus, Oral mucosa moist and intact, throat clear  NECK: supple without palpable adenopathy  BACK: Straight negative CVAT  SKIN: warm dry and intact without lesions rashes or masses  CHEST: CTA bilaterally without use of accessory muscles  CV: Normal S1 S2, RRR, no MRG  ABD: NT ND normoactive BS, no palpable masses or hepatosplenomegaly  EXTREMITIES: without edema, denies calf tenderness  NEURO: CN II - XII grossly intact  CATHETER: Right Subclavian Trifusion (IR: Dianne, 7/22/22): CDI      Data:   CBC:   Recent Labs     09/03/22  0834 09/04/22  0837 09/05/22  0850   WBC 2.4* 1.7* 1.2*   HGB 13.6 14.2 13.1*   HCT 40.6 41.4 37.7*   MCV 93.9 93.3 92.3   PLT 79* 64* 38*       BMP/Mag:  Recent Labs     09/03/22  0834 09/04/22  0837 09/05/22  0850   * 136 136   K 4.3 4.3 4.4   CL 99 99 98*   CO2 31 30 28   PHOS 4.0 4.8 4.4   BUN 12 12 14   CREATININE 0.8* 0.7* 0.8*   MG  --   --  1.70*       LIVP:   Recent Labs     09/05/22  0850   AST 16   ALT 11   BILIDIR <0.2   BILITOT 0.7   ALKPHOS 84       Uric Acid:    Recent Labs     09/05/22  0850   LABURIC 2.1*       Coags:   Recent Labs     09/05/22  0850   PROTIME 14.3   INR 1.12       PROBLEM LIST:            Multiple Myeloma  HTN  HLD  Positive H. pylori breath test  Emend hypersensitivity rxn (8/2022)  CINV      TREATMENT:            RVd on (4/11/22) x 4 cycles   [id number] & ASCT (8/30/22) w/ 4.84 x 10^6 CD34+ cells/kg     ASSESSMENT AND PLAN:            1.  IgG lambda myeloma, ISS stage II: VGPR  - Bone marrow biopsy (3/10/2022): showed 70% cellularity with plasma cells estimated to account for between 30-50%. 46,XY[20]. Myeloma ROBIN panel showed three copies of CCND1 (11q13. 3) in 83% of cells, four copies of CCND1 (11q13. 3) in 5% of cells, three copies of MAF (16q23.2) in 54% of cells, and monosomy 13 in 9% of cells. - PET/CT (3/9/22) - showed no evidence of hypermetabolism. - Ssy238 & ASCT (8/30/22) w/ 4.84 x 10^6 CD34+ cells/kg     Emend Hypersensitivity 8/29/22 including facial flushing, stomach cramping. D/C'ed and started PO Zyprexa 10 mg once 8/29/22 then 5 mg PO daily for 3 more days, extended an additional 3 days. Day + 6     2. ID:  Afebrile, no evidence of infection.    - Cont Acyclovir & Diflucan ppx  - Start Levaquin (9/4/22)hs been taking since 8/30/22  Will brin in meds 9/5 for review. 3.  Heme: Anemia & thrombocytopenia d/t recent chemotherapy    - Transfuse for Hgb < 7 and Platelets < 87X  - No transfusion today  - Start daily G-CSF Day + 5 (9/4/22)     4. Metabolic: Mild Hyperglycemia & hypoNa w/ stable renal fxn stable. - Cont IVF hydration: 1 L NS each day in OP Infusion  - Replace potassium and magnesium per policy. 5. Nutrition / GI:    - Hx positive H. pylori breath   - EGD & Colonoscopy Bx (8/18/22) - negative   Nutrition:  Appetite and oral intake is okay  - Cont low microbial diet   - Follow with dietary  GI: Persistent nausea   - Cont PPI BID   - Cont Zofran q8hrs (scheduled 8/31/22) and Compazine q6hrs (prn). If nausea continues to improve will start to back off on the Zofran 9/6/22  - Cont Zyprexa 5 mg nightly; consider increasing to 10 mg nightly if nausea persists. - may need Ativan for nausea as anxiety may be contributing      6. Pulmonary: No active issues   - DLCO of 75% of predicted finding on ASCT workup: No obvious etiology for this  - CXR (8/29/22): Negative for acute disease     7.  Prevention of SRE:    - PET/CT (3/9/22) - without bony disease.    - Hold off on a bisphosphonate. 8. Peripheral Neuropathy:   - Cont gabapentin 300 mg nighty (Consider increasing dose if symptoms persist)     9. Cardiac:    - H/o HTN  HTN:  stable   - S/p Lisinopril 20 mg daily (stopped 9/3/22)       - Disposition: Return to OP Infusion daily for toxicity assessment, labs and MD visit.       The patient was seen and examined by Dr. Moni Gonzales MD  [facility]  Please contact me through [facility]

## 2022-09-05 NOTE — PROGRESS NOTES
Short Stay Communication Note  Sarita Francois  Diagnosis: Multiple Myeloma  Primary MD: Dr. Sin Avitia  Treatment: Melphalan   Day +6 of Auto Transplant   Pt seen in outpatient infusion today. Labs drawn and reviewed. CBC:   Recent Labs     09/03/22  0834 09/04/22  0837 09/05/22  0850   WBC 2.4* 1.7* 1.2*   HGB 13.6 14.2 13.1*   HCT 40.6 41.4 37.7*   MCV 93.9 93.3 92.3   PLT 79* 64* 38*       BMP/Mag:  Recent Labs     09/03/22  0834 09/04/22  0837 09/05/22  0850   * 136 136   K 4.3 4.3 4.4   CL 99 99 98*   CO2 31 30 28   PHOS 4.0 4.8 4.4   BUN 12 12 14   CREATININE 0.8* 0.7* 0.8*   MG  --   --  1.70*       Standing parameters for replacement for this patient:   1 unit of pack red blood cells for a hemoglobin < or equal to 7.0  1 pack of platelets for a platelet count less than or equal to 10.0  40 MeQ of Potassium administered for a potassium level less than or equal to 3.4  4g of Magnesium Sulfate for a magnesum level less than or equal to 1.4  No transfusions required for the above lab values. Urinalysis last done: 9/5/2022 Urinalysis next due: 9/12/2022    Chest X-Ray last done: 9/5/2022 Chest X-Ray next due: 9/12/2022    Symptoms addressed and reported to care team this date: nausea, loss of appetite  Treatments this date: IV Fluids,Labs, Granix    Reviewed medication schedule with pt and caregiver. Both able to verbalize all medications and schedule. Pt to be seen again tomorrow. Patient and caregiver verbalized understanding of discharge instructions including when and how to call the doctor and when to report  to the ER. Discharged ambulatory to home. CVC line care completed, flushed, heparin locked, dressing and caps changed.     Electronically signed by Daniella De Souza RN on 9/5/2022 at 10:00 AM'

## 2022-09-06 ENCOUNTER — HOSPITAL ENCOUNTER (OUTPATIENT)
Dept: ONCOLOGY | Age: 47
Setting detail: INFUSION SERIES
Discharge: HOME | End: 2022-09-06
Payer: COMMERCIAL

## 2022-09-06 ENCOUNTER — HOSPITAL ENCOUNTER (OUTPATIENT)
Dept: ONCOLOGY | Age: 47
Setting detail: INFUSION SERIES
Discharge: HOME | End: 2022-09-06

## 2022-09-06 VITALS
RESPIRATION RATE: 16 BRPM | SYSTOLIC BLOOD PRESSURE: 98 MMHG | HEART RATE: 82 BPM | OXYGEN SATURATION: 99 % | WEIGHT: 134.26 LBS | BODY MASS INDEX: 23.07 KG/M2 | HEART RATE: 89 BPM | HEIGHT: 66 IN | DIASTOLIC BLOOD PRESSURE: 71 MMHG | HEIGHT: 66 IN | OXYGEN SATURATION: 99 % | SYSTOLIC BLOOD PRESSURE: 105 MMHG | TEMPERATURE: 98.4 F | WEIGHT: 143.52 LBS | BODY MASS INDEX: 21.58 KG/M2 | RESPIRATION RATE: 16 BRPM | TEMPERATURE: 98 F | DIASTOLIC BLOOD PRESSURE: 63 MMHG

## 2022-09-06 DIAGNOSIS — C90.00 MULTIPLE MYELOMA NOT HAVING ACHIEVED REMISSION (HCC): Primary | ICD-10-CM

## 2022-09-06 LAB
ANION GAP SERPL CALCULATED.3IONS-SCNC: 7 MMOL/L (ref 3–16)
BUN BLDV-MCNC: 10 MG/DL (ref 7–20)
CALCIUM SERPL-MCNC: 9.1 MG/DL (ref 8.3–10.6)
CHLORIDE BLD-SCNC: 102 MMOL/L (ref 99–110)
CO2: 29 MMOL/L (ref 21–32)
CREAT SERPL-MCNC: 0.8 MG/DL (ref 0.9–1.3)
GFR AFRICAN AMERICAN: >60
GFR NON-AFRICAN AMERICAN: >60
GLUCOSE BLD-MCNC: 174 MG/DL (ref 70–99)
HCT VFR BLD CALC: 36.8 % (ref 40.5–52.5)
HEMOGLOBIN: 12.5 G/DL (ref 13.5–17.5)
MCH RBC QN AUTO: 31.6 PG (ref 26–34)
MCHC RBC AUTO-ENTMCNC: 34 G/DL (ref 31–36)
MCV RBC AUTO: 93 FL (ref 80–100)
PDW BLD-RTO: 12.4 % (ref 12.4–15.4)
PHOSPHORUS: 3.9 MG/DL (ref 2.5–4.9)
PLATELET # BLD: 26 K/UL (ref 135–450)
PMV BLD AUTO: 8.5 FL (ref 5–10.5)
POTASSIUM SERPL-SCNC: 4.1 MMOL/L (ref 3.5–5.1)
RBC # BLD: 3.96 M/UL (ref 4.2–5.9)
SODIUM BLD-SCNC: 138 MMOL/L (ref 136–145)
WBC # BLD: 0.1 K/UL (ref 4–11)

## 2022-09-06 PROCEDURE — 96372 THER/PROPH/DIAG INJ SC/IM: CPT

## 2022-09-06 PROCEDURE — 2580000003 HC RX 258: Performed by: INTERNAL MEDICINE

## 2022-09-06 PROCEDURE — 85025 COMPLETE CBC W/AUTO DIFF WBC: CPT

## 2022-09-06 PROCEDURE — 96361 HYDRATE IV INFUSION ADD-ON: CPT

## 2022-09-06 PROCEDURE — 84100 ASSAY OF PHOSPHORUS: CPT

## 2022-09-06 PROCEDURE — 80048 BASIC METABOLIC PNL TOTAL CA: CPT

## 2022-09-06 PROCEDURE — 96360 HYDRATION IV INFUSION INIT: CPT

## 2022-09-06 PROCEDURE — 6360000002 HC RX W HCPCS: Performed by: INTERNAL MEDICINE

## 2022-09-06 PROCEDURE — 36592 COLLECT BLOOD FROM PICC: CPT

## 2022-09-06 PROCEDURE — 6360000002 HC RX W HCPCS: Performed by: NURSE PRACTITIONER

## 2022-09-06 RX ORDER — SODIUM CHLORIDE 9 MG/ML
INJECTION, SOLUTION INTRAVENOUS CONTINUOUS PRN
Status: DISCONTINUED | OUTPATIENT
Start: 2022-09-06 | End: 2022-09-07 | Stop reason: HOSPADM

## 2022-09-06 RX ORDER — 0.9 % SODIUM CHLORIDE 0.9 %
1000 INTRAVENOUS SOLUTION INTRAVENOUS ONCE
Status: CANCELLED | OUTPATIENT
Start: 2022-09-07 | End: 2022-09-07

## 2022-09-06 RX ORDER — OXYCODONE HYDROCHLORIDE 5 MG/1
5 TABLET ORAL EVERY 4 HOURS PRN
Status: CANCELLED | OUTPATIENT
Start: 2022-09-07

## 2022-09-06 RX ORDER — SODIUM CHLORIDE 9 MG/ML
INJECTION, SOLUTION INTRAVENOUS CONTINUOUS PRN
Status: CANCELLED | OUTPATIENT
Start: 2022-09-07

## 2022-09-06 RX ORDER — FLUCONAZOLE 2 MG/ML
200 INJECTION, SOLUTION INTRAVENOUS
Status: DISCONTINUED | OUTPATIENT
Start: 2022-09-06 | End: 2022-09-06 | Stop reason: HOSPADM

## 2022-09-06 RX ORDER — HEPARIN SODIUM (PORCINE) LOCK FLUSH IV SOLN 100 UNIT/ML 100 UNIT/ML
500 SOLUTION INTRAVENOUS PRN
Status: CANCELLED | OUTPATIENT
Start: 2022-09-07

## 2022-09-06 RX ORDER — OXYCODONE HYDROCHLORIDE 5 MG/1
10 TABLET ORAL EVERY 4 HOURS PRN
Status: DISCONTINUED | OUTPATIENT
Start: 2022-09-06 | End: 2022-09-07 | Stop reason: HOSPADM

## 2022-09-06 RX ORDER — ONDANSETRON 2 MG/ML
8 INJECTION INTRAMUSCULAR; INTRAVENOUS EVERY 8 HOURS PRN
Status: CANCELLED | OUTPATIENT
Start: 2022-09-07

## 2022-09-06 RX ORDER — FLUCONAZOLE 2 MG/ML
200 INJECTION, SOLUTION INTRAVENOUS
Status: DISCONTINUED | OUTPATIENT
Start: 2022-09-06 | End: 2022-09-06 | Stop reason: SDUPTHER

## 2022-09-06 RX ORDER — DIMETHICONE, CAMPHOR (SYNTHETIC), MENTHOL, AND PHENOL 1.1; .5; .625; .5 G/100G; G/100G; G/100G; G/100G
OINTMENT TOPICAL PRN
Status: DISCONTINUED | OUTPATIENT
Start: 2022-09-06 | End: 2022-09-07 | Stop reason: HOSPADM

## 2022-09-06 RX ORDER — PROCHLORPERAZINE EDISYLATE 5 MG/ML
10 INJECTION INTRAMUSCULAR; INTRAVENOUS EVERY 6 HOURS PRN
Status: CANCELLED | OUTPATIENT
Start: 2022-09-07

## 2022-09-06 RX ORDER — ONDANSETRON 2 MG/ML
8 INJECTION INTRAMUSCULAR; INTRAVENOUS EVERY 8 HOURS PRN
Status: DISCONTINUED | OUTPATIENT
Start: 2022-09-06 | End: 2022-09-07 | Stop reason: HOSPADM

## 2022-09-06 RX ORDER — PROCHLORPERAZINE MALEATE 10 MG
10 TABLET ORAL EVERY 6 HOURS PRN
Status: CANCELLED | OUTPATIENT
Start: 2022-09-07

## 2022-09-06 RX ORDER — PROCHLORPERAZINE EDISYLATE 5 MG/ML
10 INJECTION INTRAMUSCULAR; INTRAVENOUS EVERY 6 HOURS PRN
Status: DISCONTINUED | OUTPATIENT
Start: 2022-09-06 | End: 2022-09-07 | Stop reason: HOSPADM

## 2022-09-06 RX ORDER — MAGNESIUM SULFATE IN WATER 40 MG/ML
4000 INJECTION, SOLUTION INTRAVENOUS PRN
Status: CANCELLED | OUTPATIENT
Start: 2022-09-07

## 2022-09-06 RX ORDER — OXYCODONE HYDROCHLORIDE 5 MG/1
5 TABLET ORAL EVERY 4 HOURS PRN
Status: DISCONTINUED | OUTPATIENT
Start: 2022-09-06 | End: 2022-09-07 | Stop reason: HOSPADM

## 2022-09-06 RX ORDER — 0.9 % SODIUM CHLORIDE 0.9 %
1000 INTRAVENOUS SOLUTION INTRAVENOUS ONCE
Status: COMPLETED | OUTPATIENT
Start: 2022-09-06 | End: 2022-09-06

## 2022-09-06 RX ORDER — POTASSIUM CHLORIDE 29.8 MG/ML
80 INJECTION INTRAVENOUS PRN
Status: CANCELLED | OUTPATIENT
Start: 2022-09-07

## 2022-09-06 RX ORDER — POTASSIUM CHLORIDE 29.8 MG/ML
80 INJECTION INTRAVENOUS PRN
Status: DISCONTINUED | OUTPATIENT
Start: 2022-09-06 | End: 2022-09-07 | Stop reason: HOSPADM

## 2022-09-06 RX ORDER — MAGNESIUM SULFATE IN WATER 40 MG/ML
4000 INJECTION, SOLUTION INTRAVENOUS PRN
Status: DISCONTINUED | OUTPATIENT
Start: 2022-09-06 | End: 2022-09-07 | Stop reason: HOSPADM

## 2022-09-06 RX ORDER — ONDANSETRON 4 MG/1
8 TABLET, FILM COATED ORAL EVERY 8 HOURS PRN
Status: CANCELLED | OUTPATIENT
Start: 2022-09-07

## 2022-09-06 RX ORDER — HEPARIN SODIUM (PORCINE) LOCK FLUSH IV SOLN 100 UNIT/ML 100 UNIT/ML
500 SOLUTION INTRAVENOUS PRN
Status: DISCONTINUED | OUTPATIENT
Start: 2022-09-06 | End: 2022-09-07 | Stop reason: HOSPADM

## 2022-09-06 RX ORDER — PROCHLORPERAZINE MALEATE 10 MG
10 TABLET ORAL EVERY 6 HOURS PRN
Status: DISCONTINUED | OUTPATIENT
Start: 2022-09-06 | End: 2022-09-07 | Stop reason: HOSPADM

## 2022-09-06 RX ORDER — ONDANSETRON 4 MG/1
8 TABLET, FILM COATED ORAL EVERY 8 HOURS PRN
Status: DISCONTINUED | OUTPATIENT
Start: 2022-09-06 | End: 2022-09-07 | Stop reason: HOSPADM

## 2022-09-06 RX ORDER — POTASSIUM CHLORIDE 20 MEQ/1
40 TABLET, EXTENDED RELEASE ORAL PRN
Status: CANCELLED | OUTPATIENT
Start: 2022-09-07

## 2022-09-06 RX ORDER — OXYCODONE HYDROCHLORIDE 5 MG/1
10 TABLET ORAL EVERY 4 HOURS PRN
Status: CANCELLED | OUTPATIENT
Start: 2022-09-07

## 2022-09-06 RX ORDER — PETROLATUM, MENTHOL, UNSPECIFIED FORM, CAMPHOR (SYNTHETIC), AND PHENOL 59.14; 1; 1; .6 G/100G; G/100G; G/100G; G/100G
PASTE TOPICAL PRN
Status: CANCELLED | OUTPATIENT
Start: 2022-09-07

## 2022-09-06 RX ORDER — POTASSIUM CHLORIDE 20 MEQ/1
40 TABLET, EXTENDED RELEASE ORAL PRN
Status: DISCONTINUED | OUTPATIENT
Start: 2022-09-06 | End: 2022-09-07 | Stop reason: HOSPADM

## 2022-09-06 RX ADMIN — SODIUM CHLORIDE, PRESERVATIVE FREE 500 UNITS: 5 INJECTION INTRAVENOUS at 11:59

## 2022-09-06 RX ADMIN — FILGRASTIM-AAFI 300 MCG: 480 INJECTION, SOLUTION SUBCUTANEOUS at 09:55

## 2022-09-06 RX ADMIN — SODIUM CHLORIDE 1000 ML: 9 INJECTION, SOLUTION INTRAVENOUS at 08:53

## 2022-09-06 ASSESSMENT — PAIN SCALES - GENERAL: PAINLEVEL_OUTOF10: 0

## 2022-09-06 NOTE — PROGRESS NOTES
Short Stay Communication Note  Sana Bush  Diagnosis: Multiple Myeloma  Primary MD: Dr. Davis Rutledge   Treatment: Melphalan Fludarabine/Cytoxan  Day +7 of Auto Transplant   Pt seen in outpatient infusion today. Labs drawn and reviewed. CBC:   Recent Labs     09/04/22  0837 09/05/22  0850 09/06/22  0859   WBC 1.7* 1.2* 0.1*   HGB 14.2 13.1* 12.5*   HCT 41.4 37.7* 36.8*   MCV 93.3 92.3 93.0   PLT 64* 38* 26*     BMP/Mag:  Recent Labs     09/04/22  0837 09/05/22  0850 09/06/22  0859    136 138   K 4.3 4.4 4.1   CL 99 98* 102   CO2 30 28 29   PHOS 4.8 4.4 3.9   BUN 12 14 10   CREATININE 0.7* 0.8* 0.8*   MG  --  1.70*  --      Standing parameters for replacement for this patient:   1 unit of pack red blood cells for a hemoglobin < or equal to 7.0   1 pack of platelets for a platelet count less than or equal to 10.0  40 MeQ of Potassium administered for a potassium level less than or equal to 3.4  4g of Magnesium Sulfate for a magnesum level less than or equal to 1.4  No transfusions required for the above lab values. Urinalysis last done: 9/5/2022 Urinalysis next due: 9/12/2022    Chest X-Ray last done: 9/5/2022 Chest X-Ray next due: 9/12/2022    Symptoms addressed and reported to care team this date:   Treatments this date: IV Fluids    Reviewed medication schedule with pt and caregiver. Both able to verbalize all medications and schedule. Pt to be seen again tomorrow. Patient and caregiver verbalized understanding of discharge instructions including when and how to call the doctor and when to report  to the ER. Discharged ambulatory to home.   Electronically signed by Mei Song RN on 9/6/2022 at 12:15 PM

## 2022-09-06 NOTE — PROGRESS NOTES
[facility]  Autologous Progress Note    2022    Tammy Vu    :  1975    MRN:  [id number]    Referring MD: Maia Charles, [facility],  [address]      Subjective:  Mild nausea and occ diarrhea. ECOG PS:  (1) Restricted in physically strenuous activity, ambulatory and able to do work of light nature    KPS: 80% Normal activity with effort; some signs or symptoms of disease        Medications       Medication List        ASK your doctor about these medications      acyclovir 800 MG tablet  Commonly known as: Zovirax  Take 1 tablet by mouth 2 times daily     atorvastatin 20 MG tablet  Commonly known as: LIPITOR  Take 1 tablet by mouth every evening TAKE 1 TABLET DAILY     dicyclomine 10 MG capsule  Commonly known as: Bentyl  Take 1 capsule by mouth in the morning and 1 capsule at noon and 1 capsule in the evening and 1 capsule before bedtime. fluconazole 200 MG tablet  Commonly known as: Diflucan  Take 2 tablets by mouth daily     gabapentin 300 MG capsule  Commonly known as: NEURONTIN     levoFLOXacin 500 MG tablet  Commonly known as: LEVAQUIN  Take 1 tablet by mouth daily     lisinopril 20 MG tablet  Commonly known as: PRINIVIL;ZESTRIL  Take 1 tablet by mouth daily     OLANZapine 5 MG tablet  Commonly known as: ZyPREXA  Take 1 tablet by mouth nightly     ondansetron 8 MG tablet  Commonly known as: Zofran  Take 1 tablet by mouth every 8 (eight) hours for 10 days     pantoprazole 40 MG tablet  Commonly known as: PROTONIX  Take 1 tablet by mouth in the morning and 1 tablet in the evening. prochlorperazine 10 MG tablet  Commonly known as: COMPAZINE  Take 1 tablet by mouth in the morning and 1 tablet at noon and 1 tablet in the evening and 1 tablet before bedtime.      Vitamin C 500 MG Caps     VITAMIN D PO             ROS:  As noted above, otherwise remainder of 10-point ROS negative    Physical Exam:     I&O:  No intake or output data in the 24 hours ending 09/06/22 1007      Vital Signs:  BP 98/63   Pulse 82   Temp 98.4 °F (36.9 °C) (Oral)   Resp 16   Ht 5' 6\" (1.676 m)   Wt 134 lb 4.2 oz (60.9 kg)   SpO2 99%   BMI 21.67 kg/m²     Weight:    Wt Readings from Last 3 Encounters:   09/06/22 134 lb 4.2 oz (60.9 kg)   09/05/22 136 lb 3.9 oz (61.8 kg)   09/04/22 136 lb 14.5 oz (62.1 kg)       General: Awake, alert and oriented. HEENT: normocephalic, alopecia, PERRL, no scleral erythema or icterus, Oral mucosa moist and intact, throat clear  NECK: supple without palpable adenopathy  BACK: Straight negative CVAT  SKIN: warm dry and intact without lesions rashes or masses  CHEST: CTA bilaterally without use of accessory muscles  CV: Normal S1 S2, RRR, no MRG  ABD: NT ND normoactive BS, no palpable masses or hepatosplenomegaly  EXTREMITIES: without edema, denies calf tenderness  NEURO: CN II - XII grossly intact  CATHETER: Right Subclavian Trifusion (IR: Dianne, 7/22/22): CDI      Data:   CBC:   Recent Labs     09/04/22  0837 09/05/22  0850 09/06/22  0859   WBC 1.7* 1.2* 0.1*   HGB 14.2 13.1* 12.5*   HCT 41.4 37.7* 36.8*   MCV 93.3 92.3 93.0   PLT 64* 38* 26*     BMP/Mag:  Recent Labs     09/04/22  0837 09/05/22  0850 09/06/22  0859    136 138   K 4.3 4.4 4.1   CL 99 98* 102   CO2 30 28 29   PHOS 4.8 4.4 3.9   BUN 12 14 10   CREATININE 0.7* 0.8* 0.8*   MG  --  1.70*  --      LIVP:   Recent Labs     09/05/22  0850   AST 16   ALT 11   BILIDIR <0.2   BILITOT 0.7   ALKPHOS 84     Uric Acid:    Recent Labs     09/05/22  0850   LABURIC 2.1*     Coags:   Recent Labs     09/05/22  0850   PROTIME 14.3   INR 1.12       PROBLEM LIST:            Multiple Myeloma  HTN  HLD  Positive H. pylori breath test  Emend hypersensitivity rxn (8/2022)  CINV      TREATMENT:            RVd on (4/11/22) x 4 cycles   Ptu550 & ASCT (8/30/22) w/ 4.84 x 10^6 CD34+ cells/kg     ASSESSMENT AND PLAN:            1.  IgG lambda myeloma, ISS stage II: VGPR  - Bone marrow biopsy (3/10/2022): showed 70% cellularity with plasma cells estimated to account for between 30-50%. 46,XY[20]. Myeloma ROBIN panel showed three copies of CCND1 (11q13. 3) in 83% of cells, four copies of CCND1 (11q13. 3) in 5% of cells, three copies of MAF (16q23.2) in 54% of cells, and monosomy 13 in 9% of cells. - PET/CT (3/9/22) - showed no evidence of hypermetabolism. - Dtq818 & ASCT (8/30/22) w/ 4.84 x 10^6 CD34+ cells/kg     Emend Hypersensitivity 8/29/22 including facial flushing, stomach cramping. D/C'ed and started PO Zyprexa 10 mg once 8/29/22 then 5 mg PO daily for 3 more days, extended an additional 3 days. Day + 7     2. ID:  Afebrile, no evidence of infection.    - Cont Acyclovir & Diflucan ppx  - Start Levaquin (9/4/22)hs been taking since 8/30/22  Will brin in meds 9/5 for review. 3.  Heme: Anemia & thrombocytopenia d/t recent chemotherapy    - Transfuse for Hgb < 7 and Platelets < 61M  - No transfusion today  - Start daily G-CSF Day + 5 (9/4/22)     4. Metabolic: Mild Hyperglycemia & hypoNa w/ stable renal fxn stable. - Cont IVF hydration: 1 L NS each day in OP Infusion  - Replace potassium and magnesium per policy. 5. Nutrition / GI:    - Hx positive H. pylori breath   - EGD & Colonoscopy Bx (8/18/22) - negative   Nutrition:  Appetite and oral intake is okay  - Cont low microbial diet   - Follow with dietary  GI: Persistent nausea   - Cont PPI BID   - Cont Zofran q8hrs (scheduled 8/31/22) and Compazine q6hrs (prn). If nausea continues to improve will start to back off on the Zofran 9/6/22  - Cont Zyprexa 5 mg nightly; consider increasing to 10 mg nightly if nausea persists. - may need Ativan for nausea as anxiety may be contributing      6. Pulmonary: No active issues   - DLCO of 75% of predicted finding on ASCT workup: No obvious etiology for this  - CXR (8/29/22): Negative for acute disease     7.  Prevention of SRE:    - PET/CT (3/9/22) - without bony disease.    - Hold off on a bisphosphonate. 8. Peripheral Neuropathy:   - Cont gabapentin 300 mg nighty (Consider increasing dose if symptoms persist)     9. Cardiac:    - H/o HTN  HTN:  stable   - S/p Lisinopril 20 mg daily (stopped 9/3/22)       - Disposition: Return to OP Infusion daily for toxicity assessment, labs and MD visit. The patient was seen and examined by Dr. Pilar Hernandez. Preethi Mathur.  Pilar Hernandez, [address] [place] [address]

## 2022-09-06 NOTE — PROGRESS NOTES
Pt arrived day+7 auto transplant. Pt reports nausea continuing, no emesis since 9/4/2022. Reports diarrhea x5 since 9/5/2022 AM. LS CTA, bowel sounds hypoactive. Pt reports a burning feeling postprandial resulting in loss of appetite. No noted edema at this time. Pt reports increased urine. Pt up in chair, call light in reach, nonskid footwear on pt, spouse chairside, hourly rounding in place.  Electronically signed by Robe Ansari RN on 9/6/2022 at 9:25 AM'

## 2022-09-06 NOTE — PROGRESS NOTES
Oncology Nutrition Assessment       RECOMMENDATIONS:  PO Diet: Low Microbial/General diet. Small/frequent attempts at eating. ONS: Suggest pt increase ONS to TID of any combination of pt choosing. Provided pt w/samples of ensure clear and bene-calorie. Antiemetics- suggest at least 30 min prior to meal times; may use per MD. RD suggested \"sea-bands\" acupressure wristband to aid w/nausea control. Antidiarrheal- has imodium PRN per MD. RD offered samples of banatrol to utilize with foods. Nutrition Education: 8/26/22 BMT food safety and booklet reviewed w/pt. 9/1 Offered nausea mgt; pt declined materials (pt is RD w/previous knowledge). NUTRITION ASSESSMENT:   Nutritional summary & status: D+7; increased nausea and diarrhea; RD noted impacting PO nutrition w/PO recorded < 75%; weight loss also noted, however this is marginally lower than his UBW. Still, pt now with risk of malnutrition. Nutrition related side-effect management: Noted antiemetics; pt aware of usage. Pt attempting to eat, states he has hunger, but nausea biggest barrier. Diarrhea per pt seems managed. Fatigue also impacting desire to eat. Using antiemetics as instructed; has imodium PRN. Food Choices & Nutrition Goals: Hx: Pt follows pretty regimented eating pattern w/specific foods, thus \"eating whatever\" is opposite of patient's normal practices. Openly discussed methods to encourage/increase eating; foods he reports he is craving vs his \"internal dietitian\", which at this point is overly restrictive to Solomon's nutrition needs. RD encouraged him that eating what he could/when he could, with some consideration to GI tolerance, along with adequate hydration, are the only asks nutritionally at this time. Encouraging pt to utilize calories from liquids/ONS until able to eat more solid foods.    Admission/PMH: D+7; increased nausea and diarrhea; RD noted impacting PO nutrition w/PO recorded < 75%; weight loss also noted, however this is marginally lower than his UBW. Still, pt now with risk of malnutrition. Noted antiemetics; pt aware of usage. MALNUTRITION ASSESSMENT  Context of Malnutrition: Acute Illness (on chronic)   Malnutrition Status: At risk for malnutrition (Comment)    NUTRITION DIAGNOSIS   Inadequate oral intake related to altered GI function as evidenced by nausea, weight loss, intake 26-50%    NUTRITION INTERVENTION  Food and/or Nutrient Delivery:  Modify Oral Nutrition Supplement, Snacks (Comment), Continue Current Diet  Nutrition Education/Counseling:  Counseling completed   Goals:  Pt will improve PO intake by increasing ONS to TID and consuming at least 50% or more of meals/snacks throughout the day until s/s resolve. Nutrition Monitoring and Evaluation:   Food/Nutrient Intake Outcomes:  Food and Nutrient Intake, Supplement Intake  Physical Signs/Symptoms Outcomes:  Weight, Meal Time Behavior, GI Status, Nausea or Vomiting, Diarrhea     OBJECTIVE DATA: Significant to nutrition assessment  Nutrition-Focused Physical Findings: ++nausea; 1 emesis saturday; diarrhea mild; no mouth sores per pt  Labs: Reviewed; WBC- 5.1 ANC- 4.9 (9/1/22)  Meds: Reviewed; zyprexa; compazine, zofran prn; protonics; Wounds: None       CURRENT NUTRITION THERAPIES  PO Diet: Low Micbrobial/General diet   ONS: Using Muscle Milk QD since bmt.    PO Intake: 26-50%   PO Supplement Intake:51-75%  Additional Sources of Calories/IVF:1L NS during infusion     ANTHROPOMETRICS  Current Height: 5' 6\" (167.6 cm)  Current Weight: 134 lb 4.2 oz (60.9 kg)    Admission weight: 134 lb 4.2 oz (60.9 kg)  Ideal Body Weight (IBW): 142 lbs  (65 kg)    Usual Bodyweight 135 lb (61.2 kg)   Weight Changes weight gain r/t BMT infusion;       BMI: 0    Wt Readings from Last 50 Encounters:   09/06/22 134 lb 4.2 oz (60.9 kg)   09/05/22 136 lb 3.9 oz (61.8 kg)   09/04/22 136 lb 14.5 oz (62.1 kg)   09/02/22 139 lb 15.9 oz (63.5 kg)   09/01/22 143 lb 8.3 oz (65.1 kg)   08/31/22 147 lb 14.9 oz (67.1 kg)   08/30/22 144 lb 6.4 oz (65.5 kg)   08/29/22 138 lb 0.1 oz (62.6 kg)   08/15/22 140 lb (63.5 kg)   08/11/22 140 lb 4.8 oz (63.6 kg)   08/09/22 135 lb (61.2 kg)   08/01/22 140 lb (63.5 kg)   07/22/22 136 lb (61.7 kg)   07/06/22 145 lb 7 oz (66 kg)   02/14/22 141 lb (64 kg)   07/08/21 140 lb (63.5 kg)   06/23/21 140 lb 12.8 oz (63.9 kg)   06/21/21 142 lb (64.4 kg)   06/10/21 142 lb (64.4 kg)   06/04/21 142 lb (64.4 kg)   01/06/21 134 lb (60.8 kg)   07/06/20 155 lb (70.3 kg)   12/03/19 155 lb 6.4 oz (70.5 kg)       COMPARATIVE STANDARDS  Energy (kcal):  4370-3213 (25-30)     Protein (g):   (1.5-1.8)       Fluid (ml/day):  3032-8973    Consult dietitian if nutrition interventions essential to patient care are needed.      Alida Stephens, RD, LD

## 2022-09-06 NOTE — PROGRESS NOTES
Oncology Nutrition Assessment       RECOMMENDATIONS:  PO Diet: Low Microbial/General diet. Encourage small/frequent meals with dietary protein as tolerated. ONS: Pt using Muscle Milk QD; may us this or any ONS once to twice daily to meet increased nutrient needs. Antiemetics- suggest at least 30 min prior to meal times; may use per MD.   Nutrition Education: 8/26/22 BMT food safety and booklet reviewed w/pt. 9/1 Offered nausea mgt; pt declined materials (pt is RD w/previous knowledge). NUTRITION ASSESSMENT:   Nutritional summary & status: Pt is D+2 from Auto BMT; reports nausea and mild modifications to food choices at meals/snacks. Incorporating muscle milk. Provided w/zyprexa and antiemetics, which pt reports are helping. Drinking adequately; weight up from transplant infusion. WBC/ANC WNL still; discussed potential for nutrition to shift again once counts drop. Encouraged pt to incorporate Muscle Milk daily or any ONS as desired; suggested trial of Ensure Clear PRN. PG-SGA: Score 2-3; Patient and family education by dietitians, RN, or other clinician, with pharmacological intervention as indicated Pt reports nausea, diarrhea, altered taste acuity, and feeling not my normal self, but able to be up and about with fairly normal activities. Admission/PMH: Melphalan-200 preparative regimen followed by Autologous Stem Cell Transplant 8/30/22 for IgG Lambda Multiple Myeloma; dx March 2022; started RVd on 4/11/2022. He has completed 4 cycles of treatment.  original transplant date of 8/4/22 was unfortunately put on hold due to GI issues including crampy epigastric/periumbilical abdominal pain which was likely acid reflux and he had a positive H. pylori PMH significant for HTN, HLD     MALNUTRITION ASSESSMENT  Context of Malnutrition: Chronic Illness   Malnutrition Status: No malnutrition    NUTRITION DIAGNOSIS   Increased nutrient needs related to increase demand for energy/nutrients as evidenced by  (chemotx (64.4 kg)   01/06/21 134 lb (60.8 kg)   07/06/20 155 lb (70.3 kg)   12/03/19 155 lb 6.4 oz (70.5 kg)       COMPARATIVE STANDARDS  Energy (kcal):  9560-9140 (25-30)     Protein (g):   (1.5-1.8)       Fluid (ml/day):  9988-3221    Consult dietitian if nutrition interventions essential to patient care are needed.      Lillie Lira, RAHEL, LD

## 2022-09-07 ENCOUNTER — HOSPITAL ENCOUNTER (OUTPATIENT)
Age: 47
Discharge: HOME | End: 2022-09-07
Attending: INTERNAL MEDICINE | Admitting: FAMILY MEDICINE
Payer: COMMERCIAL

## 2022-09-07 ENCOUNTER — HOSPITAL ENCOUNTER (OUTPATIENT)
Dept: ONCOLOGY | Age: 47
Setting detail: INFUSION SERIES
Discharge: HOME | End: 2022-09-07
Payer: COMMERCIAL

## 2022-09-07 ENCOUNTER — HOSPITAL ENCOUNTER (OUTPATIENT)
Dept: ONCOLOGY | Age: 47
Setting detail: INFUSION SERIES
End: 2022-09-07
Payer: COMMERCIAL

## 2022-09-07 VITALS
OXYGEN SATURATION: 100 % | TEMPERATURE: 98.1 F | SYSTOLIC BLOOD PRESSURE: 97 MMHG | WEIGHT: 135.8 LBS | HEART RATE: 84 BPM | BODY MASS INDEX: 21.83 KG/M2 | DIASTOLIC BLOOD PRESSURE: 60 MMHG | HEIGHT: 66 IN | RESPIRATION RATE: 16 BRPM

## 2022-09-07 DIAGNOSIS — C90.00 MULTIPLE MYELOMA NOT HAVING ACHIEVED REMISSION (HCC): Primary | ICD-10-CM

## 2022-09-07 LAB
ABO/RH: NORMAL
ALBUMIN SERPL-MCNC: 4.1 G/DL (ref 3.4–5)
ALP BLD-CCNC: 78 U/L (ref 40–129)
ALT SERPL-CCNC: 7 U/L (ref 10–40)
ANION GAP SERPL CALCULATED.3IONS-SCNC: 8 MMOL/L (ref 3–16)
ANTIBODY SCREEN: NORMAL
AST SERPL-CCNC: 12 U/L (ref 15–37)
BILIRUB SERPL-MCNC: 0.6 MG/DL (ref 0–1)
BILIRUBIN DIRECT: <0.2 MG/DL (ref 0–0.3)
BILIRUBIN, INDIRECT: ABNORMAL MG/DL (ref 0–1)
BUN BLDV-MCNC: 9 MG/DL (ref 7–20)
CALCIUM SERPL-MCNC: 9.2 MG/DL (ref 8.3–10.6)
CHLORIDE BLD-SCNC: 101 MMOL/L (ref 99–110)
CO2: 28 MMOL/L (ref 21–32)
CREAT SERPL-MCNC: 0.8 MG/DL (ref 0.9–1.3)
GFR AFRICAN AMERICAN: >60
GFR NON-AFRICAN AMERICAN: >60
GLUCOSE BLD-MCNC: 167 MG/DL (ref 70–99)
HCT VFR BLD CALC: 35.4 % (ref 40.5–52.5)
HEMOGLOBIN: 12.1 G/DL (ref 13.5–17.5)
LACTATE DEHYDROGENASE: 131 U/L (ref 100–190)
MAGNESIUM: 1.7 MG/DL (ref 1.8–2.4)
MCH RBC QN AUTO: 31.7 PG (ref 26–34)
MCHC RBC AUTO-ENTMCNC: 34.3 G/DL (ref 31–36)
MCV RBC AUTO: 92.4 FL (ref 80–100)
PDW BLD-RTO: 12.3 % (ref 12.4–15.4)
PHOSPHORUS: 3.7 MG/DL (ref 2.5–4.9)
PLATELET # BLD: 15 K/UL (ref 135–450)
PMV BLD AUTO: 7.8 FL (ref 5–10.5)
POTASSIUM SERPL-SCNC: 4 MMOL/L (ref 3.5–5.1)
RBC # BLD: 3.83 M/UL (ref 4.2–5.9)
SODIUM BLD-SCNC: 137 MMOL/L (ref 136–145)
TOTAL PROTEIN: 5.7 G/DL (ref 6.4–8.2)
URIC ACID, SERUM: 2 MG/DL (ref 3.5–7.2)
WBC # BLD: 0.1 K/UL (ref 4–11)

## 2022-09-07 PROCEDURE — 85025 COMPLETE CBC W/AUTO DIFF WBC: CPT

## 2022-09-07 PROCEDURE — 84100 ASSAY OF PHOSPHORUS: CPT

## 2022-09-07 PROCEDURE — 83615 LACTATE (LD) (LDH) ENZYME: CPT

## 2022-09-07 PROCEDURE — 80048 BASIC METABOLIC PNL TOTAL CA: CPT

## 2022-09-07 PROCEDURE — 2580000003 HC RX 258: Performed by: INTERNAL MEDICINE

## 2022-09-07 PROCEDURE — 96360 HYDRATION IV INFUSION INIT: CPT

## 2022-09-07 PROCEDURE — 2580000003 HC RX 258: Performed by: NURSE PRACTITIONER

## 2022-09-07 PROCEDURE — 80076 HEPATIC FUNCTION PANEL: CPT

## 2022-09-07 PROCEDURE — 96372 THER/PROPH/DIAG INJ SC/IM: CPT

## 2022-09-07 PROCEDURE — 86850 RBC ANTIBODY SCREEN: CPT

## 2022-09-07 PROCEDURE — 6360000002 HC RX W HCPCS: Performed by: NURSE PRACTITIONER

## 2022-09-07 PROCEDURE — 86900 BLOOD TYPING SEROLOGIC ABO: CPT

## 2022-09-07 PROCEDURE — 96361 HYDRATE IV INFUSION ADD-ON: CPT

## 2022-09-07 PROCEDURE — 96367 TX/PROPH/DG ADDL SEQ IV INF: CPT

## 2022-09-07 PROCEDURE — 96366 THER/PROPH/DIAG IV INF ADDON: CPT

## 2022-09-07 PROCEDURE — 83735 ASSAY OF MAGNESIUM: CPT

## 2022-09-07 PROCEDURE — 86901 BLOOD TYPING SEROLOGIC RH(D): CPT

## 2022-09-07 PROCEDURE — 84550 ASSAY OF BLOOD/URIC ACID: CPT

## 2022-09-07 PROCEDURE — 96365 THER/PROPH/DIAG IV INF INIT: CPT

## 2022-09-07 PROCEDURE — 36592 COLLECT BLOOD FROM PICC: CPT

## 2022-09-07 PROCEDURE — 6360000002 HC RX W HCPCS: Performed by: INTERNAL MEDICINE

## 2022-09-07 RX ORDER — OXYCODONE HYDROCHLORIDE 5 MG/1
10 TABLET ORAL EVERY 4 HOURS PRN
Status: CANCELLED | OUTPATIENT
Start: 2022-09-08

## 2022-09-07 RX ORDER — 0.9 % SODIUM CHLORIDE 0.9 %
1000 INTRAVENOUS SOLUTION INTRAVENOUS ONCE
Status: CANCELLED | OUTPATIENT
Start: 2022-09-08 | End: 2022-09-08

## 2022-09-07 RX ORDER — POTASSIUM CHLORIDE 20 MEQ/1
40 TABLET, EXTENDED RELEASE ORAL PRN
Status: DISCONTINUED | OUTPATIENT
Start: 2022-09-07 | End: 2022-09-08 | Stop reason: HOSPADM

## 2022-09-07 RX ORDER — MAGNESIUM SULFATE IN WATER 40 MG/ML
4000 INJECTION, SOLUTION INTRAVENOUS PRN
Status: DISCONTINUED | OUTPATIENT
Start: 2022-09-07 | End: 2022-09-08 | Stop reason: HOSPADM

## 2022-09-07 RX ORDER — FLUCONAZOLE 2 MG/ML
200 INJECTION, SOLUTION INTRAVENOUS
Status: COMPLETED | OUTPATIENT
Start: 2022-09-07 | End: 2022-09-07

## 2022-09-07 RX ORDER — SODIUM CHLORIDE 9 MG/ML
INJECTION, SOLUTION INTRAVENOUS CONTINUOUS PRN
Status: DISCONTINUED | OUTPATIENT
Start: 2022-09-07 | End: 2022-09-08 | Stop reason: HOSPADM

## 2022-09-07 RX ORDER — ONDANSETRON 4 MG/1
8 TABLET, FILM COATED ORAL EVERY 8 HOURS PRN
Status: CANCELLED | OUTPATIENT
Start: 2022-09-08

## 2022-09-07 RX ORDER — PROCHLORPERAZINE EDISYLATE 5 MG/ML
10 INJECTION INTRAMUSCULAR; INTRAVENOUS EVERY 6 HOURS PRN
Status: DISCONTINUED | OUTPATIENT
Start: 2022-09-07 | End: 2022-09-08 | Stop reason: HOSPADM

## 2022-09-07 RX ORDER — OXYCODONE HYDROCHLORIDE 5 MG/1
5 TABLET ORAL EVERY 4 HOURS PRN
Status: CANCELLED | OUTPATIENT
Start: 2022-09-08

## 2022-09-07 RX ORDER — OXYCODONE HYDROCHLORIDE 5 MG/1
5 TABLET ORAL EVERY 4 HOURS PRN
Status: DISCONTINUED | OUTPATIENT
Start: 2022-09-07 | End: 2022-09-08 | Stop reason: HOSPADM

## 2022-09-07 RX ORDER — PETROLATUM, MENTHOL, UNSPECIFIED FORM, CAMPHOR (SYNTHETIC), AND PHENOL 59.14; 1; 1; .6 G/100G; G/100G; G/100G; G/100G
PASTE TOPICAL PRN
Status: CANCELLED | OUTPATIENT
Start: 2022-09-08

## 2022-09-07 RX ORDER — PROCHLORPERAZINE MALEATE 10 MG
10 TABLET ORAL EVERY 6 HOURS PRN
Status: DISCONTINUED | OUTPATIENT
Start: 2022-09-07 | End: 2022-09-08 | Stop reason: HOSPADM

## 2022-09-07 RX ORDER — PROCHLORPERAZINE EDISYLATE 5 MG/ML
10 INJECTION INTRAMUSCULAR; INTRAVENOUS EVERY 6 HOURS PRN
Status: CANCELLED | OUTPATIENT
Start: 2022-09-08

## 2022-09-07 RX ORDER — POTASSIUM CHLORIDE 20 MEQ/1
40 TABLET, EXTENDED RELEASE ORAL PRN
Status: CANCELLED | OUTPATIENT
Start: 2022-09-08

## 2022-09-07 RX ORDER — DIMETHICONE, CAMPHOR (SYNTHETIC), MENTHOL, AND PHENOL 1.1; .5; .625; .5 G/100G; G/100G; G/100G; G/100G
OINTMENT TOPICAL PRN
Status: DISCONTINUED | OUTPATIENT
Start: 2022-09-07 | End: 2022-09-08 | Stop reason: HOSPADM

## 2022-09-07 RX ORDER — ONDANSETRON 2 MG/ML
8 INJECTION INTRAMUSCULAR; INTRAVENOUS EVERY 8 HOURS PRN
Status: CANCELLED | OUTPATIENT
Start: 2022-09-08

## 2022-09-07 RX ORDER — ONDANSETRON 4 MG/1
8 TABLET, FILM COATED ORAL EVERY 8 HOURS PRN
Status: DISCONTINUED | OUTPATIENT
Start: 2022-09-07 | End: 2022-09-08 | Stop reason: HOSPADM

## 2022-09-07 RX ORDER — MAGNESIUM SULFATE IN WATER 40 MG/ML
4000 INJECTION, SOLUTION INTRAVENOUS PRN
Status: CANCELLED | OUTPATIENT
Start: 2022-09-08

## 2022-09-07 RX ORDER — POTASSIUM CHLORIDE 29.8 MG/ML
80 INJECTION INTRAVENOUS PRN
Status: CANCELLED | OUTPATIENT
Start: 2022-09-08

## 2022-09-07 RX ORDER — ONDANSETRON 2 MG/ML
8 INJECTION INTRAMUSCULAR; INTRAVENOUS EVERY 8 HOURS PRN
Status: DISCONTINUED | OUTPATIENT
Start: 2022-09-07 | End: 2022-09-08 | Stop reason: HOSPADM

## 2022-09-07 RX ORDER — PROCHLORPERAZINE MALEATE 10 MG
10 TABLET ORAL EVERY 6 HOURS PRN
Status: CANCELLED | OUTPATIENT
Start: 2022-09-08

## 2022-09-07 RX ORDER — POTASSIUM CHLORIDE 29.8 MG/ML
20 INJECTION INTRAVENOUS PRN
Status: DISCONTINUED | OUTPATIENT
Start: 2022-09-07 | End: 2022-09-08 | Stop reason: HOSPADM

## 2022-09-07 RX ORDER — OXYCODONE HYDROCHLORIDE 5 MG/1
10 TABLET ORAL EVERY 4 HOURS PRN
Status: DISCONTINUED | OUTPATIENT
Start: 2022-09-07 | End: 2022-09-08 | Stop reason: HOSPADM

## 2022-09-07 RX ORDER — HEPARIN SODIUM (PORCINE) LOCK FLUSH IV SOLN 100 UNIT/ML 100 UNIT/ML
500 SOLUTION INTRAVENOUS PRN
Status: DISCONTINUED | OUTPATIENT
Start: 2022-09-07 | End: 2022-09-08 | Stop reason: HOSPADM

## 2022-09-07 RX ORDER — 0.9 % SODIUM CHLORIDE 0.9 %
1000 INTRAVENOUS SOLUTION INTRAVENOUS ONCE
Status: COMPLETED | OUTPATIENT
Start: 2022-09-07 | End: 2022-09-07

## 2022-09-07 RX ORDER — SODIUM CHLORIDE 9 MG/ML
INJECTION, SOLUTION INTRAVENOUS CONTINUOUS PRN
Status: CANCELLED | OUTPATIENT
Start: 2022-09-08

## 2022-09-07 RX ORDER — HEPARIN SODIUM (PORCINE) LOCK FLUSH IV SOLN 100 UNIT/ML 100 UNIT/ML
500 SOLUTION INTRAVENOUS PRN
Status: CANCELLED | OUTPATIENT
Start: 2022-09-08

## 2022-09-07 RX ADMIN — FLUCONAZOLE 200 MG: 200 INJECTION, SOLUTION INTRAVENOUS at 08:49

## 2022-09-07 RX ADMIN — SODIUM CHLORIDE, PRESERVATIVE FREE 500 UNITS: 5 INJECTION INTRAVENOUS at 11:24

## 2022-09-07 RX ADMIN — SODIUM CHLORIDE, PRESERVATIVE FREE 500 UNITS: 5 INJECTION INTRAVENOUS at 11:22

## 2022-09-07 RX ADMIN — FLUCONAZOLE 200 MG: 200 INJECTION, SOLUTION INTRAVENOUS at 10:04

## 2022-09-07 RX ADMIN — SODIUM CHLORIDE 1000 ML: 9 INJECTION, SOLUTION INTRAVENOUS at 08:48

## 2022-09-07 RX ADMIN — FILGRASTIM-AAFI 300 MCG: 300 INJECTION, SOLUTION SUBCUTANEOUS at 09:49

## 2022-09-07 RX ADMIN — ACYCLOVIR SODIUM 300 MG: 500 INJECTION, SOLUTION INTRAVENOUS at 09:44

## 2022-09-07 ASSESSMENT — PAIN SCALES - GENERAL: PAINLEVEL_OUTOF10: 0

## 2022-09-07 NOTE — PROGRESS NOTES
Pt arrived day+8 auto transplant. Pt continues with nausea, denies pain at this time. Pt denies emesis. Pt will begin IVPB Acyclovir 300mg and Fluconazole 400mg IVPB. LS CTA, bowel sounds active, last BM 9/6/2022. Reports one episode of diarrhea in the last 24hours. Pt continues with fatigue. No noted edema at this time. Pt up in chair, call light in reach, nonskid footwear on pt, hourly rounding in place, independent in room.  Electronically signed by Partha Miller RN on 9/7/2022 at 9:12 AM'

## 2022-09-07 NOTE — PROGRESS NOTES
[address] [facility]  Autologous Progress Note    2022    Bela Guadarrama    :  1975    MRN:  [id number]    Referring MD: Teodoro Butler MD  [address], [address],  [address]      Subjective: Nausea about the same. Diarrhea x1 in the last 24hrs. Afebrile. No other issues or concerns. ECOG PS:  (1) Restricted in physically strenuous activity, ambulatory and able to do work of light nature    KPS: 80% Normal activity with effort; some signs or symptoms of disease    Medications       Medication List        ASK your doctor about these medications      acyclovir 800 MG tablet  Commonly known as: Zovirax  Take 1 tablet by mouth 2 times daily     atorvastatin 20 MG tablet  Commonly known as: LIPITOR  Take 1 tablet by mouth every evening TAKE 1 TABLET DAILY     dicyclomine 10 MG capsule  Commonly known as: Bentyl  Take 1 capsule by mouth in the morning and 1 capsule at noon and 1 capsule in the evening and 1 capsule before bedtime. fluconazole 200 MG tablet  Commonly known as: Diflucan  Take 2 tablets by mouth daily     gabapentin 300 MG capsule  Commonly known as: NEURONTIN     levoFLOXacin 500 MG tablet  Commonly known as: LEVAQUIN  Take 1 tablet by mouth daily     lisinopril 20 MG tablet  Commonly known as: PRINIVIL;ZESTRIL  Take 1 tablet by mouth daily     OLANZapine 5 MG tablet  Commonly known as: ZyPREXA  Take 1 tablet by mouth nightly     ondansetron 8 MG tablet  Commonly known as: Zofran  Take 1 tablet by mouth every 8 (eight) hours for 10 days     pantoprazole 40 MG tablet  Commonly known as: PROTONIX  Take 1 tablet by mouth in the morning and 1 tablet in the evening. prochlorperazine 10 MG tablet  Commonly known as: COMPAZINE  Take 1 tablet by mouth in the morning and 1 tablet at noon and 1 tablet in the evening and 1 tablet before bedtime.      Vitamin C 500 MG Caps     VITAMIN D PO             ROS:  As noted above, otherwise remainder of 10-point ROS negative    Physical Exam:     I&O:  No intake or output data in the 24 hours ending 09/07/22 0936      Vital Signs:  BP 97/60   Pulse 84   Temp 98.1 °F (36.7 °C) (Oral)   Resp 16   Wt 135 lb 12.9 oz (61.6 kg)   SpO2 100%   BMI 21.92 kg/m²     Weight:    Wt Readings from Last 3 Encounters:   09/07/22 135 lb 12.9 oz (61.6 kg)   09/06/22 134 lb 4.2 oz (60.9 kg)   09/05/22 136 lb 3.9 oz (61.8 kg)       General: Awake, alert and oriented. HEENT: normocephalic, alopecia, PERRL, no scleral erythema or icterus, Oral mucosa moist and intact, throat clear  NECK: supple without palpable adenopathy  BACK: Straight negative CVAT  SKIN: warm dry and intact without lesions rashes or masses  CHEST: CTA bilaterally without use of accessory muscles  CV: Normal S1 S2, RRR, no MRG  ABD: NT ND normoactive BS, no palpable masses or hepatosplenomegaly  EXTREMITIES: without edema, denies calf tenderness  NEURO: CN II - XII grossly intact  CATHETER: Right Subclavian Trifusion (IR: Dianne, 7/22/22): CDI      Data:   CBC:   Recent Labs     09/05/22  0850 09/06/22  0859   WBC 1.2* 0.1*   HGB 13.1* 12.5*   HCT 37.7* 36.8*   MCV 92.3 93.0   PLT 38* 26*       BMP/Mag:  Recent Labs     09/05/22  0850 09/06/22  0859    138   K 4.4 4.1   CL 98* 102   CO2 28 29   PHOS 4.4 3.9   BUN 14 10   CREATININE 0.8* 0.8*   MG 1.70*  --        LIVP:   Recent Labs     09/05/22  0850   AST 16   ALT 11   BILIDIR <0.2   BILITOT 0.7   ALKPHOS 84       Uric Acid:    Recent Labs     09/05/22  0850   LABURIC 2.1*       Coags:   Recent Labs     09/05/22  0850   PROTIME 14.3   INR 1.12         PROBLEM LIST:            Multiple Myeloma  HTN  HLD  Positive H. pylori breath test  Emend hypersensitivity rxn (8/2022)  CINV      TREATMENT:            RVd on (4/11/22) x 4 cycles   [id number] & ASCT (8/30/22) w/ 4.84 x 10^6 CD34+ cells/kg     ASSESSMENT AND PLAN:            1.  IgG lambda myeloma, ISS stage II: VGPR  - Bone marrow biopsy (3/10/2022): showed 70% cellularity with plasma cells estimated to account for between 30-50%. 46,XY[20]. Myeloma ROBIN panel showed three copies of CCND1 (11q13. 3) in 83% of cells, four copies of CCND1 (11q13. 3) in 5% of cells, three copies of MAF (16q23.2) in 54% of cells, and monosomy 13 in 9% of cells. - PET/CT (3/9/22) - showed no evidence of hypermetabolism. - [id number] & ASCT (8/30/22) w/ 4.84 x 10^6 CD34+ cells/kg     Emend Hypersensitivity 8/29/22 including facial flushing, stomach cramping. D/C'ed and started PO Zyprexa 10 mg once 8/29/22 then 5 mg PO daily for 3 more days, extended an additional 3 days. Day + 8     2. ID:  Afebrile, no evidence of infection.    - Cont Levaquin, Acyclovir & Diflucan ppx     3. Heme: Anemia & thrombocytopenia d/t recent chemotherapy    - Transfuse for Hgb < 7 and Platelets < 21B  - No transfusion today  - Start daily G-CSF Day + 5 (9/4/22)     4. Metabolic: Mild Hyperglycemia & hypoNa w/ stable renal fxn stable. - Cont IVF hydration: 1 L NS each day in OP Infusion  - Replace potassium and magnesium per policy. 5. Nutrition / GI:    - Hx positive H. pylori breath   - EGD & Colonoscopy Bx (8/18/22) - negative   Nutrition:  Appetite and oral intake is okay  - Cont low microbial diet   - Follow with dietary  GI: Persistent nausea   - Cont PPI BID   - Cont Zofran q8hrs (scheduled 8/31/22) and Compazine q6hrs (prn). If nausea continues to improve will start to back off on the Zofran 9/6/22  - Cont Zyprexa 5 mg nightly; consider increasing to 10 mg nightly if nausea persists. - may need Ativan for nausea as anxiety may be contributing      6. Pulmonary: No active issues   - DLCO of 75% of predicted finding on ASCT workup: No obvious etiology for this  - CXR (8/29/22): Negative for acute disease     7. Prevention of SRE:    - PET/CT (3/9/22) - without bony disease.    - Hold off on a bisphosphonate.      8. Peripheral Neuropathy:   - Cont gabapentin 300 mg nighty (Consider increasing dose if symptoms persist)     9. Cardiac:    - H/o HTN  HTN:  stable   - S/p Lisinopril 20 mg daily (stopped 9/3/22)       - Disposition: Return to OP Infusion daily for toxicity assessment, labs and MD visit. The patient was seen and examined by Dr. Guy Swain. Jeff Arrieta.  Guy Swain, [address] [place] [address]

## 2022-09-07 NOTE — PROGRESS NOTES
Short Stay Communication Note  Katelynn Hartman  Diagnosis: Multiple Myeloma  Primary MD: Dr. Doreen Johnson  Treatment: Melphalan Fludarabine/Cytoxan  Day +8 of Auto Transplant   Pt seen in outpatient infusion today. Labs drawn and reviewed. CBC:   Recent Labs     09/05/22  0850 09/06/22  0859 09/07/22  0846   WBC 1.2* 0.1* 0.1*   HGB 13.1* 12.5* 12.1*   HCT 37.7* 36.8* 35.4*   MCV 92.3 93.0 92.4   PLT 38* 26* 15*     BMP/Mag:  Recent Labs     09/05/22  0850 09/06/22  0859 09/07/22  0846    138 137   K 4.4 4.1 4.0   CL 98* 102 101   CO2 28 29 28   PHOS 4.4 3.9 3.7   BUN 14 10 9   CREATININE 0.8* 0.8* 0.8*   MG 1.70*  --  1.70*     Standing parameters for replacement for this patient:   1 unit of pack red blood cells for a hemoglobin < or equal to 7.0  1 pack of platelets for a platelet count less than or equal to 10.0  40 MeQ of Potassium administered for a potassium level less than or equal to 3.4  4g of Magnesium Sulfate for a magnesum level less than or equal to 1.4  No transfusions required for the above lab values. Urinalysis last done: 9/5/2022 Urinalysis next due: 9/12/2022    Chest X-Ray last done: 9/5/2022 Chest X-Ray next due: 9/12/2022    Symptoms addressed and reported to care team this date:   Treatments this date: IV Fluids, Acyclovir 300mg IVPB, Fluconazole 400mg IVPB, granix injection    Reviewed medication schedule with pt and caregiver. Both able to verbalize all medications and schedule. Pt to be seen again tomorrow. Patient and caregiver verbalized understanding of discharge instructions including when and how to call the doctor and when to report  to the ER. Discharged ambulatory to home. CVC line care completed, heparin locked.  Electronically signed by Laura Blackman RN on 9/7/2022 at 11:26 AM

## 2022-09-07 NOTE — PROGRESS NOTES
Brief Oncology Nutrition Note    Nutritional summary: Brief RD check-in to follow up on ONS samples provided 9/6 and ++nasuea; po intake;hydration. RN expressed concerns pt only conuming pepsi and worried re: caffeine vs hydration. Pt typically only consume < 1-12 oz can while at infusion. Pt had not yet tried ONS RD provided; did tolerate chicken noodle soup at lunch and ramen for dinner; drank CIB this AM. Nausea remains barrier, but pt expressing he is hungry. No emesis and no additional diarrhea. RD encouraged PO; trial of ONS; use of antiemetics. RD continues to follow. ANTHROPOMETRICS  Current Height: 5' 6\" (167.6 cm)  Current Weight: 135 lb 12.9 oz (61.6 kg)      Consult dietitian if nutrition interventions essential to patient care is needed.      Lorin Avalos, RD, LD:

## 2022-09-08 ENCOUNTER — HOSPITAL ENCOUNTER (OUTPATIENT)
Dept: ONCOLOGY | Age: 47
Setting detail: INFUSION SERIES
Discharge: HOME | End: 2022-09-08
Payer: COMMERCIAL

## 2022-09-08 VITALS
OXYGEN SATURATION: 98 % | SYSTOLIC BLOOD PRESSURE: 96 MMHG | DIASTOLIC BLOOD PRESSURE: 59 MMHG | BODY MASS INDEX: 21.92 KG/M2 | RESPIRATION RATE: 16 BRPM | WEIGHT: 135.8 LBS | HEART RATE: 78 BPM | TEMPERATURE: 98.4 F

## 2022-09-08 DIAGNOSIS — C90.00 MULTIPLE MYELOMA NOT HAVING ACHIEVED REMISSION (HCC): Primary | ICD-10-CM

## 2022-09-08 LAB
ANION GAP SERPL CALCULATED.3IONS-SCNC: 10 MMOL/L (ref 3–16)
BLOOD BANK DISPENSE STATUS: NORMAL
BLOOD BANK PRODUCT CODE: NORMAL
BPU ID: NORMAL
BUN BLDV-MCNC: 6 MG/DL (ref 7–20)
CALCIUM SERPL-MCNC: 9 MG/DL (ref 8.3–10.6)
CHLORIDE BLD-SCNC: 101 MMOL/L (ref 99–110)
CO2: 27 MMOL/L (ref 21–32)
CREAT SERPL-MCNC: 0.8 MG/DL (ref 0.9–1.3)
DESCRIPTION BLOOD BANK: NORMAL
GFR AFRICAN AMERICAN: >60
GFR NON-AFRICAN AMERICAN: >60
GLUCOSE BLD-MCNC: 157 MG/DL (ref 70–99)
HCT VFR BLD CALC: 34 % (ref 40.5–52.5)
HEMOGLOBIN: 11.8 G/DL (ref 13.5–17.5)
INR BLD: 1.17 (ref 0.87–1.14)
MCH RBC QN AUTO: 31.6 PG (ref 26–34)
MCHC RBC AUTO-ENTMCNC: 34.7 G/DL (ref 31–36)
MCV RBC AUTO: 91.1 FL (ref 80–100)
OVALOCYTES: ABNORMAL
PDW BLD-RTO: 12.3 % (ref 12.4–15.4)
PHOSPHORUS: 3.2 MG/DL (ref 2.5–4.9)
PLATELET # BLD: 9 K/UL (ref 135–450)
PLATELET SLIDE REVIEW: ABNORMAL
PMV BLD AUTO: 7.8 FL (ref 5–10.5)
POTASSIUM SERPL-SCNC: 3.7 MMOL/L (ref 3.5–5.1)
PROTHROMBIN TIME: 14.9 SEC (ref 11.7–14.5)
RBC # BLD: 3.73 M/UL (ref 4.2–5.9)
SODIUM BLD-SCNC: 138 MMOL/L (ref 136–145)
WBC # BLD: 0.1 K/UL (ref 4–11)

## 2022-09-08 PROCEDURE — 96360 HYDRATION IV INFUSION INIT: CPT

## 2022-09-08 PROCEDURE — 2580000003 HC RX 258: Performed by: NURSE PRACTITIONER

## 2022-09-08 PROCEDURE — 80048 BASIC METABOLIC PNL TOTAL CA: CPT

## 2022-09-08 PROCEDURE — P9036 PLATELET PHERESIS IRRADIATED: HCPCS

## 2022-09-08 PROCEDURE — 36430 TRANSFUSION BLD/BLD COMPNT: CPT

## 2022-09-08 PROCEDURE — 84100 ASSAY OF PHOSPHORUS: CPT

## 2022-09-08 PROCEDURE — 6360000002 HC RX W HCPCS: Performed by: NURSE PRACTITIONER

## 2022-09-08 PROCEDURE — 2580000003 HC RX 258: Performed by: INTERNAL MEDICINE

## 2022-09-08 PROCEDURE — 36592 COLLECT BLOOD FROM PICC: CPT

## 2022-09-08 PROCEDURE — 85610 PROTHROMBIN TIME: CPT

## 2022-09-08 PROCEDURE — 96361 HYDRATE IV INFUSION ADD-ON: CPT

## 2022-09-08 PROCEDURE — 85025 COMPLETE CBC W/AUTO DIFF WBC: CPT

## 2022-09-08 PROCEDURE — 6360000002 HC RX W HCPCS: Performed by: INTERNAL MEDICINE

## 2022-09-08 RX ORDER — PROCHLORPERAZINE MALEATE 10 MG
10 TABLET ORAL EVERY 6 HOURS PRN
Status: CANCELLED | OUTPATIENT
Start: 2022-09-09

## 2022-09-08 RX ORDER — HEPARIN SODIUM (PORCINE) LOCK FLUSH IV SOLN 100 UNIT/ML 100 UNIT/ML
500 SOLUTION INTRAVENOUS PRN
Status: DISCONTINUED | OUTPATIENT
Start: 2022-09-08 | End: 2022-09-09 | Stop reason: HOSPADM

## 2022-09-08 RX ORDER — DIPHENHYDRAMINE HYDROCHLORIDE 50 MG/ML
50 INJECTION INTRAMUSCULAR; INTRAVENOUS ONCE
Status: DISCONTINUED | OUTPATIENT
Start: 2022-09-08 | End: 2022-09-09 | Stop reason: HOSPADM

## 2022-09-08 RX ORDER — PROCHLORPERAZINE EDISYLATE 5 MG/ML
10 INJECTION INTRAMUSCULAR; INTRAVENOUS EVERY 6 HOURS PRN
Status: CANCELLED | OUTPATIENT
Start: 2022-09-09

## 2022-09-08 RX ORDER — SODIUM CHLORIDE 0.9 % (FLUSH) 0.9 %
5-40 SYRINGE (ML) INJECTION PRN
OUTPATIENT
Start: 2022-09-08

## 2022-09-08 RX ORDER — 0.9 % SODIUM CHLORIDE 0.9 %
1000 INTRAVENOUS SOLUTION INTRAVENOUS ONCE
Status: COMPLETED | OUTPATIENT
Start: 2022-09-08 | End: 2022-09-08

## 2022-09-08 RX ORDER — ONDANSETRON 4 MG/1
8 TABLET, FILM COATED ORAL EVERY 8 HOURS PRN
Status: DISCONTINUED | OUTPATIENT
Start: 2022-09-08 | End: 2022-09-09 | Stop reason: HOSPADM

## 2022-09-08 RX ORDER — OXYCODONE HYDROCHLORIDE 5 MG/1
5 TABLET ORAL EVERY 4 HOURS PRN
Status: DISCONTINUED | OUTPATIENT
Start: 2022-09-08 | End: 2022-09-09 | Stop reason: HOSPADM

## 2022-09-08 RX ORDER — 0.9 % SODIUM CHLORIDE 0.9 %
1000 INTRAVENOUS SOLUTION INTRAVENOUS ONCE
Status: CANCELLED | OUTPATIENT
Start: 2022-09-09 | End: 2022-09-09

## 2022-09-08 RX ORDER — POTASSIUM CHLORIDE 29.8 MG/ML
20 INJECTION INTRAVENOUS PRN
Status: DISCONTINUED | OUTPATIENT
Start: 2022-09-08 | End: 2022-09-09 | Stop reason: HOSPADM

## 2022-09-08 RX ORDER — DIPHENHYDRAMINE HYDROCHLORIDE 50 MG/ML
50 INJECTION INTRAMUSCULAR; INTRAVENOUS ONCE
OUTPATIENT
Start: 2022-09-08 | End: 2022-09-08

## 2022-09-08 RX ORDER — SODIUM CHLORIDE 9 MG/ML
20 INJECTION, SOLUTION INTRAVENOUS CONTINUOUS
Status: DISCONTINUED | OUTPATIENT
Start: 2022-09-08 | End: 2022-09-09 | Stop reason: HOSPADM

## 2022-09-08 RX ORDER — SODIUM CHLORIDE 9 MG/ML
20 INJECTION, SOLUTION INTRAVENOUS CONTINUOUS
OUTPATIENT
Start: 2022-09-08

## 2022-09-08 RX ORDER — HEPARIN SODIUM (PORCINE) LOCK FLUSH IV SOLN 100 UNIT/ML 100 UNIT/ML
500 SOLUTION INTRAVENOUS PRN
Status: CANCELLED | OUTPATIENT
Start: 2022-09-09

## 2022-09-08 RX ORDER — SODIUM CHLORIDE 9 MG/ML
25 INJECTION, SOLUTION INTRAVENOUS PRN
OUTPATIENT
Start: 2022-09-08

## 2022-09-08 RX ORDER — MAGNESIUM SULFATE IN WATER 40 MG/ML
4000 INJECTION, SOLUTION INTRAVENOUS PRN
Status: DISCONTINUED | OUTPATIENT
Start: 2022-09-08 | End: 2022-09-09 | Stop reason: HOSPADM

## 2022-09-08 RX ORDER — SODIUM CHLORIDE 9 MG/ML
INJECTION, SOLUTION INTRAVENOUS CONTINUOUS
OUTPATIENT
Start: 2022-09-08

## 2022-09-08 RX ORDER — SODIUM CHLORIDE 9 MG/ML
INJECTION, SOLUTION INTRAVENOUS CONTINUOUS PRN
Status: DISCONTINUED | OUTPATIENT
Start: 2022-09-08 | End: 2022-09-09 | Stop reason: HOSPADM

## 2022-09-08 RX ORDER — PETROLATUM, MENTHOL, UNSPECIFIED FORM, CAMPHOR (SYNTHETIC), AND PHENOL 59.14; 1; 1; .6 G/100G; G/100G; G/100G; G/100G
PASTE TOPICAL PRN
Status: CANCELLED | OUTPATIENT
Start: 2022-09-09

## 2022-09-08 RX ORDER — PROCHLORPERAZINE EDISYLATE 5 MG/ML
10 INJECTION INTRAMUSCULAR; INTRAVENOUS EVERY 6 HOURS PRN
Status: DISCONTINUED | OUTPATIENT
Start: 2022-09-08 | End: 2022-09-09 | Stop reason: HOSPADM

## 2022-09-08 RX ORDER — SODIUM CHLORIDE 9 MG/ML
INJECTION, SOLUTION INTRAVENOUS CONTINUOUS
Status: DISCONTINUED | OUTPATIENT
Start: 2022-09-08 | End: 2022-09-09 | Stop reason: HOSPADM

## 2022-09-08 RX ORDER — SODIUM CHLORIDE 0.9 % (FLUSH) 0.9 %
5-40 SYRINGE (ML) INJECTION PRN
Status: DISCONTINUED | OUTPATIENT
Start: 2022-09-08 | End: 2022-09-09 | Stop reason: HOSPADM

## 2022-09-08 RX ORDER — 0.9 % SODIUM CHLORIDE 0.9 %
10 VIAL (ML) INJECTION ONCE
Status: DISCONTINUED | OUTPATIENT
Start: 2022-09-08 | End: 2022-09-09 | Stop reason: HOSPADM

## 2022-09-08 RX ORDER — ONDANSETRON 4 MG/1
8 TABLET, FILM COATED ORAL EVERY 8 HOURS PRN
Status: CANCELLED | OUTPATIENT
Start: 2022-09-09

## 2022-09-08 RX ORDER — SODIUM CHLORIDE 9 MG/ML
25 INJECTION, SOLUTION INTRAVENOUS PRN
Status: DISCONTINUED | OUTPATIENT
Start: 2022-09-08 | End: 2022-09-09 | Stop reason: HOSPADM

## 2022-09-08 RX ORDER — POTASSIUM CHLORIDE 29.8 MG/ML
80 INJECTION INTRAVENOUS PRN
Status: CANCELLED | OUTPATIENT
Start: 2022-09-09

## 2022-09-08 RX ORDER — ONDANSETRON 2 MG/ML
8 INJECTION INTRAMUSCULAR; INTRAVENOUS EVERY 8 HOURS PRN
Status: CANCELLED | OUTPATIENT
Start: 2022-09-09

## 2022-09-08 RX ORDER — METHYLPREDNISOLONE SODIUM SUCCINATE 125 MG/2ML
125 INJECTION, POWDER, LYOPHILIZED, FOR SOLUTION INTRAMUSCULAR; INTRAVENOUS ONCE
OUTPATIENT
Start: 2022-09-08 | End: 2022-09-08

## 2022-09-08 RX ORDER — POTASSIUM CHLORIDE 20 MEQ/1
40 TABLET, EXTENDED RELEASE ORAL PRN
Status: DISCONTINUED | OUTPATIENT
Start: 2022-09-08 | End: 2022-09-09 | Stop reason: HOSPADM

## 2022-09-08 RX ORDER — OXYCODONE HYDROCHLORIDE 5 MG/1
10 TABLET ORAL EVERY 4 HOURS PRN
Status: CANCELLED | OUTPATIENT
Start: 2022-09-09

## 2022-09-08 RX ORDER — POTASSIUM CHLORIDE 20 MEQ/1
40 TABLET, EXTENDED RELEASE ORAL PRN
Status: CANCELLED | OUTPATIENT
Start: 2022-09-09

## 2022-09-08 RX ORDER — SODIUM CHLORIDE 9 MG/ML
INJECTION, SOLUTION INTRAVENOUS CONTINUOUS PRN
Status: CANCELLED | OUTPATIENT
Start: 2022-09-09

## 2022-09-08 RX ORDER — ONDANSETRON 2 MG/ML
8 INJECTION INTRAMUSCULAR; INTRAVENOUS EVERY 8 HOURS PRN
Status: DISCONTINUED | OUTPATIENT
Start: 2022-09-08 | End: 2022-09-09 | Stop reason: HOSPADM

## 2022-09-08 RX ORDER — DIMETHICONE, CAMPHOR (SYNTHETIC), MENTHOL, AND PHENOL 1.1; .5; .625; .5 G/100G; G/100G; G/100G; G/100G
OINTMENT TOPICAL PRN
Status: DISCONTINUED | OUTPATIENT
Start: 2022-09-08 | End: 2022-09-09 | Stop reason: HOSPADM

## 2022-09-08 RX ORDER — MAGNESIUM SULFATE IN WATER 40 MG/ML
4000 INJECTION, SOLUTION INTRAVENOUS PRN
Status: CANCELLED | OUTPATIENT
Start: 2022-09-09

## 2022-09-08 RX ORDER — METHYLPREDNISOLONE SODIUM SUCCINATE 125 MG/2ML
125 INJECTION, POWDER, LYOPHILIZED, FOR SOLUTION INTRAMUSCULAR; INTRAVENOUS ONCE
Status: DISCONTINUED | OUTPATIENT
Start: 2022-09-08 | End: 2022-09-09 | Stop reason: HOSPADM

## 2022-09-08 RX ORDER — PROCHLORPERAZINE MALEATE 10 MG
10 TABLET ORAL EVERY 6 HOURS PRN
Status: DISCONTINUED | OUTPATIENT
Start: 2022-09-08 | End: 2022-09-09 | Stop reason: HOSPADM

## 2022-09-08 RX ORDER — OLANZAPINE 5 MG/1
10 TABLET ORAL NIGHTLY
Qty: 20 TABLET | Refills: 0 | OUTPATIENT
Start: 2022-09-08 | End: 2022-09-12

## 2022-09-08 RX ORDER — OXYCODONE HYDROCHLORIDE 5 MG/1
10 TABLET ORAL EVERY 4 HOURS PRN
Status: DISCONTINUED | OUTPATIENT
Start: 2022-09-08 | End: 2022-09-09 | Stop reason: HOSPADM

## 2022-09-08 RX ORDER — OXYCODONE HYDROCHLORIDE 5 MG/1
5 TABLET ORAL EVERY 4 HOURS PRN
Status: CANCELLED | OUTPATIENT
Start: 2022-09-09

## 2022-09-08 RX ORDER — FLUCONAZOLE 2 MG/ML
200 INJECTION, SOLUTION INTRAVENOUS
Status: COMPLETED | OUTPATIENT
Start: 2022-09-08 | End: 2022-09-08

## 2022-09-08 RX ORDER — 0.9 % SODIUM CHLORIDE 0.9 %
10 VIAL (ML) INJECTION ONCE
OUTPATIENT
Start: 2022-09-08 | End: 2022-09-08

## 2022-09-08 RX ADMIN — FLUCONAZOLE 200 MG: 200 INJECTION, SOLUTION INTRAVENOUS at 09:59

## 2022-09-08 RX ADMIN — ACYCLOVIR SODIUM 300 MG: 500 INJECTION, SOLUTION INTRAVENOUS at 08:59

## 2022-09-08 RX ADMIN — FILGRASTIM-AAFI 300 MCG: 300 INJECTION, SOLUTION SUBCUTANEOUS at 08:49

## 2022-09-08 RX ADMIN — FLUCONAZOLE 200 MG: 200 INJECTION, SOLUTION INTRAVENOUS at 08:51

## 2022-09-08 RX ADMIN — SODIUM CHLORIDE 1000 ML: 9 INJECTION, SOLUTION INTRAVENOUS at 08:38

## 2022-09-08 RX ADMIN — SODIUM CHLORIDE, PRESERVATIVE FREE 500 UNITS: 5 INJECTION INTRAVENOUS at 11:44

## 2022-09-08 NOTE — PROGRESS NOTES
[address] [facility]  Autologous Progress Note    2022    Lisy Mathur    :  1975    MRN:  [id number]    Referring MD: MD Pepper Loera 1945,  [address]      Subjective: Persistent nausea, no vomiting, no fevers, appetite decreased but pushing fluids. Other than nausea, patient has energy and feels pretty good. ECOG PS:  (1) Restricted in physically strenuous activity, ambulatory and able to do work of light nature    KPS: 80% Normal activity with effort; some signs or symptoms of disease    Medications       Medication List        ASK your doctor about these medications      acyclovir 800 MG tablet  Commonly known as: Zovirax  Take 1 tablet by mouth 2 times daily     atorvastatin 20 MG tablet  Commonly known as: LIPITOR  Take 1 tablet by mouth every evening TAKE 1 TABLET DAILY     dicyclomine 10 MG capsule  Commonly known as: Bentyl  Take 1 capsule by mouth in the morning and 1 capsule at noon and 1 capsule in the evening and 1 capsule before bedtime. fluconazole 200 MG tablet  Commonly known as: Diflucan  Take 2 tablets by mouth daily     gabapentin 300 MG capsule  Commonly known as: NEURONTIN     levoFLOXacin 500 MG tablet  Commonly known as: LEVAQUIN  Take 1 tablet by mouth daily     lisinopril 20 MG tablet  Commonly known as: PRINIVIL;ZESTRIL  Take 1 tablet by mouth daily     OLANZapine 5 MG tablet  Commonly known as: ZyPREXA  Take 1 tablet by mouth nightly     ondansetron 8 MG tablet  Commonly known as: Zofran  Take 1 tablet by mouth every 8 (eight) hours for 10 days     pantoprazole 40 MG tablet  Commonly known as: PROTONIX  Take 1 tablet by mouth in the morning and 1 tablet in the evening. prochlorperazine 10 MG tablet  Commonly known as: COMPAZINE  Take 1 tablet by mouth in the morning and 1 tablet at noon and 1 tablet in the evening and 1 tablet before bedtime.      Vitamin C 500 MG Caps     VITAMIN D PO             ROS:  As noted above, otherwise remainder of 10-point ROS negative    Physical Exam:     I&O:  No intake or output data in the 24 hours ending 09/08/22 0823      Vital Signs: There were no vitals taken for this visit. Weight:    Wt Readings from Last 3 Encounters:   09/07/22 135 lb 12.9 oz (61.6 kg)   09/06/22 134 lb 4.2 oz (60.9 kg)   09/05/22 136 lb 3.9 oz (61.8 kg)       General: Awake, alert and oriented. HEENT: normocephalic, alopecia, PERRL, no scleral erythema or icterus, Oral mucosa moist and intact, throat clear  NECK: supple without palpable adenopathy  BACK: Straight negative CVAT  SKIN: warm dry and intact without lesions rashes or masses  CHEST: CTA bilaterally without use of accessory muscles  CV: Normal S1 S2, RRR, no MRG  ABD: NT ND normoactive BS, no palpable masses or hepatosplenomegaly  EXTREMITIES: without edema, denies calf tenderness  NEURO: CN II - XII grossly intact  CATHETER: Right Subclavian Trifusion (IR: Dianne, 7/22/22): CDI      Data:   CBC:   Recent Labs     09/05/22  0850 09/06/22  0859 09/07/22  0846   WBC 1.2* 0.1* 0.1*   HGB 13.1* 12.5* 12.1*   HCT 37.7* 36.8* 35.4*   MCV 92.3 93.0 92.4   PLT 38* 26* 15*       BMP/Mag:  Recent Labs     09/05/22  0850 09/06/22  0859 09/07/22  0846    138 137   K 4.4 4.1 4.0   CL 98* 102 101   CO2 28 29 28   PHOS 4.4 3.9 3.7   BUN 14 10 9   CREATININE 0.8* 0.8* 0.8*   MG 1.70*  --  1.70*       LIVP:   Recent Labs     09/05/22  0850 09/07/22  0846   AST 16 12*   ALT 11 7*   BILIDIR <0.2 <0.2   BILITOT 0.7 0.6   ALKPHOS 84 78       Uric Acid:    Recent Labs     09/07/22  0846   LABURIC 2.0*       Coags:   Recent Labs     09/05/22  0850   PROTIME 14.3   INR 1.12         PROBLEM LIST:            Multiple Myeloma  HTN  HLD  Positive H. pylori breath test  Emend hypersensitivity rxn (8/2022)  CINV      TREATMENT:            RVd on (4/11/22) x 4 cycles   [id number] & ASCT (8/30/22) w/ 4.84 x 10^6 CD34+ cells/kg     ASSESSMENT AND PLAN:            1.  IgG lambda myeloma, ISS stage II: VGPR  - Bone marrow biopsy (3/10/2022): showed 70% cellularity with plasma cells estimated to account for between 30-50%. 46,XY[20]. Myeloma ROBIN panel showed three copies of CCND1 (11q13. 3) in 83% of cells, four copies of CCND1 (11q13. 3) in 5% of cells, three copies of MAF (16q23.2) in 54% of cells, and monosomy 13 in 9% of cells. - PET/CT (3/9/22) - showed no evidence of hypermetabolism. - [id number] & ASCT (8/30/22) w/ 4.84 x 10^6 CD34+ cells/kg     Emend Hypersensitivity 8/29/22 including facial flushing, stomach cramping. D/C'ed and started PO Zyprexa 10 mg once 8/29/22 then 5 mg PO daily for 3 more days, extended an additional 3 days. Day + 9     2. ID:  Afebrile, no evidence of infection.    - Cont Levaquin, Acyclovir & Diflucan ppx     3. Heme: Anemia & thrombocytopenia d/t recent chemotherapy    - Transfuse for Hgb < 7 and Platelets < 70O  - Plt transfusion today  - Cont daily G-CSF Day + 5 (9/4/22)     4. Metabolic: Mild Hyperglycemia w/ stable renal fxn stable. - Cont IVF hydration: 1 L NS each day in OP Infusion  - Replace potassium and magnesium per policy. 5. Nutrition / GI:    - Hx positive H. pylori breath   - EGD & Colonoscopy Bx (8/18/22) - negative   Nutrition:  Appetite and oral intake is okay  - Cont low microbial diet   - Follow with dietary  GI: Persistent nausea   - Cont PPI BID   - Cont Zofran q8hrs (scheduled 8/31/22) and Compazine q6hrs (prn). If nausea continues to improve will start to back off on the Zofran 9/6/22  - Cont Zyprexa 5 mg nightly; increase to 10 mg nightly (9/8/22)    - may need Ativan for nausea as anxiety may be contributing   - Give IV Fluconazole and IV Acyclovir (morning dose) in OP Infusion each morning unless nausea subsides (started 9/6/22)     6. Pulmonary: No active issues   - DLCO of 75% of predicted finding on ASCT workup: No obvious etiology for this  - CXR (8/29/22): Negative for acute disease     7.  Prevention of SRE:    - PET/CT (3/9/22) - without bony disease.    - Hold off on a bisphosphonate. 8. Peripheral Neuropathy:   - Cont gabapentin 300 mg nighty (Consider increasing dose if symptoms persist)     9. Cardiac:    - H/o HTN  HTN:  stable   - S/p Lisinopril 20 mg daily (stopped 9/3/22)       - Disposition: Return to OP Infusion daily for toxicity assessment, labs and MD visit. The patient was seen and examined by Dr. Anthony Burdick.     Shayna Ruiz, APRN - NP

## 2022-09-08 NOTE — PROGRESS NOTES
Short Stay Communication Note  Sana Bush  Diagnosis: Multiple Myeloma  Primary MD: Dr. Davis Rutledge  Treatment: Melphalan Fludarabine/Cytoxan  Day +9 of Auto Transplant   Pt seen in outpatient infusion today. Labs drawn and reviewed. CBC:   Recent Labs     09/06/22  0859 09/07/22  0846 09/08/22  0910   WBC 0.1* 0.1* 0.1*   HGB 12.5* 12.1* 11.8*   HCT 36.8* 35.4* 34.0*   MCV 93.0 92.4 91.1   PLT 26* 15* 9*       BMP/Mag:  Recent Labs     09/06/22  0859 09/07/22  0846 09/08/22  0910    137 138   K 4.1 4.0 3.7    101 101   CO2 29 28 27   PHOS 3.9 3.7 3.2   BUN 10 9 6*   CREATININE 0.8* 0.8* 0.8*   MG  --  1.70*  --        Standing parameters for replacement for this patient:   1 unit of pack red blood cells for a hemoglobin < or equal to 7.0  1 pack of platelets for a platelet count less than or equal to 10.0  40 MeQ of Potassium administered for a potassium level less than or equal to 3.4  4g of Magnesium Sulfate for a magnesum level less than or equal to 1.4  Platelets transfused per orders for above lab values. Urinalysis last done: 9/5/2022 Urinalysis next due: 9/12/2022    Chest X-Ray last done: 9/5/2022 Chest X-Ray next due: 9/12/2022    Symptoms addressed and reported to care team this date:   Treatments this date: IV Fluids, Acyclovir 300mg IVPB, Fluconazole 400mg IVPB, granix injection, platelets    Reviewed medication schedule with pt and caregiver. Both able to verbalize all medications and schedule. Pt to be seen again tomorrow. Patient and caregiver verbalized understanding of discharge instructions including when and how to call the doctor and when to report  to the ER. Discharged ambulatory to home. CVC line care completed, heparin locked.      Electronically signed by Stacy Sosa RN on 9/8/2022 at 11:34 AM

## 2022-09-09 ENCOUNTER — HOSPITAL ENCOUNTER (OUTPATIENT)
Dept: ONCOLOGY | Age: 47
Setting detail: INFUSION SERIES
Discharge: HOME | End: 2022-09-09
Payer: COMMERCIAL

## 2022-09-09 VITALS
RESPIRATION RATE: 16 BRPM | TEMPERATURE: 99.3 F | WEIGHT: 135.8 LBS | OXYGEN SATURATION: 98 % | DIASTOLIC BLOOD PRESSURE: 67 MMHG | HEART RATE: 90 BPM | BODY MASS INDEX: 21.92 KG/M2 | SYSTOLIC BLOOD PRESSURE: 98 MMHG

## 2022-09-09 DIAGNOSIS — C90.00 MULTIPLE MYELOMA NOT HAVING ACHIEVED REMISSION (HCC): Primary | ICD-10-CM

## 2022-09-09 LAB
ALBUMIN SERPL-MCNC: 3.9 G/DL (ref 3.4–5)
ALP BLD-CCNC: 84 U/L (ref 40–129)
ALT SERPL-CCNC: 10 U/L (ref 10–40)
ANION GAP SERPL CALCULATED.3IONS-SCNC: 7 MMOL/L (ref 3–16)
AST SERPL-CCNC: 12 U/L (ref 15–37)
BILIRUB SERPL-MCNC: 0.3 MG/DL (ref 0–1)
BILIRUBIN DIRECT: <0.2 MG/DL (ref 0–0.3)
BILIRUBIN, INDIRECT: ABNORMAL MG/DL (ref 0–1)
BUN BLDV-MCNC: 5 MG/DL (ref 7–20)
CALCIUM SERPL-MCNC: 9 MG/DL (ref 8.3–10.6)
CHLORIDE BLD-SCNC: 101 MMOL/L (ref 99–110)
CO2: 28 MMOL/L (ref 21–32)
CREAT SERPL-MCNC: 0.9 MG/DL (ref 0.9–1.3)
GFR AFRICAN AMERICAN: >60
GFR NON-AFRICAN AMERICAN: >60
GLUCOSE BLD-MCNC: 163 MG/DL (ref 70–99)
HCT VFR BLD CALC: 31.8 % (ref 40.5–52.5)
HEMOGLOBIN: 11.5 G/DL (ref 13.5–17.5)
LACTATE DEHYDROGENASE: 135 U/L (ref 100–190)
MAGNESIUM: 1.4 MG/DL (ref 1.8–2.4)
MCH RBC QN AUTO: 32.6 PG (ref 26–34)
MCHC RBC AUTO-ENTMCNC: 36.1 G/DL (ref 31–36)
MCV RBC AUTO: 90.3 FL (ref 80–100)
PDW BLD-RTO: 11.9 % (ref 12.4–15.4)
PHOSPHORUS: 3.2 MG/DL (ref 2.5–4.9)
PLATELET # BLD: 31 K/UL (ref 135–450)
PLATELET SLIDE REVIEW: ABNORMAL
PMV BLD AUTO: 8.8 FL (ref 5–10.5)
POTASSIUM SERPL-SCNC: 3.6 MMOL/L (ref 3.5–5.1)
RBC # BLD: 3.52 M/UL (ref 4.2–5.9)
RBC # BLD: NORMAL 10*6/UL
SODIUM BLD-SCNC: 136 MMOL/L (ref 136–145)
TOTAL PROTEIN: 5.7 G/DL (ref 6.4–8.2)
URIC ACID, SERUM: 1.7 MG/DL (ref 3.5–7.2)
WBC # BLD: 0.3 K/UL (ref 4–11)

## 2022-09-09 PROCEDURE — 96361 HYDRATE IV INFUSION ADD-ON: CPT

## 2022-09-09 PROCEDURE — 6360000002 HC RX W HCPCS: Performed by: NURSE PRACTITIONER

## 2022-09-09 PROCEDURE — 2580000003 HC RX 258: Performed by: NURSE PRACTITIONER

## 2022-09-09 PROCEDURE — 96365 THER/PROPH/DIAG IV INF INIT: CPT

## 2022-09-09 PROCEDURE — 96360 HYDRATION IV INFUSION INIT: CPT

## 2022-09-09 PROCEDURE — 84100 ASSAY OF PHOSPHORUS: CPT

## 2022-09-09 PROCEDURE — 80076 HEPATIC FUNCTION PANEL: CPT

## 2022-09-09 PROCEDURE — 83735 ASSAY OF MAGNESIUM: CPT

## 2022-09-09 PROCEDURE — 96366 THER/PROPH/DIAG IV INF ADDON: CPT

## 2022-09-09 PROCEDURE — 83615 LACTATE (LD) (LDH) ENZYME: CPT

## 2022-09-09 PROCEDURE — 96372 THER/PROPH/DIAG INJ SC/IM: CPT

## 2022-09-09 PROCEDURE — 85025 COMPLETE CBC W/AUTO DIFF WBC: CPT

## 2022-09-09 PROCEDURE — 2580000003 HC RX 258: Performed by: INTERNAL MEDICINE

## 2022-09-09 PROCEDURE — 6360000002 HC RX W HCPCS: Performed by: INTERNAL MEDICINE

## 2022-09-09 PROCEDURE — 84550 ASSAY OF BLOOD/URIC ACID: CPT

## 2022-09-09 PROCEDURE — 36592 COLLECT BLOOD FROM PICC: CPT

## 2022-09-09 PROCEDURE — 80048 BASIC METABOLIC PNL TOTAL CA: CPT

## 2022-09-09 PROCEDURE — 96367 TX/PROPH/DG ADDL SEQ IV INF: CPT

## 2022-09-09 RX ORDER — MAGNESIUM SULFATE IN WATER 40 MG/ML
2000 INJECTION, SOLUTION INTRAVENOUS ONCE
Status: COMPLETED | OUTPATIENT
Start: 2022-09-09 | End: 2022-09-09

## 2022-09-09 RX ORDER — POTASSIUM CHLORIDE 29.8 MG/ML
20 INJECTION INTRAVENOUS PRN
Status: DISCONTINUED | OUTPATIENT
Start: 2022-09-09 | End: 2022-09-10 | Stop reason: HOSPADM

## 2022-09-09 RX ORDER — ONDANSETRON 4 MG/1
8 TABLET, FILM COATED ORAL EVERY 8 HOURS PRN
Status: DISCONTINUED | OUTPATIENT
Start: 2022-09-09 | End: 2022-09-10 | Stop reason: HOSPADM

## 2022-09-09 RX ORDER — 0.9 % SODIUM CHLORIDE 0.9 %
1000 INTRAVENOUS SOLUTION INTRAVENOUS ONCE
Status: COMPLETED | OUTPATIENT
Start: 2022-09-09 | End: 2022-09-09

## 2022-09-09 RX ORDER — 0.9 % SODIUM CHLORIDE 0.9 %
1000 INTRAVENOUS SOLUTION INTRAVENOUS ONCE
Status: CANCELLED | OUTPATIENT
Start: 2022-09-10 | End: 2022-09-10

## 2022-09-09 RX ORDER — HEPARIN SODIUM (PORCINE) LOCK FLUSH IV SOLN 100 UNIT/ML 100 UNIT/ML
500 SOLUTION INTRAVENOUS PRN
Status: DISCONTINUED | OUTPATIENT
Start: 2022-09-09 | End: 2022-09-10 | Stop reason: HOSPADM

## 2022-09-09 RX ORDER — DIMETHICONE, CAMPHOR (SYNTHETIC), MENTHOL, AND PHENOL 1.1; .5; .625; .5 G/100G; G/100G; G/100G; G/100G
OINTMENT TOPICAL PRN
Status: DISCONTINUED | OUTPATIENT
Start: 2022-09-09 | End: 2022-09-10 | Stop reason: HOSPADM

## 2022-09-09 RX ORDER — POTASSIUM CHLORIDE 20 MEQ/1
40 TABLET, EXTENDED RELEASE ORAL PRN
Status: CANCELLED | OUTPATIENT
Start: 2022-09-10

## 2022-09-09 RX ORDER — OXYCODONE HYDROCHLORIDE 5 MG/1
10 TABLET ORAL EVERY 4 HOURS PRN
Status: CANCELLED | OUTPATIENT
Start: 2022-09-10

## 2022-09-09 RX ORDER — PROCHLORPERAZINE MALEATE 10 MG
10 TABLET ORAL EVERY 6 HOURS PRN
Status: CANCELLED | OUTPATIENT
Start: 2022-09-10

## 2022-09-09 RX ORDER — OXYCODONE HYDROCHLORIDE 5 MG/1
5 TABLET ORAL EVERY 4 HOURS PRN
Status: DISCONTINUED | OUTPATIENT
Start: 2022-09-09 | End: 2022-09-10 | Stop reason: HOSPADM

## 2022-09-09 RX ORDER — PROCHLORPERAZINE EDISYLATE 5 MG/ML
10 INJECTION INTRAMUSCULAR; INTRAVENOUS EVERY 6 HOURS PRN
Status: CANCELLED | OUTPATIENT
Start: 2022-09-10

## 2022-09-09 RX ORDER — ONDANSETRON 2 MG/ML
8 INJECTION INTRAMUSCULAR; INTRAVENOUS EVERY 8 HOURS PRN
Status: CANCELLED | OUTPATIENT
Start: 2022-09-10

## 2022-09-09 RX ORDER — OXYCODONE HYDROCHLORIDE 5 MG/1
5 TABLET ORAL EVERY 4 HOURS PRN
Status: CANCELLED | OUTPATIENT
Start: 2022-09-10

## 2022-09-09 RX ORDER — MAGNESIUM SULFATE IN WATER 40 MG/ML
4000 INJECTION, SOLUTION INTRAVENOUS PRN
Status: DISCONTINUED | OUTPATIENT
Start: 2022-09-09 | End: 2022-09-10 | Stop reason: HOSPADM

## 2022-09-09 RX ORDER — SODIUM CHLORIDE 9 MG/ML
INJECTION, SOLUTION INTRAVENOUS CONTINUOUS PRN
Status: CANCELLED | OUTPATIENT
Start: 2022-09-10

## 2022-09-09 RX ORDER — HEPARIN SODIUM (PORCINE) LOCK FLUSH IV SOLN 100 UNIT/ML 100 UNIT/ML
500 SOLUTION INTRAVENOUS PRN
Status: CANCELLED | OUTPATIENT
Start: 2022-09-10

## 2022-09-09 RX ORDER — OXYCODONE HYDROCHLORIDE 5 MG/1
10 TABLET ORAL EVERY 4 HOURS PRN
Status: DISCONTINUED | OUTPATIENT
Start: 2022-09-09 | End: 2022-09-10 | Stop reason: HOSPADM

## 2022-09-09 RX ORDER — POTASSIUM CHLORIDE 20 MEQ/1
40 TABLET, EXTENDED RELEASE ORAL PRN
Status: DISCONTINUED | OUTPATIENT
Start: 2022-09-09 | End: 2022-09-10 | Stop reason: HOSPADM

## 2022-09-09 RX ORDER — PETROLATUM, MENTHOL, UNSPECIFIED FORM, CAMPHOR (SYNTHETIC), AND PHENOL 59.14; 1; 1; .6 G/100G; G/100G; G/100G; G/100G
PASTE TOPICAL PRN
Status: CANCELLED | OUTPATIENT
Start: 2022-09-10

## 2022-09-09 RX ORDER — CLOTRIMAZOLE 10 MG/1
10 LOZENGE ORAL; TOPICAL 4 TIMES DAILY
Qty: 28 TABLET | Refills: 0 | Status: SHIPPED | OUTPATIENT
Start: 2022-09-09 | End: 2022-09-16

## 2022-09-09 RX ORDER — SODIUM CHLORIDE 9 MG/ML
INJECTION, SOLUTION INTRAVENOUS CONTINUOUS PRN
Status: DISCONTINUED | OUTPATIENT
Start: 2022-09-09 | End: 2022-09-10 | Stop reason: HOSPADM

## 2022-09-09 RX ORDER — ONDANSETRON 4 MG/1
8 TABLET, FILM COATED ORAL EVERY 8 HOURS PRN
Status: CANCELLED | OUTPATIENT
Start: 2022-09-10

## 2022-09-09 RX ORDER — ONDANSETRON 2 MG/ML
8 INJECTION INTRAMUSCULAR; INTRAVENOUS EVERY 8 HOURS PRN
Status: DISCONTINUED | OUTPATIENT
Start: 2022-09-09 | End: 2022-09-10 | Stop reason: HOSPADM

## 2022-09-09 RX ORDER — MAGNESIUM SULFATE IN WATER 40 MG/ML
4000 INJECTION, SOLUTION INTRAVENOUS PRN
Status: CANCELLED | OUTPATIENT
Start: 2022-09-10

## 2022-09-09 RX ORDER — PROCHLORPERAZINE EDISYLATE 5 MG/ML
10 INJECTION INTRAMUSCULAR; INTRAVENOUS EVERY 6 HOURS PRN
Status: DISCONTINUED | OUTPATIENT
Start: 2022-09-09 | End: 2022-09-10 | Stop reason: HOSPADM

## 2022-09-09 RX ORDER — PROCHLORPERAZINE MALEATE 10 MG
10 TABLET ORAL EVERY 6 HOURS PRN
Status: DISCONTINUED | OUTPATIENT
Start: 2022-09-09 | End: 2022-09-10 | Stop reason: HOSPADM

## 2022-09-09 RX ORDER — POTASSIUM CHLORIDE 29.8 MG/ML
80 INJECTION INTRAVENOUS PRN
Status: CANCELLED | OUTPATIENT
Start: 2022-09-10

## 2022-09-09 RX ADMIN — SODIUM CHLORIDE 1000 ML: 9 INJECTION, SOLUTION INTRAVENOUS at 08:28

## 2022-09-09 RX ADMIN — SODIUM CHLORIDE, PRESERVATIVE FREE 500 UNITS: 5 INJECTION INTRAVENOUS at 12:46

## 2022-09-09 RX ADMIN — MAGNESIUM SULFATE IN WATER 2000 MG: 40 INJECTION, SOLUTION INTRAVENOUS at 10:46

## 2022-09-09 RX ADMIN — ACYCLOVIR SODIUM 300 MG: 500 INJECTION, SOLUTION INTRAVENOUS at 08:48

## 2022-09-09 RX ADMIN — FILGRASTIM-AAFI 300 MCG: 480 INJECTION, SOLUTION SUBCUTANEOUS at 08:43

## 2022-09-09 RX ADMIN — FLUCONAZOLE 400 MG: 200 INJECTION, SOLUTION INTRAVENOUS at 08:43

## 2022-09-09 NOTE — PROGRESS NOTES
[address]  Autologous Progress Note    2022    Steven Lucas    :  1975    MRN:  [id number]    Referring MD: MD Pepper Moreno 1945,  [address]      Subjective: Persistent nausea, no vomiting, no fevers, appetite decreased but pushing fluids. Other than nausea, patient has energy and feels pretty good. ECOG PS:  (1) Restricted in physically strenuous activity, ambulatory and able to do work of light nature    KPS: 80% Normal activity with effort; some signs or symptoms of disease    Medications       Medication List        ASK your doctor about these medications      acyclovir 800 MG tablet  Commonly known as: Zovirax  Take 1 tablet by mouth 2 times daily     atorvastatin 20 MG tablet  Commonly known as: LIPITOR  Take 1 tablet by mouth every evening TAKE 1 TABLET DAILY     dicyclomine 10 MG capsule  Commonly known as: Bentyl  Take 1 capsule by mouth in the morning and 1 capsule at noon and 1 capsule in the evening and 1 capsule before bedtime. fluconazole 200 MG tablet  Commonly known as: Diflucan  Take 2 tablets by mouth daily     gabapentin 300 MG capsule  Commonly known as: NEURONTIN     levoFLOXacin 500 MG tablet  Commonly known as: LEVAQUIN  Take 1 tablet by mouth daily     lisinopril 20 MG tablet  Commonly known as: PRINIVIL;ZESTRIL  Take 1 tablet by mouth daily     OLANZapine 5 MG tablet  Commonly known as: ZyPREXA  Take 2 tablets by mouth nightly     ondansetron 8 MG tablet  Commonly known as: Zofran  Take 1 tablet by mouth every 8 (eight) hours for 10 days     pantoprazole 40 MG tablet  Commonly known as: PROTONIX  Take 1 tablet by mouth in the morning and 1 tablet in the evening. prochlorperazine 10 MG tablet  Commonly known as: COMPAZINE  Take 1 tablet by mouth in the morning and 1 tablet at noon and 1 tablet in the evening and 1 tablet before bedtime.      Vitamin C 500 MG Caps     VITAMIN D PO             ROS:  As noted above, otherwise remainder of 10-point ROS negative    Physical Exam:     I&O:    Intake/Output Summary (Last 24 hours) at 9/9/2022 1008  Last data filed at 9/8/2022 1015  Gross per 24 hour   Intake 1000 ml   Output --   Net 1000 ml         Vital Signs:  BP 98/67   Pulse 90   Temp 99.3 °F (37.4 °C) (Oral)   Resp 16   Wt 135 lb 12.9 oz (61.6 kg)   SpO2 98%   BMI 21.92 kg/m²     Weight:    Wt Readings from Last 3 Encounters:   09/09/22 135 lb 12.9 oz (61.6 kg)   09/08/22 135 lb 12.9 oz (61.6 kg)   09/07/22 135 lb 12.9 oz (61.6 kg)       General: Awake, alert and oriented.   HEENT: normocephalic, alopecia, PERRL, no scleral erythema or icterus, Oral mucosa moist and intact, throat clear  NECK: supple without palpable adenopathy  BACK: Straight negative CVAT  SKIN: warm dry and intact without lesions rashes or masses  CHEST: CTA bilaterally without use of accessory muscles  CV: Normal S1 S2, RRR, no MRG  ABD: NT ND normoactive BS, no palpable masses or hepatosplenomegaly  EXTREMITIES: without edema, denies calf tenderness  NEURO: CN II - XII grossly intact  CATHETER: Right Subclavian Trifusion (IR: Dianne, 7/22/22): CDI      Data:   CBC:   Recent Labs     09/07/22  0846 09/08/22  0910   WBC 0.1* 0.1*   HGB 12.1* 11.8*   HCT 35.4* 34.0*   MCV 92.4 91.1   PLT 15* 9*       BMP/Mag:  Recent Labs     09/07/22  0846 09/08/22  0910    138   K 4.0 3.7    101   CO2 28 27   PHOS 3.7 3.2   BUN 9 6*   CREATININE 0.8* 0.8*   MG 1.70*  --        LIVP:   Recent Labs     09/07/22  0846   AST 12*   ALT 7*   BILIDIR <0.2   BILITOT 0.6   ALKPHOS 78       Uric Acid:    Recent Labs     09/07/22  0846   LABURIC 2.0*       Coags:   Recent Labs     09/08/22  0910   PROTIME 14.9*   INR 1.17*         PROBLEM LIST:            Multiple Myeloma  HTN  HLD  Positive H. pylori breath test  Emend hypersensitivity rxn (8/2022)  CINV      TREATMENT:            RVd on (4/11/22) x 4 cycles   [id number] & ASCT (8/30/22) w/ 4.84 x 10^6 CD34+ cells/kg ASSESSMENT AND PLAN:            1. IgG lambda myeloma, ISS stage II: VGPR  - Bone marrow biopsy (3/10/2022): showed 70% cellularity with plasma cells estimated to account for between 30-50%. 46,XY[20]. Myeloma ROBIN panel showed three copies of CCND1 (11q13. 3) in 83% of cells, four copies of CCND1 (11q13. 3) in 5% of cells, three copies of MAF (16q23.2) in 54% of cells, and monosomy 13 in 9% of cells. - PET/CT (3/9/22) - showed no evidence of hypermetabolism. - [id number] & ASCT (8/30/22) w/ 4.84 x 10^6 CD34+ cells/kg     Emend Hypersensitivity 8/29/22 including facial flushing, stomach cramping. D/C'ed and started PO Zyprexa 10 mg once 8/29/22 then 5 mg PO daily for 3 more days, extended an additional 3 days. Day + 10     2. ID: Afebrile, no evidence of infection.    - Cont Levaquin, Acyclovir & Diflucan ppx     3. Heme: Anemia & thrombocytopenia d/t recent chemotherapy    - Transfuse for Hgb < 7 and Platelets < 21A  - Plt transfusion today  - Cont daily G-CSF Day + 5 (9/4/22)     4. Metabolic: Mild Hyperglycemia w/ stable renal fxn stable. - Cont IVF hydration: 1 L NS each day in OP Infusion  - Replace potassium and magnesium per policy. 5. Nutrition / GI:    - Hx positive H. pylori breath   - EGD & Colonoscopy Bx (8/18/22) - negative   Nutrition:  Appetite and oral intake is okay  - Cont low microbial diet   - Follow with dietary  GI: Persistent nausea   - Cont PPI BID   - Cont Zofran q8hrs (scheduled 8/31/22) and Compazine q6hrs (prn). If nausea continues to improve will start to back off on the Zofran 9/6/22  - Cont Zyprexa 5 mg nightly; increase to 10 mg nightly (9/8/22)    - may need Ativan for nausea as anxiety may be contributing   - Give IV Fluconazole and IV Acyclovir (morning dose) in OP Infusion each morning unless nausea subsides (started 9/6/22)     6. Pulmonary: No active issues   - DLCO of 75% of predicted finding on ASCT workup: No obvious etiology for this  - CXR (8/29/22):  Negative for acute disease     7. Prevention of SRE:    - PET/CT (3/9/22) - without bony disease.    - Hold off on a bisphosphonate. 8. Peripheral Neuropathy:   - Cont gabapentin 300 mg nighty (Consider increasing dose if symptoms persist)     9. Cardiac:    - H/o HTN  HTN:  stable   - S/p Lisinopril 20 mg daily (stopped 9/3/22)       - Disposition: Return to OP Infusion daily for toxicity assessment, labs and MD visit. BRINDA Avalos - CNP     Palma Tanner.  [facility], [address] [place] [address]

## 2022-09-09 NOTE — PROGRESS NOTES
Short Stay Communication Note  Rhianna Low  Diagnosis: Multiple Myeloma  Primary MD: Dr. Delvin Billy  Treatment: Melphalan Fludarabine/Cytoxan  Day +10 of Auto Transplant   Pt seen in outpatient infusion today. Labs drawn and reviewed. CBC:   Recent Labs     09/07/22  0846 09/08/22  0910 09/09/22  0830   WBC 0.1* 0.1* 0.3*   HGB 12.1* 11.8* 11.5*   HCT 35.4* 34.0* 31.8*   MCV 92.4 91.1 90.3   PLT 15* 9* 31*       BMP/Mag:  Recent Labs     09/07/22  0846 09/08/22  0910 09/09/22  0830    138 136   K 4.0 3.7 3.6    101 101   CO2 28 27 28   PHOS 3.7 3.2 3.2   BUN 9 6* 5*   CREATININE 0.8* 0.8* 0.9   MG 1.70*  --  1.40*       Standing parameters for replacement for this patient:   1 unit of pack red blood cells for a hemoglobin < or equal to 7.0  1 pack of platelets for a platelet count less than or equal to 10.0  40 MeQ of Potassium administered for a potassium level less than or equal to 3.4  4g of Magnesium Sulfate for a magnesum level less than or equal to 1.4  2g Magnesium sulfate administered per orders for above lab values, 2g to be administered tomorrow due to time constraints per NP. Urinalysis last done: 9/5/2022 Urinalysis next due: 9/12/2022    Chest X-Ray last done: 9/5/2022 Chest X-Ray next due: 9/12/2022    Symptoms addressed and reported to care team this date:   Treatments this date: IV Fluids, Acyclovir 300mg IVPB, Fluconazole 400mg IVPB, granix injection, magnesium sulfate    Reviewed medication schedule with pt and caregiver. Both able to verbalize all medications and schedule. Pt to be seen again tomorrow. Patient and caregiver verbalized understanding of discharge instructions including when and how to call the doctor and when to report  to the ER. Discharged ambulatory to home. CVC line care completed, heparin locked.      Electronically signed by Armin Peterson RN on 9/9/2022 at 1:09 PM

## 2022-09-10 ENCOUNTER — HOSPITAL ENCOUNTER (OUTPATIENT)
Dept: ONCOLOGY | Age: 47
Setting detail: INFUSION SERIES
Discharge: HOME | End: 2022-09-10
Payer: COMMERCIAL

## 2022-09-10 VITALS
WEIGHT: 136.02 LBS | RESPIRATION RATE: 16 BRPM | TEMPERATURE: 98.8 F | DIASTOLIC BLOOD PRESSURE: 65 MMHG | SYSTOLIC BLOOD PRESSURE: 100 MMHG | BODY MASS INDEX: 21.95 KG/M2 | HEART RATE: 95 BPM | OXYGEN SATURATION: 97 %

## 2022-09-10 DIAGNOSIS — C90.00 MULTIPLE MYELOMA NOT HAVING ACHIEVED REMISSION (HCC): Primary | ICD-10-CM

## 2022-09-10 LAB
ANION GAP SERPL CALCULATED.3IONS-SCNC: 10 MMOL/L (ref 3–16)
BANDED NEUTROPHILS RELATIVE PERCENT: 7 % (ref 0–7)
BASOPHILS ABSOLUTE: 0 K/UL (ref 0–0.2)
BASOPHILS RELATIVE PERCENT: 0 %
BUN BLDV-MCNC: 5 MG/DL (ref 7–20)
CALCIUM SERPL-MCNC: 8.6 MG/DL (ref 8.3–10.6)
CHLORIDE BLD-SCNC: 104 MMOL/L (ref 99–110)
CO2: 26 MMOL/L (ref 21–32)
CREAT SERPL-MCNC: 0.8 MG/DL (ref 0.9–1.3)
EOSINOPHILS ABSOLUTE: 0 K/UL (ref 0–0.6)
EOSINOPHILS RELATIVE PERCENT: 0 %
GFR AFRICAN AMERICAN: >60
GFR NON-AFRICAN AMERICAN: >60
GLUCOSE BLD-MCNC: 164 MG/DL (ref 70–99)
HCT VFR BLD CALC: 32.6 % (ref 40.5–52.5)
HEMOGLOBIN: 11.4 G/DL (ref 13.5–17.5)
LYMPHOCYTES ABSOLUTE: 0.2 K/UL (ref 1–5.1)
LYMPHOCYTES RELATIVE PERCENT: 16 %
MCH RBC QN AUTO: 31.9 PG (ref 26–34)
MCHC RBC AUTO-ENTMCNC: 35.1 G/DL (ref 31–36)
MCV RBC AUTO: 90.8 FL (ref 80–100)
MONOCYTES ABSOLUTE: 0.6 K/UL (ref 0–1.3)
MONOCYTES RELATIVE PERCENT: 47 %
MYELOCYTE PERCENT: 1 %
NEUTROPHILS ABSOLUTE: 0.4 K/UL (ref 1.7–7.7)
NEUTROPHILS RELATIVE PERCENT: 29 %
PDW BLD-RTO: 12.2 % (ref 12.4–15.4)
PHOSPHORUS: 2.9 MG/DL (ref 2.5–4.9)
PLATELET # BLD: 30 K/UL (ref 135–450)
PMV BLD AUTO: 8.7 FL (ref 5–10.5)
POTASSIUM SERPL-SCNC: 3.6 MMOL/L (ref 3.5–5.1)
RBC # BLD: 3.59 M/UL (ref 4.2–5.9)
SODIUM BLD-SCNC: 140 MMOL/L (ref 136–145)
WBC # BLD: 1.2 K/UL (ref 4–11)

## 2022-09-10 PROCEDURE — 96367 TX/PROPH/DG ADDL SEQ IV INF: CPT

## 2022-09-10 PROCEDURE — 36592 COLLECT BLOOD FROM PICC: CPT

## 2022-09-10 PROCEDURE — 96366 THER/PROPH/DIAG IV INF ADDON: CPT

## 2022-09-10 PROCEDURE — 96365 THER/PROPH/DIAG IV INF INIT: CPT

## 2022-09-10 PROCEDURE — 6360000002 HC RX W HCPCS: Performed by: NURSE PRACTITIONER

## 2022-09-10 PROCEDURE — 2580000003 HC RX 258: Performed by: NURSE PRACTITIONER

## 2022-09-10 PROCEDURE — 84100 ASSAY OF PHOSPHORUS: CPT

## 2022-09-10 PROCEDURE — 85025 COMPLETE CBC W/AUTO DIFF WBC: CPT

## 2022-09-10 PROCEDURE — 80048 BASIC METABOLIC PNL TOTAL CA: CPT

## 2022-09-10 PROCEDURE — 96361 HYDRATE IV INFUSION ADD-ON: CPT

## 2022-09-10 PROCEDURE — 96360 HYDRATION IV INFUSION INIT: CPT

## 2022-09-10 PROCEDURE — 2580000003 HC RX 258: Performed by: INTERNAL MEDICINE

## 2022-09-10 PROCEDURE — 6360000002 HC RX W HCPCS: Performed by: INTERNAL MEDICINE

## 2022-09-10 PROCEDURE — 96372 THER/PROPH/DIAG INJ SC/IM: CPT

## 2022-09-10 RX ORDER — PROCHLORPERAZINE MALEATE 10 MG
10 TABLET ORAL EVERY 6 HOURS PRN
Status: DISCONTINUED | OUTPATIENT
Start: 2022-09-10 | End: 2022-09-11 | Stop reason: HOSPADM

## 2022-09-10 RX ORDER — 0.9 % SODIUM CHLORIDE 0.9 %
1000 INTRAVENOUS SOLUTION INTRAVENOUS ONCE
Status: COMPLETED | OUTPATIENT
Start: 2022-09-10 | End: 2022-09-10

## 2022-09-10 RX ORDER — POTASSIUM CHLORIDE 20 MEQ/1
40 TABLET, EXTENDED RELEASE ORAL PRN
Status: CANCELLED | OUTPATIENT
Start: 2022-09-11

## 2022-09-10 RX ORDER — OXYCODONE HYDROCHLORIDE 5 MG/1
10 TABLET ORAL EVERY 4 HOURS PRN
Status: DISCONTINUED | OUTPATIENT
Start: 2022-09-10 | End: 2022-09-11 | Stop reason: HOSPADM

## 2022-09-10 RX ORDER — MAGNESIUM SULFATE IN WATER 40 MG/ML
4000 INJECTION, SOLUTION INTRAVENOUS PRN
Status: DISCONTINUED | OUTPATIENT
Start: 2022-09-10 | End: 2022-09-11 | Stop reason: HOSPADM

## 2022-09-10 RX ORDER — HEPARIN SODIUM (PORCINE) LOCK FLUSH IV SOLN 100 UNIT/ML 100 UNIT/ML
500 SOLUTION INTRAVENOUS PRN
Status: DISCONTINUED | OUTPATIENT
Start: 2022-09-10 | End: 2022-09-11 | Stop reason: HOSPADM

## 2022-09-10 RX ORDER — PROCHLORPERAZINE MALEATE 10 MG
10 TABLET ORAL EVERY 6 HOURS PRN
Status: CANCELLED | OUTPATIENT
Start: 2022-09-11

## 2022-09-10 RX ORDER — ONDANSETRON 4 MG/1
8 TABLET, FILM COATED ORAL EVERY 8 HOURS PRN
Status: CANCELLED | OUTPATIENT
Start: 2022-09-11

## 2022-09-10 RX ORDER — MAGNESIUM SULFATE IN WATER 40 MG/ML
2000 INJECTION, SOLUTION INTRAVENOUS ONCE
Status: COMPLETED | OUTPATIENT
Start: 2022-09-10 | End: 2022-09-10

## 2022-09-10 RX ORDER — POTASSIUM CHLORIDE 20 MEQ/1
40 TABLET, EXTENDED RELEASE ORAL PRN
Status: DISCONTINUED | OUTPATIENT
Start: 2022-09-10 | End: 2022-09-11 | Stop reason: HOSPADM

## 2022-09-10 RX ORDER — DIMETHICONE, CAMPHOR (SYNTHETIC), MENTHOL, AND PHENOL 1.1; .5; .625; .5 G/100G; G/100G; G/100G; G/100G
OINTMENT TOPICAL PRN
Status: DISCONTINUED | OUTPATIENT
Start: 2022-09-10 | End: 2022-09-11 | Stop reason: HOSPADM

## 2022-09-10 RX ORDER — POTASSIUM CHLORIDE 29.8 MG/ML
20 INJECTION INTRAVENOUS PRN
Status: DISCONTINUED | OUTPATIENT
Start: 2022-09-10 | End: 2022-09-11 | Stop reason: HOSPADM

## 2022-09-10 RX ORDER — FLUCONAZOLE 2 MG/ML
200 INJECTION, SOLUTION INTRAVENOUS
Status: COMPLETED | OUTPATIENT
Start: 2022-09-10 | End: 2022-09-10

## 2022-09-10 RX ORDER — HEPARIN SODIUM (PORCINE) LOCK FLUSH IV SOLN 100 UNIT/ML 100 UNIT/ML
500 SOLUTION INTRAVENOUS PRN
Status: CANCELLED | OUTPATIENT
Start: 2022-09-11

## 2022-09-10 RX ORDER — OXYCODONE HYDROCHLORIDE 5 MG/1
5 TABLET ORAL EVERY 4 HOURS PRN
Status: DISCONTINUED | OUTPATIENT
Start: 2022-09-10 | End: 2022-09-11 | Stop reason: HOSPADM

## 2022-09-10 RX ORDER — SODIUM CHLORIDE 9 MG/ML
INJECTION, SOLUTION INTRAVENOUS CONTINUOUS PRN
Status: CANCELLED | OUTPATIENT
Start: 2022-09-11

## 2022-09-10 RX ORDER — PETROLATUM, MENTHOL, UNSPECIFIED FORM, CAMPHOR (SYNTHETIC), AND PHENOL 59.14; 1; 1; .6 G/100G; G/100G; G/100G; G/100G
PASTE TOPICAL PRN
Status: CANCELLED | OUTPATIENT
Start: 2022-09-11

## 2022-09-10 RX ORDER — OXYCODONE HYDROCHLORIDE 5 MG/1
5 TABLET ORAL EVERY 4 HOURS PRN
Status: CANCELLED | OUTPATIENT
Start: 2022-09-11

## 2022-09-10 RX ORDER — POTASSIUM CHLORIDE 29.8 MG/ML
80 INJECTION INTRAVENOUS PRN
Status: CANCELLED | OUTPATIENT
Start: 2022-09-11

## 2022-09-10 RX ORDER — PROCHLORPERAZINE EDISYLATE 5 MG/ML
10 INJECTION INTRAMUSCULAR; INTRAVENOUS EVERY 6 HOURS PRN
Status: CANCELLED | OUTPATIENT
Start: 2022-09-11

## 2022-09-10 RX ORDER — ONDANSETRON 2 MG/ML
8 INJECTION INTRAMUSCULAR; INTRAVENOUS EVERY 8 HOURS PRN
Status: CANCELLED | OUTPATIENT
Start: 2022-09-11

## 2022-09-10 RX ORDER — ONDANSETRON 4 MG/1
8 TABLET, FILM COATED ORAL EVERY 8 HOURS PRN
Status: DISCONTINUED | OUTPATIENT
Start: 2022-09-10 | End: 2022-09-11 | Stop reason: HOSPADM

## 2022-09-10 RX ORDER — SODIUM CHLORIDE 9 MG/ML
INJECTION, SOLUTION INTRAVENOUS CONTINUOUS PRN
Status: DISCONTINUED | OUTPATIENT
Start: 2022-09-10 | End: 2022-09-11 | Stop reason: HOSPADM

## 2022-09-10 RX ORDER — MAGNESIUM SULFATE IN WATER 40 MG/ML
4000 INJECTION, SOLUTION INTRAVENOUS PRN
Status: CANCELLED | OUTPATIENT
Start: 2022-09-11

## 2022-09-10 RX ORDER — ONDANSETRON 2 MG/ML
8 INJECTION INTRAMUSCULAR; INTRAVENOUS EVERY 8 HOURS PRN
Status: DISCONTINUED | OUTPATIENT
Start: 2022-09-10 | End: 2022-09-11 | Stop reason: HOSPADM

## 2022-09-10 RX ORDER — 0.9 % SODIUM CHLORIDE 0.9 %
1000 INTRAVENOUS SOLUTION INTRAVENOUS ONCE
Status: CANCELLED | OUTPATIENT
Start: 2022-09-11 | End: 2022-09-11

## 2022-09-10 RX ORDER — PROCHLORPERAZINE EDISYLATE 5 MG/ML
10 INJECTION INTRAMUSCULAR; INTRAVENOUS EVERY 6 HOURS PRN
Status: DISCONTINUED | OUTPATIENT
Start: 2022-09-10 | End: 2022-09-11 | Stop reason: HOSPADM

## 2022-09-10 RX ORDER — OXYCODONE HYDROCHLORIDE 5 MG/1
10 TABLET ORAL EVERY 4 HOURS PRN
Status: CANCELLED | OUTPATIENT
Start: 2022-09-11

## 2022-09-10 RX ADMIN — ACYCLOVIR SODIUM 300 MG: 500 INJECTION, SOLUTION INTRAVENOUS at 10:10

## 2022-09-10 RX ADMIN — FILGRASTIM-AAFI 300 MCG: 480 INJECTION, SOLUTION SUBCUTANEOUS at 09:10

## 2022-09-10 RX ADMIN — SODIUM CHLORIDE, PRESERVATIVE FREE 500 UNITS: 5 INJECTION INTRAVENOUS at 11:40

## 2022-09-10 RX ADMIN — MAGNESIUM SULFATE IN WATER 2000 MG: 40 INJECTION, SOLUTION INTRAVENOUS at 09:07

## 2022-09-10 RX ADMIN — FLUCONAZOLE 200 MG: 2 INJECTION, SOLUTION INTRAVENOUS at 09:09

## 2022-09-10 RX ADMIN — SODIUM CHLORIDE 1000 ML: 9 INJECTION, SOLUTION INTRAVENOUS at 08:39

## 2022-09-10 RX ADMIN — FLUCONAZOLE 200 MG: 2 INJECTION, SOLUTION INTRAVENOUS at 10:16

## 2022-09-10 NOTE — PROGRESS NOTES
Short Stay Communication Note  Hailey Blake  Diagnosis: Multiple Myeloma  Primary MD: Dr. Karen Zohu  Treatment: Melphalan Fludarabine/Cytoxan  Day +11 of Auto Transplant   Pt seen in outpatient infusion today. Labs drawn and reviewed. CBC:   Recent Labs     09/08/22  0910 09/09/22  0830 09/10/22  0840   WBC 0.1* 0.3* 1.2*   HGB 11.8* 11.5* 11.4*   HCT 34.0* 31.8* 32.6*   MCV 91.1 90.3 90.8   PLT 9* 31* 30*       BMP/Mag:  Recent Labs     09/08/22 0910 09/09/22  0830 09/10/22  0840    136 140   K 3.7 3.6 3.6    101 104   CO2 27 28 26   PHOS 3.2 3.2 2.9   BUN 6* 5* 5*   CREATININE 0.8* 0.9 0.8*   MG  --  1.40*  --        Standing parameters for replacement for this patient:   1 unit of pack red blood cells for a hemoglobin < or equal to 7.0  1 pack of platelets for a platelet count less than or equal to 10.0  40 MeQ of Potassium administered for a potassium level less than or equal to 3.4  4g of Magnesium Sulfate for a magnesum level less than or equal to 1.4  2g Magnesium sulfate administered per orders for above lab values from yesterday. Urinalysis last done: 9/5/2022 Urinalysis next due: 9/12/2022    Chest X-Ray last done: 9/5/2022 Chest X-Ray next due: 9/12/2022    Symptoms addressed and reported to care team this date:   Treatments this date: IV Fluids, Acyclovir 300mg IVPB, Fluconazole 400mg IVPB, granix injection, magnesium sulfate    Reviewed medication schedule with pt and caregiver. Both able to verbalize all medications and schedule. Pt to be seen again tomorrow. Patient and caregiver verbalized understanding of discharge instructions including when and how to call the doctor and when to report  to the ER. Discharged ambulatory to home. CVC line care completed, heparin locked.      Electronically signed by Gregory Alvares RN on 9/10/2022 at 9:33 AM

## 2022-09-10 NOTE — PROGRESS NOTES
[facility]  Aut[facility] Progress Note    9/10/2022    Jaime Delgadillo    :  1975    MRN:  [id number]    Referring MD: Ras Hobbs MD  [address] [facility],  [address]      Subjective:  doing well, nausea is better today. Taking zyprexa at night     ECOG PS:  (1) Restricted in physically strenuous activity, ambulatory and able to do work of light nature    KPS: 80% Normal activity with effort; some signs or symptoms of disease    Medications       Medication List        ASK your doctor about these medications      acyclovir 800 MG tablet  Commonly known as: Zovirax  Take 1 tablet by mouth 2 times daily     atorvastatin 20 MG tablet  Commonly known as: LIPITOR  Take 1 tablet by mouth every evening TAKE 1 TABLET DAILY     clotrimazole 10 MG aleksander  Commonly known as: MYCELEX  Take 1 tablet by mouth 4 times daily for 7 days     dicyclomine 10 MG capsule  Commonly known as: Bentyl  Take 1 capsule by mouth in the morning and 1 capsule at noon and 1 capsule in the evening and 1 capsule before bedtime. fluconazole 200 MG tablet  Commonly known as: Diflucan  Take 2 tablets by mouth daily     gabapentin 300 MG capsule  Commonly known as: NEURONTIN     levoFLOXacin 500 MG tablet  Commonly known as: LEVAQUIN  Take 1 tablet by mouth daily     lisinopril 20 MG tablet  Commonly known as: PRINIVIL;ZESTRIL  Take 1 tablet by mouth daily     OLANZapine 5 MG tablet  Commonly known as: ZyPREXA  Take 2 tablets by mouth nightly     ondansetron 8 MG tablet  Commonly known as: Zofran  Take 1 tablet by mouth every 8 (eight) hours for 10 days     pantoprazole 40 MG tablet  Commonly known as: PROTONIX  Take 1 tablet by mouth in the morning and 1 tablet in the evening. prochlorperazine 10 MG tablet  Commonly known as: COMPAZINE  Take 1 tablet by mouth in the morning and 1 tablet at noon and 1 tablet in the evening and 1 tablet before bedtime.      Vitamin C 500 MG Caps     VITAMIN D PO ROS:  As noted above, otherwise remainder of 10-point ROS negative    Physical Exam:     I&O:  No intake or output data in the 24 hours ending 09/10/22 1036        Vital Signs:  /65   Pulse 95   Temp 98.8 °F (37.1 °C) (Oral)   Resp 16   Wt 136 lb 0.4 oz (61.7 kg)   SpO2 97%   BMI 21.95 kg/m²     Weight:    Wt Readings from Last 3 Encounters:   09/10/22 136 lb 0.4 oz (61.7 kg)   09/09/22 135 lb 12.9 oz (61.6 kg)   09/08/22 135 lb 12.9 oz (61.6 kg)       General: Awake, alert and oriented.   HEENT: normocephalic, alopecia, PERRL, no scleral erythema or icterus, Oral mucosa moist and intact, throat clear  NECK: supple without palpable adenopathy  BACK: Straight negative CVAT  SKIN: warm dry and intact without lesions rashes or masses  CHEST: CTA bilaterally without use of accessory muscles  CV: Normal S1 S2, RRR, no MRG  ABD: NT ND normoactive BS, no palpable masses or hepatosplenomegaly  EXTREMITIES: without edema, denies calf tenderness  NEURO: CN II - XII grossly intact  CATHETER: Right Subclavian Trifusion (IR: Dianne, 7/22/22): CDI      Data:   CBC:   Recent Labs     09/08/22  0910 09/09/22  0830 09/10/22  0840   WBC 0.1* 0.3* 1.2*   HGB 11.8* 11.5* 11.4*   HCT 34.0* 31.8* 32.6*   MCV 91.1 90.3 90.8   PLT 9* 31* 30*       BMP/Mag:  Recent Labs     09/08/22  0910 09/09/22  0830 09/10/22  0840    136 140   K 3.7 3.6 3.6    101 104   CO2 27 28 26   PHOS 3.2 3.2 2.9   BUN 6* 5* 5*   CREATININE 0.8* 0.9 0.8*   MG  --  1.40*  --        LIVP:   Recent Labs     09/09/22  0830   AST 12*   ALT 10   BILIDIR <0.2   BILITOT 0.3   ALKPHOS 84       Uric Acid:    Recent Labs     09/09/22  0830   LABURIC 1.7*       Coags:   Recent Labs     09/08/22  0910   PROTIME 14.9*   INR 1.17*         PROBLEM LIST:            Multiple Myeloma  HTN  HLD  Positive H. pylori breath test  Emend hypersensitivity rxn (8/2022)  CINV      TREATMENT:            RVd on (4/11/22) x 4 cycles   [id number] & ASCT (8/30/22) w/ 4.84 x 10^6 CD34+ cells/kg     ASSESSMENT AND PLAN:            1. IgG lambda myeloma, ISS stage II: VGPR  - Bone marrow biopsy (3/10/2022): showed 70% cellularity with plasma cells estimated to account for between 30-50%. 46,XY[20]. Myeloma ROBIN panel showed three copies of CCND1 (11q13. 3) in 83% of cells, four copies of CCND1 (11q13. 3) in 5% of cells, three copies of MAF (16q23.2) in 54% of cells, and monosomy 13 in 9% of cells. - PET/CT (3/9/22) - showed no evidence of hypermetabolism. - Pyr720 & ASCT (8/30/22) w/ 4.84 x 10^6 CD34+ cells/kg     Emend Hypersensitivity 8/29/22 including facial flushing, stomach cramping. D/C'ed and started PO Zyprexa 10 mg once 8/29/22 then 5 mg PO daily for 3 more days, extended an additional 3 days. Day + 11     2. ID: Afebrile, no evidence of infection.    - Cont Levaquin, Acyclovir & Diflucan ppx     3. Heme: Pancytopenia (WBC starting to recover)   - Transfuse for Hgb < 7 and Platelets < 57K  - No transfusion today  - Cont daily G-CSF Day + 5 (9/4/22)     4. Metabolic: Mild Hyperglycemia w/ stable renal fxn stable. - Cont IVF hydration: 1 L NS each day in OP Infusion  - Replace potassium and magnesium per policy. 5. Nutrition / GI:    - Hx positive H. pylori breath   - EGD & Colonoscopy Bx (8/18/22) - negative   Nutrition:  Appetite and oral intake is okay  - Cont low microbial diet   - Follow with dietary  GI: Persistent nausea   - Cont PPI BID   - Cont Zofran q8hrs (scheduled 8/31/22) and Compazine q6hrs (prn). If nausea continues to improve will start to back off on the Zofran 9/6/22  - Cont Zyprexa 5 mg nightly; increase to 10 mg nightly (9/8/22)    - may need Ativan for nausea as anxiety may be contributing   - Give IV Fluconazole and IV Acyclovir (morning dose) in OP Infusion each morning unless nausea subsides (started 9/6/22)     6.  Pulmonary: No active issues   - DLCO of 75% of predicted finding on ASCT workup: No obvious etiology for this  - CXR (8/29/22): Negative for acute disease     7. Prevention of SRE:    - PET/CT (3/9/22) - without bony disease.    - Hold off on a bisphosphonate. 8. Peripheral Neuropathy:   - Cont gabapentin 300 mg nighty (Consider increasing dose if symptoms persist)     9. Cardiac:    - H/o HTN  HTN:  stable   - S/p Lisinopril 20 mg daily (stopped 9/3/22)       - Disposition: Return to OP Infusion daily for toxicity assessment, labs and MD visit.       BRINDA Flores - NP    Ceclia Hodgkins, DO

## 2022-09-11 ENCOUNTER — HOSPITAL ENCOUNTER (OUTPATIENT)
Dept: ONCOLOGY | Age: 47
Setting detail: INFUSION SERIES
Discharge: HOME | End: 2022-09-11
Payer: COMMERCIAL

## 2022-09-11 VITALS
TEMPERATURE: 98.5 F | BODY MASS INDEX: 22.06 KG/M2 | OXYGEN SATURATION: 98 % | WEIGHT: 136.69 LBS | SYSTOLIC BLOOD PRESSURE: 95 MMHG | RESPIRATION RATE: 16 BRPM | DIASTOLIC BLOOD PRESSURE: 63 MMHG | HEART RATE: 93 BPM

## 2022-09-11 DIAGNOSIS — C90.00 MULTIPLE MYELOMA NOT HAVING ACHIEVED REMISSION (HCC): Primary | ICD-10-CM

## 2022-09-11 LAB
ANION GAP SERPL CALCULATED.3IONS-SCNC: 9 MMOL/L (ref 3–16)
BANDED NEUTROPHILS RELATIVE PERCENT: 11 % (ref 0–7)
BASOPHILS ABSOLUTE: 0 K/UL (ref 0–0.2)
BASOPHILS RELATIVE PERCENT: 0 %
BUN BLDV-MCNC: 4 MG/DL (ref 7–20)
CALCIUM SERPL-MCNC: 8.7 MG/DL (ref 8.3–10.6)
CHLORIDE BLD-SCNC: 105 MMOL/L (ref 99–110)
CO2: 26 MMOL/L (ref 21–32)
CREAT SERPL-MCNC: 0.7 MG/DL (ref 0.9–1.3)
EOSINOPHILS ABSOLUTE: 0 K/UL (ref 0–0.6)
EOSINOPHILS RELATIVE PERCENT: 0 %
GFR AFRICAN AMERICAN: >60
GFR NON-AFRICAN AMERICAN: >60
GLUCOSE BLD-MCNC: 167 MG/DL (ref 70–99)
HCT VFR BLD CALC: 32.5 % (ref 40.5–52.5)
HEMOGLOBIN: 11.4 G/DL (ref 13.5–17.5)
LYMPHOCYTES ABSOLUTE: 0.3 K/UL (ref 1–5.1)
LYMPHOCYTES RELATIVE PERCENT: 9 %
MCH RBC QN AUTO: 31.9 PG (ref 26–34)
MCHC RBC AUTO-ENTMCNC: 35.2 G/DL (ref 31–36)
MCV RBC AUTO: 90.7 FL (ref 80–100)
METAMYELOCYTES RELATIVE PERCENT: 4 %
MONOCYTES ABSOLUTE: 1.2 K/UL (ref 0–1.3)
MONOCYTES RELATIVE PERCENT: 33 %
MYELOCYTE PERCENT: 3 %
NEUTROPHILS ABSOLUTE: 2.1 K/UL (ref 1.7–7.7)
NEUTROPHILS RELATIVE PERCENT: 40 %
PDW BLD-RTO: 12.1 % (ref 12.4–15.4)
PHOSPHORUS: 2.3 MG/DL (ref 2.5–4.9)
PLATELET # BLD: 37 K/UL (ref 135–450)
PMV BLD AUTO: 9.3 FL (ref 5–10.5)
POTASSIUM SERPL-SCNC: 3.4 MMOL/L (ref 3.5–5.1)
RBC # BLD: 3.58 M/UL (ref 4.2–5.9)
SODIUM BLD-SCNC: 140 MMOL/L (ref 136–145)
WBC # BLD: 3.6 K/UL (ref 4–11)

## 2022-09-11 PROCEDURE — 96365 THER/PROPH/DIAG IV INF INIT: CPT

## 2022-09-11 PROCEDURE — 80048 BASIC METABOLIC PNL TOTAL CA: CPT

## 2022-09-11 PROCEDURE — 6370000000 HC RX 637 (ALT 250 FOR IP): Performed by: INTERNAL MEDICINE

## 2022-09-11 PROCEDURE — 2580000003 HC RX 258: Performed by: INTERNAL MEDICINE

## 2022-09-11 PROCEDURE — 96367 TX/PROPH/DG ADDL SEQ IV INF: CPT

## 2022-09-11 PROCEDURE — 96360 HYDRATION IV INFUSION INIT: CPT

## 2022-09-11 PROCEDURE — 6360000002 HC RX W HCPCS: Performed by: INTERNAL MEDICINE

## 2022-09-11 PROCEDURE — 84100 ASSAY OF PHOSPHORUS: CPT

## 2022-09-11 PROCEDURE — 36592 COLLECT BLOOD FROM PICC: CPT

## 2022-09-11 PROCEDURE — 96361 HYDRATE IV INFUSION ADD-ON: CPT

## 2022-09-11 PROCEDURE — 2580000003 HC RX 258: Performed by: NURSE PRACTITIONER

## 2022-09-11 PROCEDURE — 96372 THER/PROPH/DIAG INJ SC/IM: CPT

## 2022-09-11 PROCEDURE — 96366 THER/PROPH/DIAG IV INF ADDON: CPT

## 2022-09-11 PROCEDURE — 6360000002 HC RX W HCPCS: Performed by: NURSE PRACTITIONER

## 2022-09-11 PROCEDURE — 85025 COMPLETE CBC W/AUTO DIFF WBC: CPT

## 2022-09-11 RX ORDER — HEPARIN SODIUM (PORCINE) LOCK FLUSH IV SOLN 100 UNIT/ML 100 UNIT/ML
500 SOLUTION INTRAVENOUS PRN
Status: DISCONTINUED | OUTPATIENT
Start: 2022-09-11 | End: 2022-09-12 | Stop reason: HOSPADM

## 2022-09-11 RX ORDER — 0.9 % SODIUM CHLORIDE 0.9 %
1000 INTRAVENOUS SOLUTION INTRAVENOUS ONCE
Status: COMPLETED | OUTPATIENT
Start: 2022-09-11 | End: 2022-09-11

## 2022-09-11 RX ORDER — MAGNESIUM SULFATE IN WATER 40 MG/ML
4000 INJECTION, SOLUTION INTRAVENOUS PRN
Status: DISCONTINUED | OUTPATIENT
Start: 2022-09-11 | End: 2022-09-12 | Stop reason: HOSPADM

## 2022-09-11 RX ORDER — OXYCODONE HYDROCHLORIDE 5 MG/1
10 TABLET ORAL EVERY 4 HOURS PRN
Status: CANCELLED | OUTPATIENT
Start: 2022-09-12

## 2022-09-11 RX ORDER — 0.9 % SODIUM CHLORIDE 0.9 %
1000 INTRAVENOUS SOLUTION INTRAVENOUS ONCE
Status: CANCELLED | OUTPATIENT
Start: 2022-09-12 | End: 2022-09-12

## 2022-09-11 RX ORDER — POTASSIUM CHLORIDE 29.8 MG/ML
20 INJECTION INTRAVENOUS PRN
Status: DISCONTINUED | OUTPATIENT
Start: 2022-09-11 | End: 2022-09-12 | Stop reason: HOSPADM

## 2022-09-11 RX ORDER — FLUCONAZOLE 2 MG/ML
400 INJECTION, SOLUTION INTRAVENOUS ONCE
Status: DISCONTINUED | OUTPATIENT
Start: 2022-09-11 | End: 2022-09-11 | Stop reason: CLARIF

## 2022-09-11 RX ORDER — ONDANSETRON 2 MG/ML
8 INJECTION INTRAMUSCULAR; INTRAVENOUS EVERY 8 HOURS PRN
Status: CANCELLED | OUTPATIENT
Start: 2022-09-12

## 2022-09-11 RX ORDER — DIMETHICONE, CAMPHOR (SYNTHETIC), MENTHOL, AND PHENOL 1.1; .5; .625; .5 G/100G; G/100G; G/100G; G/100G
OINTMENT TOPICAL PRN
Status: DISCONTINUED | OUTPATIENT
Start: 2022-09-11 | End: 2022-09-12 | Stop reason: HOSPADM

## 2022-09-11 RX ORDER — POTASSIUM CHLORIDE 29.8 MG/ML
80 INJECTION INTRAVENOUS PRN
Status: CANCELLED | OUTPATIENT
Start: 2022-09-12

## 2022-09-11 RX ORDER — POTASSIUM CHLORIDE 20 MEQ/1
20 TABLET, EXTENDED RELEASE ORAL 2 TIMES DAILY
Qty: 180 TABLET | Refills: 1 | Status: SHIPPED | OUTPATIENT
Start: 2022-09-11

## 2022-09-11 RX ORDER — MAGNESIUM SULFATE IN WATER 40 MG/ML
4000 INJECTION, SOLUTION INTRAVENOUS PRN
Status: CANCELLED | OUTPATIENT
Start: 2022-09-12

## 2022-09-11 RX ORDER — PROCHLORPERAZINE MALEATE 10 MG
10 TABLET ORAL EVERY 6 HOURS PRN
Status: DISCONTINUED | OUTPATIENT
Start: 2022-09-11 | End: 2022-09-12 | Stop reason: HOSPADM

## 2022-09-11 RX ORDER — PROCHLORPERAZINE MALEATE 10 MG
10 TABLET ORAL EVERY 6 HOURS PRN
Status: CANCELLED | OUTPATIENT
Start: 2022-09-12

## 2022-09-11 RX ORDER — SODIUM CHLORIDE 9 MG/ML
INJECTION, SOLUTION INTRAVENOUS CONTINUOUS PRN
Status: DISCONTINUED | OUTPATIENT
Start: 2022-09-11 | End: 2022-09-12 | Stop reason: HOSPADM

## 2022-09-11 RX ORDER — ONDANSETRON 2 MG/ML
8 INJECTION INTRAMUSCULAR; INTRAVENOUS EVERY 8 HOURS PRN
Status: DISCONTINUED | OUTPATIENT
Start: 2022-09-11 | End: 2022-09-12 | Stop reason: HOSPADM

## 2022-09-11 RX ORDER — VALACYCLOVIR HYDROCHLORIDE 500 MG/1
500 TABLET, FILM COATED ORAL 2 TIMES DAILY
Qty: 60 TABLET | Refills: 2 | Status: SHIPPED | OUTPATIENT
Start: 2022-09-11

## 2022-09-11 RX ORDER — POTASSIUM CHLORIDE 20 MEQ/1
40 TABLET, EXTENDED RELEASE ORAL PRN
Status: CANCELLED | OUTPATIENT
Start: 2022-09-12

## 2022-09-11 RX ORDER — POTASSIUM CHLORIDE 20 MEQ/1
40 TABLET, EXTENDED RELEASE ORAL PRN
Status: DISCONTINUED | OUTPATIENT
Start: 2022-09-11 | End: 2022-09-12 | Stop reason: HOSPADM

## 2022-09-11 RX ORDER — ONDANSETRON 4 MG/1
8 TABLET, FILM COATED ORAL EVERY 8 HOURS PRN
Status: CANCELLED | OUTPATIENT
Start: 2022-09-12

## 2022-09-11 RX ORDER — SODIUM CHLORIDE 9 MG/ML
INJECTION, SOLUTION INTRAVENOUS CONTINUOUS PRN
Status: CANCELLED | OUTPATIENT
Start: 2022-09-12

## 2022-09-11 RX ORDER — OXYCODONE HYDROCHLORIDE 5 MG/1
5 TABLET ORAL EVERY 4 HOURS PRN
Status: CANCELLED | OUTPATIENT
Start: 2022-09-12

## 2022-09-11 RX ORDER — ONDANSETRON 4 MG/1
8 TABLET, FILM COATED ORAL EVERY 8 HOURS PRN
Status: DISCONTINUED | OUTPATIENT
Start: 2022-09-11 | End: 2022-09-12 | Stop reason: HOSPADM

## 2022-09-11 RX ORDER — HEPARIN SODIUM (PORCINE) LOCK FLUSH IV SOLN 100 UNIT/ML 100 UNIT/ML
500 SOLUTION INTRAVENOUS PRN
Status: CANCELLED | OUTPATIENT
Start: 2022-09-12

## 2022-09-11 RX ORDER — PROCHLORPERAZINE EDISYLATE 5 MG/ML
10 INJECTION INTRAMUSCULAR; INTRAVENOUS EVERY 6 HOURS PRN
Status: DISCONTINUED | OUTPATIENT
Start: 2022-09-11 | End: 2022-09-12 | Stop reason: HOSPADM

## 2022-09-11 RX ORDER — OXYCODONE HYDROCHLORIDE 5 MG/1
10 TABLET ORAL EVERY 4 HOURS PRN
Status: DISCONTINUED | OUTPATIENT
Start: 2022-09-11 | End: 2022-09-12 | Stop reason: HOSPADM

## 2022-09-11 RX ORDER — PETROLATUM, MENTHOL, UNSPECIFIED FORM, CAMPHOR (SYNTHETIC), AND PHENOL 59.14; 1; 1; .6 G/100G; G/100G; G/100G; G/100G
PASTE TOPICAL PRN
Status: CANCELLED | OUTPATIENT
Start: 2022-09-12

## 2022-09-11 RX ORDER — PROCHLORPERAZINE EDISYLATE 5 MG/ML
10 INJECTION INTRAMUSCULAR; INTRAVENOUS EVERY 6 HOURS PRN
Status: CANCELLED | OUTPATIENT
Start: 2022-09-12

## 2022-09-11 RX ORDER — OXYCODONE HYDROCHLORIDE 5 MG/1
5 TABLET ORAL EVERY 4 HOURS PRN
Status: DISCONTINUED | OUTPATIENT
Start: 2022-09-11 | End: 2022-09-12 | Stop reason: HOSPADM

## 2022-09-11 RX ADMIN — POTASSIUM CHLORIDE 40 MEQ: 20 TABLET, EXTENDED RELEASE ORAL at 09:54

## 2022-09-11 RX ADMIN — SODIUM CHLORIDE 1000 ML: 9 INJECTION, SOLUTION INTRAVENOUS at 08:31

## 2022-09-11 RX ADMIN — SODIUM CHLORIDE, PRESERVATIVE FREE 500 UNITS: 5 INJECTION INTRAVENOUS at 10:53

## 2022-09-11 RX ADMIN — FLUCONAZOLE 400 MG: 2 INJECTION, SOLUTION INTRAVENOUS at 08:37

## 2022-09-11 RX ADMIN — ACYCLOVIR SODIUM 300 MG: 500 INJECTION, SOLUTION INTRAVENOUS at 08:43

## 2022-09-11 RX ADMIN — FILGRASTIM-AAFI 300 MCG: 480 INJECTION, SOLUTION SUBCUTANEOUS at 08:28

## 2022-09-11 NOTE — PROGRESS NOTES
[address]  Autologous Progress Note    2022    Cheryle Shearer    :  1975    MRN:  [id number]    Referring MD: MD LITO Lucero [address] [facility],  [address] [place] [address]      Subjective:  *** doing well, nausea is better today. Taking zyprexa at night     ECOG PS:  (1) Restricted in physically strenuous activity, ambulatory and able to do work of light nature    KPS: 80% Normal activity with effort; some signs or symptoms of disease    Medications       Medication List        ASK your doctor about these medications      acyclovir 800 MG tablet  Commonly known as: Zovirax  Take 1 tablet by mouth 2 times daily     atorvastatin 20 MG tablet  Commonly known as: LIPITOR  Take 1 tablet by mouth every evening TAKE 1 TABLET DAILY     clotrimazole 10 MG aleksander  Commonly known as: MYCELEX  Take 1 tablet by mouth 4 times daily for 7 days     dicyclomine 10 MG capsule  Commonly known as: Bentyl  Take 1 capsule by mouth in the morning and 1 capsule at noon and 1 capsule in the evening and 1 capsule before bedtime. fluconazole 200 MG tablet  Commonly known as: Diflucan  Take 2 tablets by mouth daily     gabapentin 300 MG capsule  Commonly known as: NEURONTIN     levoFLOXacin 500 MG tablet  Commonly known as: LEVAQUIN  Take 1 tablet by mouth daily     lisinopril 20 MG tablet  Commonly known as: PRINIVIL;ZESTRIL  Take 1 tablet by mouth daily     OLANZapine 5 MG tablet  Commonly known as: ZyPREXA  Take 2 tablets by mouth nightly     ondansetron 8 MG tablet  Commonly known as: Zofran  Take 1 tablet by mouth every 8 (eight) hours for 10 days     pantoprazole 40 MG tablet  Commonly known as: PROTONIX  Take 1 tablet by mouth in the morning and 1 tablet in the evening. prochlorperazine 10 MG tablet  Commonly known as: COMPAZINE  Take 1 tablet by mouth in the morning and 1 tablet at noon and 1 tablet in the evening and 1 tablet before bedtime.      Vitamin C 500 MG Caps     VITAMIN D PO ROS:  As noted above, otherwise remainder of 10-point ROS negative    Physical Exam:     I&O:  No intake or output data in the 24 hours ending 09/11/22 0909        Vital Signs:  BP 95/63   Pulse 93   Temp 98.5 °F (36.9 °C) (Oral)   Resp 16   Wt 136 lb 11 oz (62 kg)   SpO2 98%   BMI 22.06 kg/m²     Weight:    Wt Readings from Last 3 Encounters:   09/11/22 136 lb 11 oz (62 kg)   09/10/22 136 lb 0.4 oz (61.7 kg)   09/09/22 135 lb 12.9 oz (61.6 kg)       General: Awake, alert and oriented.   HEENT: normocephalic, alopecia, PERRL, no scleral erythema or icterus, Oral mucosa moist and intact, throat clear  NECK: supple without palpable adenopathy  BACK: Straight negative CVAT  SKIN: warm dry and intact without lesions rashes or masses  CHEST: CTA bilaterally without use of accessory muscles  CV: Normal S1 S2, RRR, no MRG  ABD: NT ND normoactive BS, no palpable masses or hepatosplenomegaly  EXTREMITIES: without edema, denies calf tenderness  NEURO: CN II - XII grossly intact  CATHETER: Right Subclavian Trifusion (IR: Dianne, 7/22/22): CDI      Data:   CBC:   Recent Labs     09/09/22  0830 09/10/22  0840 09/11/22  0830   WBC 0.3* 1.2* 3.6*   HGB 11.5* 11.4* 11.4*   HCT 31.8* 32.6* 32.5*   MCV 90.3 90.8 90.7   PLT 31* 30* 37*       BMP/Mag:  Recent Labs     09/09/22  0830 09/10/22  0840 09/11/22  0830    140 140   K 3.6 3.6 3.4*    104 105   CO2 28 26 26   PHOS 3.2 2.9 2.3*   BUN 5* 5* 4*   CREATININE 0.9 0.8* 0.7*   MG 1.40*  --   --        LIVP:   Recent Labs     09/09/22  0830   AST 12*   ALT 10   BILIDIR <0.2   BILITOT 0.3   ALKPHOS 84       Uric Acid:    Recent Labs     09/09/22  0830   LABURIC 1.7*       Coags:   Recent Labs     09/08/22  0910   PROTIME 14.9*   INR 1.17*         PROBLEM LIST:            Multiple Myeloma  HTN  HLD  Positive H. pylori breath test  Emend hypersensitivity rxn (8/2022)  CINV      TREATMENT:            RVd on (4/11/22) x 4 cycles   [id number] & ASCT (8/30/22) w/ 4.84 x 10^6 CD34+ cells/kg     ASSESSMENT AND PLAN:            1. IgG lambda myeloma, ISS stage II: VGPR  - Bone marrow biopsy (3/10/2022): showed 70% cellularity with plasma cells estimated to account for between 30-50%. 46,XY[20]. Myeloma ROBIN panel showed three copies of CCND1 (11q13. 3) in 83% of cells, four copies of CCND1 (11q13. 3) in 5% of cells, three copies of MAF (16q23.2) in 54% of cells, and monosomy 13 in 9% of cells. - PET/CT (3/9/22) - showed no evidence of hypermetabolism. - Cpb035 & ASCT (8/30/22) w/ 4.84 x 10^6 CD34+ cells/kg     Emend Hypersensitivity 8/29/22 including facial flushing, stomach cramping. D/C'ed and started PO Zyprexa 10 mg once 8/29/22 then 5 mg PO daily for 3 more days, extended an additional 3 days. Day + 12     2. ID: Afebrile, no evidence of infection.    - Cont Acyclovir ppx  - Stop Levaquin & Diflucan ppx (9/11/22): Restart if ANC < 1.5     3. Heme: Pancytopenia (WBC starting to recover)   - Transfuse for Hgb < 7 and Platelets < 21X  - No transfusion today  - S/p daily G-CSF (9/4/22 - 9/1122)     4. Metabolic: Mild Hyperglycemia w/ stable renal fxn stable. - Cont IVF hydration: 1 L NS each day in OP Infusion  - Replace potassium and magnesium per policy. 5. Nutrition / GI:    - Hx positive H. pylori breath   - EGD & Colonoscopy Bx (8/18/22) - negative   Nutrition:  Appetite and oral intake is okay  - Cont low microbial diet   - Follow with dietary  GI: Persistent nausea   - Cont PPI BID   - Cont Zofran q8hrs (scheduled 8/31/22) and Compazine q6hrs (prn). If nausea continues to improve will start to back off on the Zofran 9/6/22  - Cont Zyprexa 5 mg nightly; increase to 10 mg nightly (9/8/22)    - may need Ativan for nausea as anxiety may be contributing   - S/p IV Fluconazole and IV Acyclovir (morning dose) in OP Infusion each morning unless nausea subsides (started 9/6/22 - 9/11/22)     6.  Pulmonary: No active issues   - DLCO of 75% of predicted finding on ASCT workup: No

## 2022-09-11 NOTE — PROGRESS NOTES
Short Stay Communication Note  Dayanna Ambrocio  Diagnosis: Multiple Myeloma  Primary MD: Dr. Maddie Marcial  Treatment: Melphalan Fludarabine/Cytoxan  Day +12 of Auto Transplant   Pt seen in outpatient infusion today. Labs drawn and reviewed. CBC:   Recent Labs     09/09/22 0830 09/10/22  0840 09/11/22  0830   WBC 0.3* 1.2* 3.6*   HGB 11.5* 11.4* 11.4*   HCT 31.8* 32.6* 32.5*   MCV 90.3 90.8 90.7   PLT 31* 30* 37*       BMP/Mag:  Recent Labs     09/09/22  0830 09/10/22  0840 09/11/22  0830    140 140   K 3.6 3.6 3.4*    104 105   CO2 28 26 26   PHOS 3.2 2.9 2.3*   BUN 5* 5* 4*   CREATININE 0.9 0.8* 0.7*   MG 1.40*  --   --        Standing parameters for replacement for this patient:   1 unit of pack red blood cells for a hemoglobin < or equal to 7.0  1 pack of platelets for a platelet count less than or equal to 10.0  40 MeQ of Potassium administered for a potassium level less than or equal to 3.4  4g of Magnesium Sulfate for a magnesum level less than or equal to 1.4  40MeQ K administered PO for above lab values. Urinalysis last done: 9/5/2022 Urinalysis next due: 9/12/2022    Chest X-Ray last done: 9/5/2022 Chest X-Ray next due: 9/12/2022    Symptoms addressed and reported to care team this date:   Treatments this date: IV Fluids, Acyclovir 300mg IVPB, Fluconazole 400mg IVPB, granix injection, 40 MeQ potassium PO    Reviewed medication schedule with pt and caregiver. Both able to verbalize all medications and schedule. Pt to be seen again tomorrow. Patient verbalized understanding of discharge instructions including when and how to call the doctor and when to report  to the ER. Discharged ambulatory to home. CVC line care completed, heparin locked.      Electronically signed by Josseline Barclay RN on 9/11/2022 at 9:57 AM

## 2022-09-12 ENCOUNTER — HOSPITAL ENCOUNTER (OUTPATIENT)
Dept: GENERAL RADIOLOGY | Age: 47
Discharge: HOME | End: 2022-09-12
Payer: COMMERCIAL

## 2022-09-12 ENCOUNTER — HOSPITAL ENCOUNTER (OUTPATIENT)
Dept: ONCOLOGY | Age: 47
Setting detail: INFUSION SERIES
Discharge: HOME | End: 2022-09-12
Payer: COMMERCIAL

## 2022-09-12 VITALS
TEMPERATURE: 98.1 F | SYSTOLIC BLOOD PRESSURE: 97 MMHG | WEIGHT: 135.36 LBS | HEART RATE: 89 BPM | OXYGEN SATURATION: 99 % | DIASTOLIC BLOOD PRESSURE: 64 MMHG | BODY MASS INDEX: 21.85 KG/M2 | RESPIRATION RATE: 16 BRPM

## 2022-09-12 DIAGNOSIS — C90.00 MULTIPLE MYELOMA NOT HAVING ACHIEVED REMISSION (HCC): Primary | ICD-10-CM

## 2022-09-12 LAB
ALBUMIN SERPL-MCNC: 3.7 G/DL (ref 3.4–5)
ALP BLD-CCNC: 118 U/L (ref 40–129)
ALT SERPL-CCNC: 12 U/L (ref 10–40)
ANION GAP SERPL CALCULATED.3IONS-SCNC: 7 MMOL/L (ref 3–16)
AST SERPL-CCNC: 15 U/L (ref 15–37)
BASOPHILS ABSOLUTE: 0 K/UL (ref 0–0.2)
BASOPHILS RELATIVE PERCENT: 0 %
BILIRUB SERPL-MCNC: <0.2 MG/DL (ref 0–1)
BILIRUBIN DIRECT: <0.2 MG/DL (ref 0–0.3)
BILIRUBIN URINE: NEGATIVE
BILIRUBIN, INDIRECT: ABNORMAL MG/DL (ref 0–1)
BLOOD, URINE: NEGATIVE
BUN BLDV-MCNC: 5 MG/DL (ref 7–20)
CALCIUM SERPL-MCNC: 8.8 MG/DL (ref 8.3–10.6)
CHLORIDE BLD-SCNC: 104 MMOL/L (ref 99–110)
CLARITY: CLEAR
CO2: 28 MMOL/L (ref 21–32)
COLOR: YELLOW
CREAT SERPL-MCNC: 0.7 MG/DL (ref 0.9–1.3)
EOSINOPHILS ABSOLUTE: 0 K/UL (ref 0–0.6)
EOSINOPHILS RELATIVE PERCENT: 0 %
GFR AFRICAN AMERICAN: >60
GFR NON-AFRICAN AMERICAN: >60
GLUCOSE BLD-MCNC: 154 MG/DL (ref 70–99)
GLUCOSE URINE: NEGATIVE MG/DL
HCT VFR BLD CALC: 32.3 % (ref 40.5–52.5)
HEMOGLOBIN: 11.1 G/DL (ref 13.5–17.5)
INR BLD: 1.15 (ref 0.87–1.14)
KETONES, URINE: NEGATIVE MG/DL
LACTATE DEHYDROGENASE: 205 U/L (ref 100–190)
LEUKOCYTE ESTERASE, URINE: NEGATIVE
LYMPHOCYTES ABSOLUTE: 0.5 K/UL (ref 1–5.1)
LYMPHOCYTES RELATIVE PERCENT: 8 %
MACROCYTES: ABNORMAL
MAGNESIUM: 1.6 MG/DL (ref 1.8–2.4)
MCH RBC QN AUTO: 31.4 PG (ref 26–34)
MCHC RBC AUTO-ENTMCNC: 34.2 G/DL (ref 31–36)
MCV RBC AUTO: 91.7 FL (ref 80–100)
MICROSCOPIC EXAMINATION: NORMAL
MONOCYTES ABSOLUTE: 1.2 K/UL (ref 0–1.3)
MONOCYTES RELATIVE PERCENT: 19 %
NEUTROPHILS ABSOLUTE: 4.7 K/UL (ref 1.7–7.7)
NEUTROPHILS RELATIVE PERCENT: 73 %
NITRITE, URINE: NEGATIVE
OVALOCYTES: ABNORMAL
PDW BLD-RTO: 12.3 % (ref 12.4–15.4)
PH UA: 7 (ref 5–8)
PHOSPHORUS: 2.2 MG/DL (ref 2.5–4.9)
PLATELET # BLD: 51 K/UL (ref 135–450)
PLATELET SLIDE REVIEW: ABNORMAL
PMV BLD AUTO: 9.2 FL (ref 5–10.5)
POLYCHROMASIA: ABNORMAL
POTASSIUM SERPL-SCNC: 3.6 MMOL/L (ref 3.5–5.1)
PROTEIN UA: NEGATIVE MG/DL
PROTHROMBIN TIME: 14.6 SEC (ref 11.7–14.5)
RBC # BLD: 3.52 M/UL (ref 4.2–5.9)
SODIUM BLD-SCNC: 139 MMOL/L (ref 136–145)
SPECIFIC GRAVITY UA: 1.01 (ref 1–1.03)
TOTAL PROTEIN: 5.6 G/DL (ref 6.4–8.2)
URIC ACID, SERUM: 2.2 MG/DL (ref 3.5–7.2)
URINE TYPE: NORMAL
UROBILINOGEN, URINE: 0.2 E.U./DL
WBC # BLD: 6.4 K/UL (ref 4–11)

## 2022-09-12 PROCEDURE — 85610 PROTHROMBIN TIME: CPT

## 2022-09-12 PROCEDURE — 85025 COMPLETE CBC W/AUTO DIFF WBC: CPT

## 2022-09-12 PROCEDURE — 83735 ASSAY OF MAGNESIUM: CPT

## 2022-09-12 PROCEDURE — 6360000002 HC RX W HCPCS: Performed by: INTERNAL MEDICINE

## 2022-09-12 PROCEDURE — 96360 HYDRATION IV INFUSION INIT: CPT

## 2022-09-12 PROCEDURE — 84550 ASSAY OF BLOOD/URIC ACID: CPT

## 2022-09-12 PROCEDURE — 80076 HEPATIC FUNCTION PANEL: CPT

## 2022-09-12 PROCEDURE — 81003 URINALYSIS AUTO W/O SCOPE: CPT

## 2022-09-12 PROCEDURE — 96361 HYDRATE IV INFUSION ADD-ON: CPT

## 2022-09-12 PROCEDURE — 36592 COLLECT BLOOD FROM PICC: CPT

## 2022-09-12 PROCEDURE — 71045 X-RAY EXAM CHEST 1 VIEW: CPT

## 2022-09-12 PROCEDURE — 80048 BASIC METABOLIC PNL TOTAL CA: CPT

## 2022-09-12 PROCEDURE — 84100 ASSAY OF PHOSPHORUS: CPT

## 2022-09-12 PROCEDURE — 83615 LACTATE (LD) (LDH) ENZYME: CPT

## 2022-09-12 RX ORDER — 0.9 % SODIUM CHLORIDE 0.9 %
1000 INTRAVENOUS SOLUTION INTRAVENOUS ONCE
Status: DISCONTINUED | OUTPATIENT
Start: 2022-09-12 | End: 2022-09-13 | Stop reason: HOSPADM

## 2022-09-12 RX ORDER — MAGNESIUM SULFATE IN WATER 40 MG/ML
4000 INJECTION, SOLUTION INTRAVENOUS PRN
OUTPATIENT
Start: 2022-09-13

## 2022-09-12 RX ORDER — DIMETHICONE, CAMPHOR (SYNTHETIC), MENTHOL, AND PHENOL 1.1; .5; .625; .5 G/100G; G/100G; G/100G; G/100G
OINTMENT TOPICAL PRN
Status: DISCONTINUED | OUTPATIENT
Start: 2022-09-12 | End: 2022-09-13 | Stop reason: HOSPADM

## 2022-09-12 RX ORDER — OXYCODONE HYDROCHLORIDE 5 MG/1
10 TABLET ORAL EVERY 4 HOURS PRN
OUTPATIENT
Start: 2022-09-13

## 2022-09-12 RX ORDER — ONDANSETRON 4 MG/1
8 TABLET, FILM COATED ORAL EVERY 8 HOURS PRN
Status: DISCONTINUED | OUTPATIENT
Start: 2022-09-12 | End: 2022-09-13 | Stop reason: HOSPADM

## 2022-09-12 RX ORDER — POTASSIUM CHLORIDE 29.8 MG/ML
20 INJECTION INTRAVENOUS PRN
Status: DISCONTINUED | OUTPATIENT
Start: 2022-09-12 | End: 2022-09-13 | Stop reason: HOSPADM

## 2022-09-12 RX ORDER — PROCHLORPERAZINE EDISYLATE 5 MG/ML
10 INJECTION INTRAMUSCULAR; INTRAVENOUS EVERY 6 HOURS PRN
Status: DISCONTINUED | OUTPATIENT
Start: 2022-09-12 | End: 2022-09-13 | Stop reason: HOSPADM

## 2022-09-12 RX ORDER — OXYCODONE HYDROCHLORIDE 5 MG/1
5 TABLET ORAL EVERY 4 HOURS PRN
Status: DISCONTINUED | OUTPATIENT
Start: 2022-09-12 | End: 2022-09-13 | Stop reason: HOSPADM

## 2022-09-12 RX ORDER — POTASSIUM CHLORIDE 29.8 MG/ML
80 INJECTION INTRAVENOUS PRN
OUTPATIENT
Start: 2022-09-13

## 2022-09-12 RX ORDER — OXYCODONE HYDROCHLORIDE 5 MG/1
5 TABLET ORAL EVERY 4 HOURS PRN
OUTPATIENT
Start: 2022-09-13

## 2022-09-12 RX ORDER — POTASSIUM CHLORIDE 20 MEQ/1
40 TABLET, EXTENDED RELEASE ORAL PRN
OUTPATIENT
Start: 2022-09-13

## 2022-09-12 RX ORDER — ONDANSETRON 2 MG/ML
8 INJECTION INTRAMUSCULAR; INTRAVENOUS EVERY 8 HOURS PRN
Status: DISCONTINUED | OUTPATIENT
Start: 2022-09-12 | End: 2022-09-13 | Stop reason: HOSPADM

## 2022-09-12 RX ORDER — 0.9 % SODIUM CHLORIDE 0.9 %
1000 INTRAVENOUS SOLUTION INTRAVENOUS ONCE
OUTPATIENT
Start: 2022-09-13 | End: 2022-09-13

## 2022-09-12 RX ORDER — HEPARIN SODIUM (PORCINE) LOCK FLUSH IV SOLN 100 UNIT/ML 100 UNIT/ML
500 SOLUTION INTRAVENOUS PRN
OUTPATIENT
Start: 2022-09-13

## 2022-09-12 RX ORDER — PROCHLORPERAZINE EDISYLATE 5 MG/ML
10 INJECTION INTRAMUSCULAR; INTRAVENOUS EVERY 6 HOURS PRN
OUTPATIENT
Start: 2022-09-13

## 2022-09-12 RX ORDER — SODIUM CHLORIDE 9 MG/ML
INJECTION, SOLUTION INTRAVENOUS CONTINUOUS PRN
Status: DISCONTINUED | OUTPATIENT
Start: 2022-09-12 | End: 2022-09-13 | Stop reason: HOSPADM

## 2022-09-12 RX ORDER — ONDANSETRON 4 MG/1
8 TABLET, FILM COATED ORAL EVERY 8 HOURS PRN
OUTPATIENT
Start: 2022-09-13

## 2022-09-12 RX ORDER — ONDANSETRON 2 MG/ML
8 INJECTION INTRAMUSCULAR; INTRAVENOUS EVERY 8 HOURS PRN
OUTPATIENT
Start: 2022-09-13

## 2022-09-12 RX ORDER — OXYCODONE HYDROCHLORIDE 5 MG/1
10 TABLET ORAL EVERY 4 HOURS PRN
Status: DISCONTINUED | OUTPATIENT
Start: 2022-09-12 | End: 2022-09-13 | Stop reason: HOSPADM

## 2022-09-12 RX ORDER — SODIUM CHLORIDE 9 MG/ML
INJECTION, SOLUTION INTRAVENOUS CONTINUOUS PRN
OUTPATIENT
Start: 2022-09-13

## 2022-09-12 RX ORDER — MAGNESIUM SULFATE IN WATER 40 MG/ML
4000 INJECTION, SOLUTION INTRAVENOUS PRN
Status: DISCONTINUED | OUTPATIENT
Start: 2022-09-12 | End: 2022-09-13 | Stop reason: HOSPADM

## 2022-09-12 RX ORDER — PETROLATUM, MENTHOL, UNSPECIFIED FORM, CAMPHOR (SYNTHETIC), AND PHENOL 59.14; 1; 1; .6 G/100G; G/100G; G/100G; G/100G
PASTE TOPICAL PRN
OUTPATIENT
Start: 2022-09-13

## 2022-09-12 RX ORDER — PROCHLORPERAZINE MALEATE 10 MG
10 TABLET ORAL EVERY 6 HOURS PRN
Status: DISCONTINUED | OUTPATIENT
Start: 2022-09-12 | End: 2022-09-13 | Stop reason: HOSPADM

## 2022-09-12 RX ORDER — POTASSIUM CHLORIDE 20 MEQ/1
40 TABLET, EXTENDED RELEASE ORAL PRN
Status: DISCONTINUED | OUTPATIENT
Start: 2022-09-12 | End: 2022-09-13 | Stop reason: HOSPADM

## 2022-09-12 RX ORDER — PROCHLORPERAZINE MALEATE 10 MG
10 TABLET ORAL EVERY 6 HOURS PRN
OUTPATIENT
Start: 2022-09-13

## 2022-09-12 RX ORDER — HEPARIN SODIUM (PORCINE) LOCK FLUSH IV SOLN 100 UNIT/ML 100 UNIT/ML
500 SOLUTION INTRAVENOUS PRN
Status: DISCONTINUED | OUTPATIENT
Start: 2022-09-12 | End: 2022-09-13 | Stop reason: HOSPADM

## 2022-09-12 RX ADMIN — SODIUM CHLORIDE, PRESERVATIVE FREE 500 UNITS: 5 INJECTION INTRAVENOUS at 11:15

## 2022-09-12 NOTE — PROGRESS NOTES
[address] [facility]  Autologous Progress Note    2022    Roland Cordova    :  1975    MRN:  [id number]    Referring MD: MD Pepper Paige 1945,  [address]      Subjective: nausea is better. No emesis. Afebrile     ECOG PS:  (1) Restricted in physically strenuous activity, ambulatory and able to do work of light nature    KPS: 80% Normal activity with effort; some signs or symptoms of disease    Medications       Medication List        ASK your doctor about these medications      atorvastatin 20 MG tablet  Commonly known as: LIPITOR  Take 1 tablet by mouth every evening TAKE 1 TABLET DAILY     clotrimazole 10 MG aleksander  Commonly known as: MYCELEX  Take 1 tablet by mouth 4 times daily for 7 days     dicyclomine 10 MG capsule  Commonly known as: Bentyl  Take 1 capsule by mouth in the morning and 1 capsule at noon and 1 capsule in the evening and 1 capsule before bedtime. gabapentin 300 MG capsule  Commonly known as: NEURONTIN     lisinopril 20 MG tablet  Commonly known as: PRINIVIL;ZESTRIL  Take 1 tablet by mouth daily     OLANZapine 5 MG tablet  Commonly known as: ZyPREXA  Take 2 tablets by mouth nightly     ondansetron 8 MG tablet  Commonly known as: Zofran  Take 1 tablet by mouth every 8 (eight) hours for 10 days     pantoprazole 40 MG tablet  Commonly known as: PROTONIX  Take 1 tablet by mouth in the morning and 1 tablet in the evening. potassium chloride 20 MEQ extended release tablet  Commonly known as: KLOR-CON M  Take 1 tablet by mouth 2 times daily     prochlorperazine 10 MG tablet  Commonly known as: COMPAZINE  Take 1 tablet by mouth in the morning and 1 tablet at noon and 1 tablet in the evening and 1 tablet before bedtime.      valACYclovir 500 MG tablet  Commonly known as: Valtrex  Take 1 tablet by mouth 2 times daily     Vitamin C 500 MG Caps     VITAMIN D PO             ROS:  As noted above, otherwise remainder of 10-point ROS negative    Physical Exam:     I&O:  No intake or output data in the 24 hours ending 09/12/22 1014        Vital Signs:  BP 97/64   Pulse 89   Temp 98.1 °F (36.7 °C) (Oral)   Resp 16   Wt 135 lb 5.8 oz (61.4 kg)   SpO2 99%   BMI 21.85 kg/m²     Weight:    Wt Readings from Last 3 Encounters:   09/12/22 135 lb 5.8 oz (61.4 kg)   09/11/22 136 lb 11 oz (62 kg)   09/10/22 136 lb 0.4 oz (61.7 kg)       General: Awake, alert and oriented. HEENT: normocephalic, alopecia, PERRL, no scleral erythema or icterus, Oral mucosa moist and intact, throat clear  NECK: supple without palpable adenopathy  BACK: Straight negative CVAT  SKIN: warm dry and intact without lesions rashes or masses  CHEST: CTA bilaterally without use of accessory muscles  CV: Normal S1 S2, RRR, no MRG  ABD: NT ND normoactive BS, no palpable masses or hepatosplenomegaly  EXTREMITIES: without edema, denies calf tenderness  NEURO: CN II - XII grossly intact  CATHETER: Right Subclavian Trifusion (IR: Dianne, 7/22/22): CDI      Data:   CBC:   Recent Labs     09/10/22  0840 09/11/22  0830 09/12/22  0845   WBC 1.2* 3.6* 6.4   HGB 11.4* 11.4* 11.1*   HCT 32.6* 32.5* 32.3*   MCV 90.8 90.7 91.7   PLT 30* 37* 51*       BMP/Mag:  Recent Labs     09/10/22  0840 09/11/22  0830 09/12/22  0845    140 139   K 3.6 3.4* 3.6    105 104   CO2 26 26 28   PHOS 2.9 2.3* 2.2*   BUN 5* 4* 5*   CREATININE 0.8* 0.7* 0.7*   MG  --   --  1.60*       LIVP:   Recent Labs     09/12/22  0845   AST 15   ALT 12   BILIDIR <0.2   BILITOT <0.2   ALKPHOS 118       Uric Acid:    Recent Labs     09/12/22  0845   LABURIC 2.2*       Coags:   Recent Labs     09/12/22  0845   PROTIME 14.6*   INR 1.15*         PROBLEM LIST:            Multiple Myeloma  HTN  HLD  Positive H. pylori breath test  Emend hypersensitivity rxn (8/2022)  CINV      TREATMENT:            RVd on (4/11/22) x 4 cycles   [id number] & ASCT (8/30/22) w/ 4.84 x 10^6 CD34+ cells/kg     ASSESSMENT AND PLAN:            1.  IgG lambda myeloma, ISS stage II: VGPR  - Bone marrow biopsy (3/10/2022): showed 70% cellularity with plasma cells estimated to account for between 30-50%. 46,XY[20]. Myeloma ROBIN panel showed three copies of CCND1 (11q13. 3) in 83% of cells, four copies of CCND1 (11q13. 3) in 5% of cells, three copies of MAF (16q23.2) in 54% of cells, and monosomy 13 in 9% of cells. - PET/CT (3/9/22) - showed no evidence of hypermetabolism. - Pkc631 & ASCT (8/30/22) w/ 4.84 x 10^6 CD34+ cells/kg     Emend Hypersensitivity 8/29/22 including facial flushing, stomach cramping. D/C'ed and started PO Zyprexa 10 mg once 8/29/22 then 5 mg PO daily for 3 more days, extended an additional 3 days. Day + 13     2. ID: Afebrile, no evidence of infection.    - Cont valtrex for ppx      3. Heme: Anemia & Thrombocytopenia 2/2 recent chemotherapy - improving  - Transfuse for Hgb < 7 and Platelets < 18P  - No transfusion today  - S/p daily G-CSF (9/4/22 - 9/1122)     4. Metabolic: Mild Hyperglycemia w/ stable renal fxn stable. 5. Nutrition / GI:    - Hx positive H. pylori breath   - EGD & Colonoscopy Bx (8/18/22) - negative   Nutrition:  Appetite and oral intake is okay  - Cont low microbial diet   - Follow with dietary  GI: Nausea improved  - Cont PPI BID   - Cont Zofran q8hrs (scheduled 8/31/22) and Compazine q6hrs (prn). If nausea continues to improve will start to back off on the Zofran 9/6/22  - may need Ativan for nausea as anxiety may be contributing     6. Pulmonary: No active issues   - DLCO of 75% of predicted finding on ASCT workup: No obvious etiology for this  - CXR (8/29/22): Negative for acute disease     7. Prevention of SRE:    - PET/CT (3/9/22) - without bony disease.    - Hold off on a bisphosphonate. 8. Peripheral Neuropathy:   - Cont gabapentin 300 mg nighty (Consider increasing dose if symptoms persist)     9.   Cardiac: H/o HTN  HTN:  stable   - S/p Lisinopril 20 mg daily (stopped 9/3/22)       - Disposition: D/c to [facility] f/u - RTC Wed 9/14/22     BRINDA Borjas - CNP

## 2022-09-12 NOTE — PROGRESS NOTES
Short Stay Communication Note  Hardy Jacob  Diagnosis: Multiple Myeloma  Primary MD: Dr. Faby Long  Treatment: Melphalan Fludarabine/Cytoxan  Day +13 of Auto Transplant   Pt seen in outpatient infusion today. Labs drawn and reviewed. CBC:   Recent Labs     09/10/22  0840 09/11/22  0830 09/12/22  0845   WBC 1.2* 3.6* 6.4   HGB 11.4* 11.4* 11.1*   HCT 32.6* 32.5* 32.3*   MCV 90.8 90.7 91.7   PLT 30* 37* 51*       BMP/Mag:  Recent Labs     09/10/22  0840 09/11/22  0830 09/12/22  0845    140 139   K 3.6 3.4* 3.6    105 104   CO2 26 26 28   PHOS 2.9 2.3* 2.2*   BUN 5* 4* 5*   CREATININE 0.8* 0.7* 0.7*   MG  --   --  1.60*       Standing parameters for replacement for this patient:   1 unit of pack red blood cells for a hemoglobin < or equal to 7.0  1 pack of platelets for a platelet count less than or equal to 10.0  40 MeQ of Potassium administered for a potassium level less than or equal to 3.4  4g of Magnesium Sulfate for a magnesum level less than or equal to 1.4  No replacements needed today. Urinalysis last done: 9/12/2022 Urinalysis next due: 9/19/2022    Chest X-Ray last done: 9/12/2022 Chest X-Ray next due: 9/19/2022    Symptoms addressed and reported to care team this date: still having diarrhea, but not as watery today, still taking imodium  Treatments this date: IV Fluids    Reviewed medication schedule with pt and caregiver. Both able to verbalize all medications and schedule. Pt to be seen again tomorrow. Patient verbalized understanding of discharge instructions including when and how to call the doctor and when to report  to the ER. Discharged ambulatory to home. CVC dressing and all caps changed, heparin locked.      Electronically signed by Claudette Quinn RN on 9/12/2022 at 9:49 AM

## 2022-09-12 NOTE — PROGRESS NOTES
Behavioral Health Intervention Note    Assessment/Plan: Ashley Myers is a 50y.o.  year-old male is here for autologous stem cell transplant for multiple myeloma (Day +14). Main concerns at this time include frustration related to nausea and helping children cope with patient's treatment. Current outpatient mental health treatment includes none. Behavioral health recommendations for treatment team include none at this time. Patient reports that he has been doing well emotionally with the exception of frustration surrounding persistent nausea throughout transplant. Discussed relaxation strategies to manage frustration and not further exacerbate nausea. Patient also reports that his children are uneasy about patient shaving his head. Discussed potential strategies to assist with this. Interventions: Supportive listening, Assessment of current needs, Deep breathing, Grounding skills, Progressive muscle relaxation, Problem-solving     --------------------------------------------------------------------------------------------    At initial encounter with patient, discussed role of psychologist including addressing emotional and behavioral needs while receiving care at the [facility]/[place] [address]. Discussed short-term nature of services. Also discussed with patient that a component of provider's role is completing the pre-surgical psychological evaluations for bone marrow transplant and CAR-T. Informed patient of psychologist's various roles in order for the patient to be fully informed when consenting to behavioral health treatment. Patient was agreeable to engaging in care with psychologist. Discussed limits of confidentiality including that psychologist coordinates with patient's treatment team and other limits of confidentiality were discussed as needed.

## 2022-09-12 NOTE — PROGRESS NOTES
[address]  Autologous Progress Note    2022    Samantha Charles    :  1975    MRN:  [id number]    Referring MD: MD Pepper Cordon 1945,  [address]      Subjective:   nausea is better. No emesis. Afebrile     ECOG PS:  (1) Restricted in physically strenuous activity, ambulatory and able to do work of light nature    KPS: 80% Normal activity with effort; some signs or symptoms of disease    Medications       Medication List         Notice    Cannot display patient medications because the patient has not yet been checked in. ROS:  As noted above, otherwise remainder of 10-point ROS negative    Physical Exam:     I&O:  No intake or output data in the 24 hours ending 22 0755        Vital Signs: There were no vitals taken for this visit. Weight:    Wt Readings from Last 3 Encounters:   22 136 lb 11 oz (62 kg)   09/10/22 136 lb 0.4 oz (61.7 kg)   22 135 lb 12.9 oz (61.6 kg)       General: Awake, alert and oriented.   HEENT: normocephalic, alopecia, PERRL, no scleral erythema or icterus, Oral mucosa moist and intact, throat clear  NECK: supple without palpable adenopathy  BACK: Straight negative CVAT  SKIN: warm dry and intact without lesions rashes or masses  CHEST: CTA bilaterally without use of accessory muscles  CV: Normal S1 S2, RRR, no MRG  ABD: NT ND normoactive BS, no palpable masses or hepatosplenomegaly  EXTREMITIES: without edema, denies calf tenderness  NEURO: CN II - XII grossly intact  CATHETER: Right Subclavian Trifusion (IR: Dianne, 22): CDI      Data:   CBC:   Recent Labs     22  0830 09/10/22  0840 22  0830   WBC 0.3* 1.2* 3.6*   HGB 11.5* 11.4* 11.4*   HCT 31.8* 32.6* 32.5*   MCV 90.3 90.8 90.7   PLT 31* 30* 37*       BMP/Mag:  Recent Labs     22  0830 09/10/22  0840 22  0830    140 140   K 3.6 3.6 3.4*    104 105   CO2 28 26 26   PHOS 3.2 2.9 2.3*   BUN 5* 5* 4*   CREATININE 0.9 0.8* 0.7*   MG 1.40*  --   --        LIVP:   Recent Labs     09/09/22  0830   AST 12*   ALT 10   BILIDIR <0.2   BILITOT 0.3   ALKPHOS 84       Uric Acid:    Recent Labs     09/09/22  0830   LABURIC 1.7*       Coags:   No results for input(s): PROTIME, INR, APTT in the last 72 hours. PROBLEM LIST:            Multiple Myeloma  HTN  HLD  Positive H. pylori breath test  Emend hypersensitivity rxn (8/2022)  CINV      TREATMENT:            RVd on (4/11/22) x 4 cycles   [id number] & ASCT (8/30/22) w/ 4.84 x 10^6 CD34+ cells/kg     ASSESSMENT AND PLAN:            1. IgG lambda myeloma, ISS stage II: VGPR  - Bone marrow biopsy (3/10/2022): showed 70% cellularity with plasma cells estimated to account for between 30-50%. 46,XY[20]. Myeloma ROBIN panel showed three copies of CCND1 (11q13. 3) in 83% of cells, four copies of CCND1 (11q13. 3) in 5% of cells, three copies of MAF (16q23.2) in 54% of cells, and monosomy 13 in 9% of cells. - PET/CT (3/9/22) - showed no evidence of hypermetabolism. - Muc248 & ASCT (8/30/22) w/ 4.84 x 10^6 CD34+ cells/kg     Emend Hypersensitivity 8/29/22 including facial flushing, stomach cramping. D/C'ed and started PO Zyprexa 10 mg once 8/29/22 then 5 mg PO daily for 3 more days, extended an additional 3 days. Day + 13     2. ID: Afebrile, no evidence of infection. - switch to valtrex for ppx   - Stop Levaquin & Diflucan ppx (9/11/22): Restart if ANC < 1.5     3. Heme: Pancytopenia (WBC starting to recover)   - Transfuse for Hgb < 7 and Platelets < 30U  - No transfusion today  - S/p daily G-CSF (9/4/22 - 9/1122)     4. Metabolic: Mild Hyperglycemia w/ stable renal fxn stable. - Cont IVF hydration: 1 L NS each day in OP Infusion  - Replace potassium and magnesium per policy.      5. Nutrition / GI:    - Hx positive H. pylori breath   - EGD & Colonoscopy Bx (8/18/22) - negative   Nutrition:  Appetite and oral intake is okay  - Cont low microbial diet   - Follow with dietary  GI: Persistent nausea   - Cont PPI BID   - Cont Zofran q8hrs (scheduled 8/31/22) and Compazine q6hrs (prn). If nausea continues to improve will start to back off on the Zofran 9/6/22  - Cont Zyprexa 5 mg nightly; increase to 10 mg nightly (9/8/22)    - may need Ativan for nausea as anxiety may be contributing   - S/p IV Fluconazole and IV Acyclovir (morning dose) in OP Infusion each morning unless nausea subsides (started 9/6/22 - 9/11/22)     6. Pulmonary: No active issues   - DLCO of 75% of predicted finding on ASCT workup: No obvious etiology for this  - CXR (8/29/22): Negative for acute disease     7. Prevention of SRE:    - PET/CT (3/9/22) - without bony disease.    - Hold off on a bisphosphonate. 8. Peripheral Neuropathy:   - Cont gabapentin 300 mg nighty (Consider increasing dose if symptoms persist)     9. Cardiac:    - H/o HTN  HTN:  stable   - S/p Lisinopril 20 mg daily (stopped 9/3/22)       - Disposition: Return to OP Infusion daily for toxicity assessment, labs and MD visit.       Hoda [place], DO

## 2022-09-15 ENCOUNTER — TELEPHONE (OUTPATIENT)
Dept: ONCOLOGY | Age: 47
End: 2022-09-15

## 2022-09-15 NOTE — TELEPHONE ENCOUNTER
Called pt for nutrition follow up D+16 s/p AUTO BMT. Left message for callback.      Electronically signed by Pooja Reynaga RD, SAURABH on 9/15/2022 at 11:10 AM

## 2022-09-20 ENCOUNTER — PRE-PROCEDURE TELEPHONE (OUTPATIENT)
Dept: CARDIAC CATH/INVASIVE PROCEDURES | Age: 47
End: 2022-09-20

## 2022-09-20 NOTE — PROGRESS NOTES
Attempted to call patient about procedure. No answer. Voicemail left. Told to be here at 1330 for procedure at 1500 NPO after midnight, but can take morning medication with sips of water, office should have told them when and if they should stop any blood thinners. To have a responsible adult be with patient take them home and stay with them afterwards, if they are not admitted to hospital afterwards. And if available bring current list of medications.

## 2022-09-21 ENCOUNTER — HOSPITAL ENCOUNTER (OUTPATIENT)
Dept: INTERVENTIONAL RADIOLOGY/VASCULAR | Age: 47
Discharge: HOME | End: 2022-09-21

## 2022-09-28 ENCOUNTER — PRE-PROCEDURE TELEPHONE (OUTPATIENT)
Dept: CARDIAC CATH/INVASIVE PROCEDURES | Age: 47
End: 2022-09-28

## 2022-09-28 NOTE — PROGRESS NOTES
Called patient about procedure. Told to be here at _0630_____ for procedure at __0800____. Must be NPO after midnight but can take morning medication with sips of water. Patient stated they __are not on_____ blood thinners  ___________ prior to procedure as directed by the office. Told to have a responsible adult with them to take them home and stay with them afterwards, if they do not get admitted to hospital. Also, to bring a current list of medications. No other questions or concerns.

## 2022-09-29 ENCOUNTER — HOSPITAL ENCOUNTER (OUTPATIENT)
Dept: INTERVENTIONAL RADIOLOGY/VASCULAR | Age: 47
Discharge: HOME | End: 2022-09-29
Payer: COMMERCIAL

## 2022-09-29 VITALS — BODY MASS INDEX: 20.89 KG/M2 | TEMPERATURE: 97 F | WEIGHT: 130 LBS | HEIGHT: 66 IN

## 2022-09-29 DIAGNOSIS — E85.9 MYELOMA ASSOCIATED AMYLOIDOSIS (HCC): ICD-10-CM

## 2022-09-29 DIAGNOSIS — C90.00 MYELOMA ASSOCIATED AMYLOIDOSIS (HCC): ICD-10-CM

## 2022-09-29 PROCEDURE — 36589 REMOVAL TUNNELED CV CATH: CPT

## 2022-09-29 PROCEDURE — 85610 PROTHROMBIN TIME: CPT

## 2022-09-29 PROCEDURE — 2500000003 HC RX 250 WO HCPCS

## 2022-09-29 NOTE — DISCHARGE INSTRUCTIONS
[facility]  Cardiovascular Special Procedures  General Discharge Instructions    PROCEDURE: Line Removal    ____ You may be drowsy or lightheaded after receiving sedation. DO NOT operate a vehicle (automobile, bicycle, motorcycle, machinery, or power tools), make any important decisions or sign any important/legal documents, or drink alcoholic beverages for the next 24 hours  ____ We strongly suggest that a responsible adult be with you for the next 24 hours for your protection and safety  ____ If the intravenous catheter site is painful, apply warm wet compresses on the site until the soreness is relieved and elevate the arm above the heart. Call your physician if no improvement in 2 to 3 days    DIETARY INSTRUCTIONS:    ____ Drink extra fluids over the next 24 hours (If not contraindicated by illness or by physician order)  ____ Start with clear liquids and progress to normal diet as you feel like eating. If you experience nausea or repeated episodes of vomiting, which persist beyond 12-24 hours, notify your doctor        _x___ Resume your previous diet    ACTIVITY INSTRUCTIONS:    ____ See other instructions  ____ No special instructions  ____ Rest for 24 hours    ____ Up as tolerated  _x___ Increase activity as tolerated    Wound/Dressing Instructions:  ____ See other instructions  ____ May shower, tomorrow  _x___ Remove bandage within 24 hours    MEDICATION INSTRUCTIONS:    ____ See Medication Reconciliation Sheet      SPECIAL INSTRUCTIONS:  Take bandage off in 24- 48 hours. Place band-aid on site until completely healed. Showering is okay as long as site remains dry until healed, do not submerge under water until healed. Do not lift anything over 10 pounds for 3-5 days while site is healing.                                                                                                                                                           Please make sure that you follow-up with your

## 2022-09-29 NOTE — H&P
jvd  Chest: Normal  Heart: Regular rate and rhythm  Abdominal: soft, non-tender. Bowel sounds normal. No masses,  no organomegaly  Neurological: normal    Labs:  CBCP:  Lab Results   Component Value Date    WBC 6.4 09/12/2022    HGB 11.1 (L) 09/12/2022    HCT 32.3 (L) 09/12/2022    MCV 91.7 09/12/2022    PLT 51 (L) 09/12/2022      BASIC:  Lab Results   Component Value Date/Time     09/12/2022 08:45 AM    K 3.6 09/12/2022 08:45 AM    K 4.3 06/23/2021 06:30 AM     09/12/2022 08:45 AM    CO2 28 09/12/2022 08:45 AM    BUN 5 09/12/2022 08:45 AM    CREATININE 0.7 09/12/2022 08:45 AM    GLUCOSE 154 09/12/2022 08:45 AM    CALCIUM 8.8 09/12/2022 08:45 AM       COAGS:  Lab Results   Component Value Date    INR 1.15 (H) 09/12/2022    INR 1.17 (H) 09/08/2022    INR 1.12 09/05/2022    PROTIME 14.6 (H) 09/12/2022    PROTIME 14.9 (H) 09/08/2022    PROTIME 14.3 09/05/2022        Assessment: 50 y.o. male with myeloma. Plan:  Will plan for mosley catheter removal.    Divya Martini MD  09/29/22  8:39 AM

## 2022-10-03 ENCOUNTER — TELEPHONE (OUTPATIENT)
Dept: ONCOLOGY | Age: 47
End: 2022-10-03

## 2022-10-03 LAB — INR BLD: 1.2 (ref 0.88–1.12)

## 2022-10-03 NOTE — TELEPHONE ENCOUNTER
Behavioral Health Follow-up Note    Purpose for call: Psychologist called patient for routine 30-day follow-up post-autologous stem cell transplant. Subjective:    Patient reports that he is doing well emotionally. He reports that he has been walking outside more. Patient still complains of nausea, but reports that this is more of a \"nuisance\" than anything else. He denied behavioral health needs today. Recommendations/Plan:  Will call for routine follow-up around Day 60.

## 2022-10-04 ENCOUNTER — OFFICE VISIT (OUTPATIENT)
Dept: INTERNAL MEDICINE CLINIC | Age: 47
End: 2022-10-04
Payer: COMMERCIAL

## 2022-10-04 VITALS
SYSTOLIC BLOOD PRESSURE: 124 MMHG | BODY MASS INDEX: 21.19 KG/M2 | HEIGHT: 67 IN | DIASTOLIC BLOOD PRESSURE: 60 MMHG | OXYGEN SATURATION: 99 % | HEART RATE: 112 BPM | WEIGHT: 135 LBS

## 2022-10-04 DIAGNOSIS — N52.9 ERECTILE DYSFUNCTION, UNSPECIFIED ERECTILE DYSFUNCTION TYPE: ICD-10-CM

## 2022-10-04 DIAGNOSIS — I10 HYPERTENSION, ESSENTIAL: ICD-10-CM

## 2022-10-04 DIAGNOSIS — E78.2 HYPERLIPIDEMIA, MIXED: ICD-10-CM

## 2022-10-04 DIAGNOSIS — C90.00 MULTIPLE MYELOMA NOT HAVING ACHIEVED REMISSION (HCC): ICD-10-CM

## 2022-10-04 DIAGNOSIS — Z00.00 ROUTINE GENERAL MEDICAL EXAMINATION AT A HEALTH CARE FACILITY: Primary | ICD-10-CM

## 2022-10-04 DIAGNOSIS — R73.01 IFG (IMPAIRED FASTING GLUCOSE): ICD-10-CM

## 2022-10-04 PROCEDURE — 99396 PREV VISIT EST AGE 40-64: CPT | Performed by: FAMILY MEDICINE

## 2022-10-04 RX ORDER — VALACYCLOVIR HYDROCHLORIDE 500 MG/1
500 TABLET, FILM COATED ORAL 2 TIMES DAILY
Qty: 60 TABLET | Refills: 2 | Status: CANCELLED | OUTPATIENT
Start: 2022-10-04

## 2022-10-04 RX ORDER — SILDENAFIL 100 MG/1
100 TABLET, FILM COATED ORAL DAILY PRN
Qty: 30 TABLET | Refills: 1 | Status: SHIPPED | OUTPATIENT
Start: 2022-10-04

## 2022-10-04 NOTE — PROGRESS NOTES
History and Physical      Magaly Mcneal  YOB: 1975    Date of Service:  10/4/2022    Chief Complaint:   Magaly Mcneal is a 50 y.o. male who presents for complete physical examination. HPI: Pt had bone marrow transplant on 8/30/22 for multiple myeloma. Doing very well. Sees Dr. Kay Raymond at [facility] in the Transplant Department. Initial Heme/Onc provider was Dr. Kevan Ray    Pt anticipates being able to return to work in December. Currently off BP med since BP had been going low. Has stayed well controlled. Also off statin. Has been eating a lot of high fat foods in order to gain weight back. Recent lipid panel on 9/28/22 showed , HDL 35, , and . C/O erectile dysfunction- trouble maintaining adequate erection. Asks for something to help with this.       Wt Readings from Last 3 Encounters:   10/04/22 135 lb (61.2 kg)   09/29/22 130 lb (59 kg)   09/12/22 135 lb 5.8 oz (61.4 kg)     BP Readings from Last 3 Encounters:   10/04/22 124/60   09/12/22 97/64   09/11/22 95/63       Patient Active Problem List   Diagnosis    Hyperlipidemia, mixed    Hyperglycemia    GERD (gastroesophageal reflux disease)    Rhinitis, likely nonallergic (IgE neg 11/09) but sx seasonal    Vitamin D insufficiency    Lumbar spondylosis    Hypertension, essential    Fibrous dysplasia of bone    Greater trochanteric bursitis of left hip    Strain of acromioclavicular joint    Pain of left shoulder region    Left inguinal hernia    Multiple myeloma not having achieved remission (HCC)    Autologous donor of stem cells       Allergies   Allergen Reactions    Emend [Fosaprepitant Dimeglumine] Nausea Only and Other (See Comments)     Flushing, lightheadedness, abdominal discomfort     Outpatient Medications Marked as Taking for the 10/4/22 encounter (Office Visit) with Mateo Reilly MD   Medication Sig Dispense Refill    valACYclovir (VALTREX) 500 MG tablet Take 1 tablet by mouth 2 times daily 60 tablet 2    pantoprazole (PROTONIX) 40 MG tablet Take 1 tablet by mouth in the morning and 1 tablet in the evening.  60 tablet 0       Past Medical History:   Diagnosis Date    Allergic rhinitis     GERD (gastroesophageal reflux disease)     HTN (hypertension) 9/26/2013    Hyperlipidemia     Lumbar spondylolysis     Multiple myeloma not having achieved remission ([facility] [address] 4/12/2022    PONV (postoperative nausea and vomiting)     Tight ring on finger     Vitamin D deficiency      Past Surgical History:   Procedure Laterality Date    HERNIA REPAIR  as infant    Bilaterally    HERNIA REPAIR N/A 6/23/2021    ROBOTIC LEFT INGUINAL HERNIA REPAIR WITH MESH performed by Marleny Bernal MD at [place] [address]    IR TUNNELED [address] 5 YEARS  7/22/2022    IR TUNNELED CATHETER PLACEMENT GREATER THAN 5 YEARS 7/22/2022 Buzzy Caitlin PROCEDURES    VASECTOMY  2010    Dr. Abiodun Cleveland EXTRACTION       Family History   Problem Relation Age of Onset    Hypertension Mother     Stroke Mother     High Cholesterol Mother     Depression Mother     Anxiety Disorder Mother     High Cholesterol Father     Prostate Cancer Father 77        s/p prostatectomy, no other treatment needed    Other Maternal Grandfather         Clotting disorder    Crohn's Disease Other      Social History     Socioeconomic History    Marital status:      Spouse name: Not on file    Number of children: Not on file    Years of education: Not on file    Highest education level: Not on file   Occupational History    Not on file   Tobacco Use    Smoking status: Never    Smokeless tobacco: Never   Vaping Use    Vaping Use: Never used   Substance and Sexual Activity    Alcohol use: Yes     Comment: socially    Drug use: No    Sexual activity: Yes     Partners: Female     Comment: single partner (wife)   Other Topics Concern    Not on file   Social History Narrative    Not on file     Social Determinants of Health     Financial Resource Strain: Low Risk     Difficulty of Paying Living Expenses: Not hard at all   Food Insecurity: No Food Insecurity    Worried About Running Out of Food in the Last Year: Never true    Ran Out of Food in the Last Year: Never true   Transportation Needs: Not on file   Physical Activity: Not on file   Stress: Not on file   Social Connections: Not on file   Intimate Partner Violence: Not on file   Housing Stability: Not on file       Review of Systems:  A comprehensive review of systems was negative except for what was noted in the HPI. Physical Exam:   Vitals:    10/04/22 1150   BP: 124/60   Site: Right Upper Arm   Position: Sitting   Cuff Size: Large Adult   Pulse: (!) 112   SpO2: 99%   Weight: 135 lb (61.2 kg)   Height: 5' 6.5\" (1.689 m)     Body mass index is 21.46 kg/m². Constitutional: He is oriented to person, place, and time. He appears well-developed and well-nourished. No distress. HEENT:   Head: Normocephalic and atraumatic. Right Ear: Tympanic membrane, external ear and ear canal normal.   Left Ear: Tympanic membrane, external ear and ear canal normal.   Mouth/Throat: Oropharynx is clear and moist and mucous membranes are normal. No oropharyngeal exudate or posterior oropharyngeal erythema. He has no cervical adenopathy. Eyes: Conjunctivae and extraocular motions are normal. Pupils are equal, round, and reactive to light. Neck:  Supple. No JVD present. Carotid bruit is not present. No mass and no thyromegaly present. Cardiovascular: Normal rate, regular rhythm, normal heart sounds and intact distal pulses. Exam reveals no gallop and no friction rub. No murmur heard. Pulmonary/Chest: Effort normal and breath sounds normal. No respiratory distress. He has no wheezes, rhonchi or rales. Abdominal: Soft, non-tender. Bowel sounds and aorta are normal. There is no organomegaly, mass or bruit. Genitourinary:  not examined. Musculoskeletal: Normal range of motion, no synovitis.  He exhibits no edema. Neurological: He is alert and oriented to person, place, and time. He has normal reflexes. No cranial nerve deficit. Coordination normal.   Skin: Skin is warm, dry and intact. No suspicious lesions are noted. Psychiatric: He has a normal mood and affect. His speech is normal and behavior is normal. Judgment, cognition and memory are normal.     Preventive Care:  Health Maintenance   Topic Date Due    Hepatitis C screen  Never done    COVID-19 Vaccine (4 - Booster for Moderna series) 03/24/2022    Flu vaccine (1) 08/01/2022    Depression Screen  02/14/2023    Lipids  09/28/2027    DTaP/Tdap/Td vaccine (3 - Td or Tdap) 05/22/2028    Colorectal Cancer Screen  08/18/2032    Pneumococcal 0-64 years Vaccine  Completed    HIV screen  Completed    Hepatitis A vaccine  Aged Out    Hepatitis B vaccine  Aged Out    Hib vaccine  Aged Out    Meningococcal (ACWY) vaccine  Aged Out        Last eye exam: within the past year, normal  Exercise: just restarted exercise a few days ago s/p BMT  Seatbelt use: yes       Preventive plan of care for Sushila Reyes        10/4/2022           Preventive Measures Status       Recommendations for screening   Prostate Cancer Screen  Lab Results   Component Value Date    PSA 0.26 02/08/2022      Repeat yearly    Colon Cancer Screen  Last colonoscopy: 8/18/22 Repeat in 10 years   Diabetes Screen  Glucose (mg/dL)   Date Value   09/12/2022 154 (H)    Getting routine frequent labs with [facility]- will recheck at next visit   Cholesterol Screen  Lab Results   Component Value Date    CHOL 91 02/08/2022    TRIG 61 02/08/2022    HDL 35 (L) 02/08/2022    LDLCALC 44 02/08/2022    Just had lipid panel last week- see HPI   Aspirin for Cardiovascular Prevention   No Not indicated   Weight: Body mass index is 21.46 kg/m².   5' 6.5\" (1.689 m)135 lb (61.2 kg)  Your BMI is between 18.5 and 24.9, which indicates that you are at a healthy weight  Resume healthy lifestyle (low faat diet/ exercise) when cleared by [place]. Living Will: not addressed Will discuss at future visit    Recommended Immunizations    Immunization History   Administered Date(s) Administered    COVID-19, MODERNA BLUE border, Primary or Immunocompromised, (age 12y+), IM, 100 mcg/0.5mL 01/12/2021, 02/09/2021, 12/30/2021    Influenza Vaccine, unspecified formulation 09/26/2016    Influenza Virus Vaccine 10/11/2012, 10/07/2013, 09/01/2014, 09/01/2015, 11/16/2017, 10/01/2018, 10/01/2020, 10/01/2021    Pneumococcal Polysaccharide ([id number]) 11/13/2009    Td, unspecified formulation 05/22/2018    Tdap (Boostrix, Adacel) 12/29/2008    Pt will resume vaccines when cleared to do so by [facility]         Other Recommendations   Follow up in this office every 6 months for re-evaluation of your chronic medical issues  See an eye specialist every 1-2 years  See a dentist every 6 months  Try to get at least 30 minutes of exercise 3-4 days per week  Always wear a seat belt when traveling in a car  Always wear a helmet when riding a bicycle or motorcycle  When exposed to the sun, use a sunscreen that protects against both UVA and UVB radiation with an SPF of 30 or greater- reapply every 2 to 3 hours or after sweating, drying off with a towel, or swimming  Have your blood pressure checked at least once every year             Assessment/Plan:  Kike Pat was seen today for annual exam.    Diagnoses and all orders for this visit:    Routine general medical examination at a health care facility  See preventative table above. Labs being done frequently by [facility] providers. Reviewed today. Recommended flu vaccine when cleared by [facility] to receive. Erectile dysfunction, unspecified erectile dysfunction type  -     sildenafil (VIAGRA) 100 MG tablet; Take 1 tablet by mouth daily as needed for Erectile Dysfunction  Trial of Viagra as above. Can take 0.5-1 pill qd prn. Multiple myeloma not having achieved remission ([facility] [address]  S/P BMT 8/30/22.    Continue treatment/ follow ups per [facility]. Hypertension, essential  BP at goal without current medication. Will monitor. Hyperlipidemia, mixed  Recent lipid panel showed elevated TC, TG, LDL. Discussed trying to restart low fat diet and regular exercise if weight loss has leveled out. Pt will discuss with Dr. Oliver De Paz. IFG (impaired fasting glucose)  Asymptomatic. Will recheck labs at next visit (has been getting frequent labs at [facility]). Discussed trying to restart lower sugar diet once weight loss is no longer a concern.       Return in about 6 months (around 4/4/2023) for Fasting recheck lipids, BP.

## 2022-10-04 NOTE — PATIENT INSTRUCTIONS
Try to switch back to a low fat, low cholesterol diet when able, to help lower your cholesterol numbers. Get flu shot when allowed to do so. Blood pressure looks good off medication. Continue to monitor.   Return in about 6 months (around 4/4/2023) for Fasting recheck lipids, BP.

## 2023-01-03 ENCOUNTER — TELEPHONE (OUTPATIENT)
Dept: INTERNAL MEDICINE CLINIC | Age: 48
End: 2023-01-03

## 2023-01-03 DIAGNOSIS — E78.2 HYPERLIPIDEMIA, MIXED: ICD-10-CM

## 2023-01-03 DIAGNOSIS — I10 HYPERTENSION, ESSENTIAL: ICD-10-CM

## 2023-01-03 RX ORDER — ATORVASTATIN CALCIUM 20 MG/1
20 TABLET, FILM COATED ORAL EVERY EVENING
Qty: 90 TABLET | Refills: 3 | Status: SHIPPED | OUTPATIENT
Start: 2023-01-03

## 2023-01-03 RX ORDER — LISINOPRIL 20 MG/1
20 TABLET ORAL DAILY
Qty: 90 TABLET | Refills: 3 | Status: SHIPPED | OUTPATIENT
Start: 2023-01-03

## 2023-01-03 NOTE — TELEPHONE ENCOUNTER
Patient called.   Needs refill on:      atorvastatin (LIPITOR) 20 MG tablet      lisinopril (PRINIVIL;ZESTRIL) 20 MG tablet        [address]

## 2023-01-03 NOTE — TELEPHONE ENCOUNTER
Refill request for atorvastatin / lipitor  medication.      Name of Pharmacy- [facility]       Last visit - 10-4-2022     Pending visit - 4-    Last refill -2-      Medication Contract signed -   Last Mack barragan-         Additional Comments

## 2023-03-22 ENCOUNTER — OFFICE VISIT (OUTPATIENT)
Dept: SURGERY | Age: 48
End: 2023-03-22

## 2023-03-22 VITALS
HEIGHT: 67 IN | BODY MASS INDEX: 23.39 KG/M2 | DIASTOLIC BLOOD PRESSURE: 85 MMHG | SYSTOLIC BLOOD PRESSURE: 136 MMHG | HEART RATE: 69 BPM | WEIGHT: 149 LBS

## 2023-03-22 DIAGNOSIS — Z87.19 S/P INGUINAL HERNIORRHAPHY: Primary | ICD-10-CM

## 2023-03-22 DIAGNOSIS — Z98.890 S/P INGUINAL HERNIORRHAPHY: Primary | ICD-10-CM

## 2023-03-22 PROCEDURE — 99024 POSTOP FOLLOW-UP VISIT: CPT | Performed by: SURGERY

## 2023-04-05 NOTE — PROGRESS NOTES
HPI: Nursing notes reviewed. Patient reports some pain at prior left inguinal hernia repair site after lifting. No bulge felt. Improved with some rest but not resolved. ROS:  10 point review of systems performed with pertinent positives in HPI    Phys:    Abd - soft, minimal tender left groin, no hernia felt, no hematoma       Assesment: 49 yo with left groin strain    Plan: 1. No hernia on exam   2. Continue to rest and apply ice and take ibuprofen   3.   If not improving or worsened despite the above could get CT

## 2023-04-18 ENCOUNTER — OFFICE VISIT (OUTPATIENT)
Dept: INTERNAL MEDICINE CLINIC | Age: 48
End: 2023-04-18
Payer: COMMERCIAL

## 2023-04-18 VITALS
DIASTOLIC BLOOD PRESSURE: 72 MMHG | SYSTOLIC BLOOD PRESSURE: 122 MMHG | BODY MASS INDEX: 23.01 KG/M2 | HEIGHT: 67 IN | TEMPERATURE: 98.1 F | HEART RATE: 70 BPM | OXYGEN SATURATION: 99 % | WEIGHT: 146.6 LBS

## 2023-04-18 DIAGNOSIS — C90.00 MULTIPLE MYELOMA NOT HAVING ACHIEVED REMISSION (HCC): ICD-10-CM

## 2023-04-18 DIAGNOSIS — K21.9 GASTROESOPHAGEAL REFLUX DISEASE, UNSPECIFIED WHETHER ESOPHAGITIS PRESENT: ICD-10-CM

## 2023-04-18 DIAGNOSIS — B07.9 VERRUCA VULGARIS: ICD-10-CM

## 2023-04-18 DIAGNOSIS — I10 HYPERTENSION, ESSENTIAL: ICD-10-CM

## 2023-04-18 DIAGNOSIS — Z00.00 WELL ADULT EXAM: Primary | ICD-10-CM

## 2023-04-18 DIAGNOSIS — E78.2 HYPERLIPIDEMIA, MIXED: ICD-10-CM

## 2023-04-18 DIAGNOSIS — J01.90 ACUTE NON-RECURRENT SINUSITIS, UNSPECIFIED LOCATION: ICD-10-CM

## 2023-04-18 PROBLEM — M25.512 PAIN OF LEFT SHOULDER REGION: Status: RESOLVED | Noted: 2017-12-15 | Resolved: 2023-04-18

## 2023-04-18 PROBLEM — S46.919A STRAIN OF ACROMIOCLAVICULAR JOINT: Status: RESOLVED | Noted: 2017-12-15 | Resolved: 2023-04-18

## 2023-04-18 PROCEDURE — 3074F SYST BP LT 130 MM HG: CPT | Performed by: NURSE PRACTITIONER

## 2023-04-18 PROCEDURE — 99396 PREV VISIT EST AGE 40-64: CPT | Performed by: NURSE PRACTITIONER

## 2023-04-18 PROCEDURE — 3078F DIAST BP <80 MM HG: CPT | Performed by: NURSE PRACTITIONER

## 2023-04-18 RX ORDER — AMOXICILLIN AND CLAVULANATE POTASSIUM 875; 125 MG/1; MG/1
1 TABLET, FILM COATED ORAL 2 TIMES DAILY
Qty: 14 TABLET | Refills: 0 | Status: SHIPPED | OUTPATIENT
Start: 2023-04-18 | End: 2023-04-25

## 2023-04-18 RX ORDER — LORATADINE 10 MG/1
10 TABLET ORAL DAILY
COMMUNITY

## 2023-04-18 RX ORDER — LENALIDOMIDE 10 MG/1
10 CAPSULE ORAL DAILY
COMMUNITY

## 2023-04-18 SDOH — ECONOMIC STABILITY: FOOD INSECURITY: WITHIN THE PAST 12 MONTHS, THE FOOD YOU BOUGHT JUST DIDN'T LAST AND YOU DIDN'T HAVE MONEY TO GET MORE.: NEVER TRUE

## 2023-04-18 SDOH — ECONOMIC STABILITY: INCOME INSECURITY: HOW HARD IS IT FOR YOU TO PAY FOR THE VERY BASICS LIKE FOOD, HOUSING, MEDICAL CARE, AND HEATING?: NOT HARD AT ALL

## 2023-04-18 SDOH — ECONOMIC STABILITY: HOUSING INSECURITY
IN THE LAST 12 MONTHS, WAS THERE A TIME WHEN YOU DID NOT HAVE A STEADY PLACE TO SLEEP OR SLEPT IN A SHELTER (INCLUDING NOW)?: NO

## 2023-04-18 SDOH — ECONOMIC STABILITY: FOOD INSECURITY: WITHIN THE PAST 12 MONTHS, YOU WORRIED THAT YOUR FOOD WOULD RUN OUT BEFORE YOU GOT MONEY TO BUY MORE.: NEVER TRUE

## 2023-04-18 ASSESSMENT — PATIENT HEALTH QUESTIONNAIRE - PHQ9
1. LITTLE INTEREST OR PLEASURE IN DOING THINGS: 0
2. FEELING DOWN, DEPRESSED OR HOPELESS: 0
SUM OF ALL RESPONSES TO PHQ QUESTIONS 1-9: 0
SUM OF ALL RESPONSES TO PHQ9 QUESTIONS 1 & 2: 0

## 2023-04-18 ASSESSMENT — ENCOUNTER SYMPTOMS
VOMITING: 0
NAUSEA: 0
SINUS PAIN: 0
SORE THROAT: 0
CHEST TIGHTNESS: 0
DIARRHEA: 0
COUGH: 0
CONSTIPATION: 0
SHORTNESS OF BREATH: 0

## 2023-04-18 NOTE — PROGRESS NOTES
3 hours after eating, avoid tight fitting clothing. Weight loss frequent helps heartburn/reflux especially with the presence of a hiatal hernia. Hyperlipidemia, mixed    Recheck lipids. Continue statin. Encouraged decreasing fatty, cholesterol rich foods in the diet (I.e. Red meats, butter, whole fat milk). Dietary choices like olive oil instead of butter, baked foods instead of fried can help to further prevent future elevations. Foods high in omega fatty acids can help to further decrease lipids additionally. Emphasis placed on incorporating fish, lean proteins (chicken, turkey, pork), fresh fruits and vegetables. Verruca vulgaris    Will attempt removal of the lesion with cryo in office. Explained risks, expected findings/skin changes as the lesion is destroyed. He is agreeable to proceed. Treated with 3 sessions of liquid nitrogen application until the lesion is completely blanched. Monitor for resolution and can attempt repeat treatment if needed. Return in about 6 months (around 10/18/2023). Patientshould call the office immediately with new or ongoing signs or symptoms or worsening, or proceed to the emergency room. If you are on medications which could impair your senses, you are at risk of weakness, falls,dizziness, or drowsiness. You should be careful during activities which could place you at risk of harm, such as climbing, using stairs, operating machinery, or driving vehicles. If you feel you cannot safely do theseactivities, you should request others to help you, or avoid the activities altogether. If you are drowsy for any other reason, you should use the same precautions as listed above. Call if pattern of symptoms change or persists for an extended time.       aMi Cr

## 2023-07-26 ENCOUNTER — HOSPITAL ENCOUNTER (OUTPATIENT)
Dept: VASCULAR LAB | Age: 48
Discharge: HOME | End: 2023-07-26
Payer: COMMERCIAL

## 2023-07-26 DIAGNOSIS — C90.00 MULTIPLE MYELOMA NOT HAVING ACHIEVED REMISSION (HCC): ICD-10-CM

## 2023-07-26 DIAGNOSIS — R60.0 LOCALIZED EDEMA: ICD-10-CM

## 2023-07-26 PROCEDURE — 93971 EXTREMITY STUDY: CPT

## 2023-10-18 ENCOUNTER — HOSPITAL ENCOUNTER (OUTPATIENT)
Age: 48
Discharge: HOME | End: 2023-10-18
Payer: COMMERCIAL

## 2023-10-18 DIAGNOSIS — Z00.00 WELL ADULT EXAM: ICD-10-CM

## 2023-10-18 LAB
CHOLEST SERPL-MCNC: 112 MG/DL (ref 0–199)
HDLC SERPL-MCNC: 38 MG/DL (ref 40–60)
LDLC SERPL CALC-MCNC: 51 MG/DL
TRIGL SERPL-MCNC: 117 MG/DL (ref 0–150)
VLDLC SERPL CALC-MCNC: 23 MG/DL

## 2023-10-18 PROCEDURE — 80061 LIPID PANEL: CPT

## 2023-10-18 PROCEDURE — 36415 COLL VENOUS BLD VENIPUNCTURE: CPT

## 2023-10-18 PROCEDURE — 83036 HEMOGLOBIN GLYCOSYLATED A1C: CPT

## 2023-10-19 ENCOUNTER — OFFICE VISIT (OUTPATIENT)
Dept: INTERNAL MEDICINE CLINIC | Age: 48
End: 2023-10-19
Payer: COMMERCIAL

## 2023-10-19 VITALS
SYSTOLIC BLOOD PRESSURE: 122 MMHG | TEMPERATURE: 97.4 F | BODY MASS INDEX: 24.17 KG/M2 | DIASTOLIC BLOOD PRESSURE: 80 MMHG | RESPIRATION RATE: 12 BRPM | WEIGHT: 152 LBS

## 2023-10-19 DIAGNOSIS — C90.00 MULTIPLE MYELOMA NOT HAVING ACHIEVED REMISSION (HCC): ICD-10-CM

## 2023-10-19 DIAGNOSIS — I10 HYPERTENSION, ESSENTIAL: Primary | ICD-10-CM

## 2023-10-19 DIAGNOSIS — Z52.011 AUTOLOGOUS DONOR OF STEM CELLS: ICD-10-CM

## 2023-10-19 DIAGNOSIS — E78.2 HYPERLIPIDEMIA, MIXED: ICD-10-CM

## 2023-10-19 DIAGNOSIS — K21.9 GASTROESOPHAGEAL REFLUX DISEASE, UNSPECIFIED WHETHER ESOPHAGITIS PRESENT: ICD-10-CM

## 2023-10-19 LAB
EST. AVERAGE GLUCOSE BLD GHB EST-MCNC: 93.9 MG/DL
HBA1C MFR BLD: 4.9 %

## 2023-10-19 PROCEDURE — 3074F SYST BP LT 130 MM HG: CPT | Performed by: NURSE PRACTITIONER

## 2023-10-19 PROCEDURE — 3079F DIAST BP 80-89 MM HG: CPT | Performed by: NURSE PRACTITIONER

## 2023-10-19 PROCEDURE — 99214 OFFICE O/P EST MOD 30 MIN: CPT | Performed by: NURSE PRACTITIONER

## 2023-10-19 ASSESSMENT — ENCOUNTER SYMPTOMS
VOMITING: 0
NAUSEA: 0
CHEST TIGHTNESS: 0
CONSTIPATION: 0
COUGH: 0
SINUS PAIN: 0
SHORTNESS OF BREATH: 0
DIARRHEA: 0
SORE THROAT: 0

## 2023-12-28 DIAGNOSIS — E78.2 HYPERLIPIDEMIA, MIXED: ICD-10-CM

## 2023-12-28 DIAGNOSIS — I10 HYPERTENSION, ESSENTIAL: ICD-10-CM

## 2023-12-28 RX ORDER — LISINOPRIL 20 MG/1
20 TABLET ORAL DAILY
Qty: 90 TABLET | Refills: 3 | OUTPATIENT
Start: 2023-12-28

## 2023-12-28 RX ORDER — ATORVASTATIN CALCIUM 20 MG/1
TABLET, FILM COATED ORAL
Qty: 90 TABLET | Refills: 3 | OUTPATIENT
Start: 2023-12-28

## 2024-03-27 ENCOUNTER — HOSPITAL ENCOUNTER (OUTPATIENT)
Dept: VASCULAR LAB | Age: 49
Discharge: HOME | End: 2024-03-27
Payer: COMMERCIAL

## 2024-03-27 DIAGNOSIS — R52 PAIN: ICD-10-CM

## 2024-03-27 PROCEDURE — 93971 EXTREMITY STUDY: CPT

## 2024-04-16 ASSESSMENT — PATIENT HEALTH QUESTIONNAIRE - PHQ9
SUM OF ALL RESPONSES TO PHQ QUESTIONS 1-9: 0
SUM OF ALL RESPONSES TO PHQ9 QUESTIONS 1 & 2: 0
SUM OF ALL RESPONSES TO PHQ QUESTIONS 1-9: 0
SUM OF ALL RESPONSES TO PHQ9 QUESTIONS 1 & 2: 0
2. FEELING DOWN, DEPRESSED OR HOPELESS: NOT AT ALL
SUM OF ALL RESPONSES TO PHQ QUESTIONS 1-9: 0
1. LITTLE INTEREST OR PLEASURE IN DOING THINGS: NOT AT ALL
SUM OF ALL RESPONSES TO PHQ QUESTIONS 1-9: 0
1. LITTLE INTEREST OR PLEASURE IN DOING THINGS: NOT AT ALL
2. FEELING DOWN, DEPRESSED OR HOPELESS: NOT AT ALL

## 2024-04-19 ENCOUNTER — OFFICE VISIT (OUTPATIENT)
Dept: INTERNAL MEDICINE CLINIC | Age: 49
End: 2024-04-19
Payer: COMMERCIAL

## 2024-04-19 VITALS
SYSTOLIC BLOOD PRESSURE: 110 MMHG | TEMPERATURE: 97.2 F | WEIGHT: 159.6 LBS | BODY MASS INDEX: 25.65 KG/M2 | HEIGHT: 66 IN | OXYGEN SATURATION: 98 % | HEART RATE: 60 BPM | DIASTOLIC BLOOD PRESSURE: 72 MMHG

## 2024-04-19 DIAGNOSIS — C90.00 MULTIPLE MYELOMA NOT HAVING ACHIEVED REMISSION (HCC): ICD-10-CM

## 2024-04-19 DIAGNOSIS — E78.2 HYPERLIPIDEMIA, MIXED: ICD-10-CM

## 2024-04-19 DIAGNOSIS — Z00.00 WELL ADULT EXAM: Primary | ICD-10-CM

## 2024-04-19 DIAGNOSIS — I10 HYPERTENSION, ESSENTIAL: ICD-10-CM

## 2024-04-19 DIAGNOSIS — D69.6 THROMBOCYTOPENIA, UNSPECIFIED (HCC): ICD-10-CM

## 2024-04-19 PROCEDURE — 3078F DIAST BP <80 MM HG: CPT | Performed by: NURSE PRACTITIONER

## 2024-04-19 PROCEDURE — 99214 OFFICE O/P EST MOD 30 MIN: CPT | Performed by: NURSE PRACTITIONER

## 2024-04-19 PROCEDURE — 3074F SYST BP LT 130 MM HG: CPT | Performed by: NURSE PRACTITIONER

## 2024-04-19 RX ORDER — OMEPRAZOLE 10 MG/1
10 CAPSULE, DELAYED RELEASE ORAL DAILY
COMMUNITY

## 2024-04-19 RX ORDER — AZELASTINE 1 MG/ML
1 SPRAY, METERED NASAL 2 TIMES DAILY
Qty: 30 ML | Refills: 0 | Status: SHIPPED | OUTPATIENT
Start: 2024-04-19

## 2024-04-19 SDOH — ECONOMIC STABILITY: FOOD INSECURITY: WITHIN THE PAST 12 MONTHS, THE FOOD YOU BOUGHT JUST DIDN'T LAST AND YOU DIDN'T HAVE MONEY TO GET MORE.: NEVER TRUE

## 2024-04-19 SDOH — ECONOMIC STABILITY: FOOD INSECURITY: WITHIN THE PAST 12 MONTHS, YOU WORRIED THAT YOUR FOOD WOULD RUN OUT BEFORE YOU GOT MONEY TO BUY MORE.: NEVER TRUE

## 2024-04-19 SDOH — ECONOMIC STABILITY: INCOME INSECURITY: HOW HARD IS IT FOR YOU TO PAY FOR THE VERY BASICS LIKE FOOD, HOUSING, MEDICAL CARE, AND HEATING?: NOT HARD AT ALL

## 2024-04-19 ASSESSMENT — ENCOUNTER SYMPTOMS
NAUSEA: 0
SHORTNESS OF BREATH: 0
VOMITING: 0
SORE THROAT: 0
DIARRHEA: 0
CHEST TIGHTNESS: 0
SINUS PAIN: 0
CONSTIPATION: 0
COUGH: 0

## 2024-04-19 NOTE — PROGRESS NOTES
[facility] PHYSIC[facility]ACT[facility]  [facility]  [address], [place] [address]  Tel:[phone]    Brody Ivory is a 48 y.o. male who presents today for his medical conditions/complaints as noted below.  Brody Ivory is c/o of Follow-up (6 month follow up, allergies , needs form completed)    Chief Complaint   Patient presents with    Follow-up     6 month follow up, allergies , needs form completed     HPI:     Mr. Ivory presents for his semi routine check up. He states he is overall well.      Recovering from MM scare in which was largely controlled through bone marrow transplant and chemo in 2022. Doing well overall. Concerns for coverage for his Revlimid. States he stays active and eats a healthy diet overall to maintain overall health.      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 Has a camping trip upcoming this summer. He participates in weight lifting to maintain weight as well.      His BP and cholesterol are now better controlled since he restarted his statin. This was stopped during his chemo treatment due to liver enzyme elevation.         Past Medical History:   Diagnosis Date    Allergic rhinitis     Erectile dysfunction 10/4/2022    GERD (gastroesophageal reflux disease)     Greater trochanteric bursitis of left hip 1/22/2016    HTN (hypertension) 9/26/2013    Hyperlipidemia     IFG (impaired fasting glucose) 5/22/2012    Left inguinal hernia     Lumbar spondylolysis     Multiple myeloma not having achieved remission (HCC) 4/12/2022    Pain of left shoulder region 12/15/2017    PONV (postoperative nausea and vomiting)     Rhinitis, likely nonallergic (IgE neg 11/09) but sx seasonal 5/22/2012    Strain of acromioclavicular joint 12/15/2017    Tight ring on finger     Vitamin D deficiency         Past Surgical History:   Procedure Laterality Date    BONE MARROW TRANSPLANT  08/30/2022    HERNIA REPAIR  as infant

## 2024-04-24 NOTE — ASSESSMENT & PLAN NOTE
Well-controlled, continue current medications and continue current treatment plan
Vaccine status unknown

## 2024-10-11 ENCOUNTER — HOSPITAL ENCOUNTER (OUTPATIENT)
Age: 49
Discharge: HOME | End: 2024-10-11
Payer: COMMERCIAL

## 2024-10-11 DIAGNOSIS — Z00.00 WELL ADULT EXAM: ICD-10-CM

## 2024-10-11 LAB
ALBUMIN SERPL-MCNC: 4.5 G/DL (ref 3.4–5)
ALBUMIN/GLOB SERPL: 2 {RATIO} (ref 1.1–2.2)
ALP SERPL-CCNC: 167 U/L (ref 40–129)
ALT SERPL-CCNC: 80 U/L (ref 10–40)
ANION GAP SERPL CALCULATED.3IONS-SCNC: 10 MMOL/L (ref 3–16)
AST SERPL-CCNC: 41 U/L (ref 15–37)
BILIRUB SERPL-MCNC: 0.7 MG/DL (ref 0–1)
BUN SERPL-MCNC: 20 MG/DL (ref 7–20)
CALCIUM SERPL-MCNC: 9.6 MG/DL (ref 8.3–10.6)
CHLORIDE SERPL-SCNC: 100 MMOL/L (ref 99–110)
CHOLEST SERPL-MCNC: 127 MG/DL (ref 0–199)
CO2 SERPL-SCNC: 27 MMOL/L (ref 21–32)
CREAT SERPL-MCNC: 1.1 MG/DL (ref 0.9–1.3)
EST. AVERAGE GLUCOSE BLD GHB EST-MCNC: 96.8 MG/DL
GFR SERPLBLD CREATININE-BSD FMLA CKD-EPI: 81 ML/MIN/{1.73_M2}
GLUCOSE SERPL-MCNC: 82 MG/DL (ref 70–99)
HBA1C MFR BLD: 5 %
HDLC SERPL-MCNC: 44 MG/DL (ref 40–60)
LDLC SERPL CALC-MCNC: 67 MG/DL
POTASSIUM SERPL-SCNC: 4.3 MMOL/L (ref 3.5–5.1)
PROT SERPL-MCNC: 6.8 G/DL (ref 6.4–8.2)
PSA SERPL DL<=0.01 NG/ML-MCNC: 0.98 NG/ML (ref 0–4)
SODIUM SERPL-SCNC: 137 MMOL/L (ref 136–145)
TRIGL SERPL-MCNC: 81 MG/DL (ref 0–150)
VLDLC SERPL CALC-MCNC: 16 MG/DL

## 2024-10-11 PROCEDURE — 83036 HEMOGLOBIN GLYCOSYLATED A1C: CPT

## 2024-10-11 PROCEDURE — 80061 LIPID PANEL: CPT

## 2024-10-11 PROCEDURE — 84153 ASSAY OF PSA TOTAL: CPT

## 2024-10-11 PROCEDURE — 36415 COLL VENOUS BLD VENIPUNCTURE: CPT

## 2024-10-11 PROCEDURE — 80053 COMPREHEN METABOLIC PANEL: CPT

## 2024-10-14 ENCOUNTER — TELEPHONE (OUTPATIENT)
Dept: INTERNAL MEDICINE CLINIC | Age: 49
End: 2024-10-14

## 2024-10-14 NOTE — TELEPHONE ENCOUNTER
Patient called to let Akash know that he is taking a creatine supplement.  Akash had asked if anything had changed to cause a change in his lab results.

## 2024-10-17 ENCOUNTER — OFFICE VISIT (OUTPATIENT)
Dept: INTERNAL MEDICINE CLINIC | Age: 49
End: 2024-10-17
Payer: COMMERCIAL

## 2024-10-17 VITALS
TEMPERATURE: 97.7 F | WEIGHT: 165.2 LBS | HEART RATE: 67 BPM | HEIGHT: 66 IN | BODY MASS INDEX: 26.55 KG/M2 | OXYGEN SATURATION: 98 % | SYSTOLIC BLOOD PRESSURE: 104 MMHG | DIASTOLIC BLOOD PRESSURE: 62 MMHG

## 2024-10-17 DIAGNOSIS — I10 HYPERTENSION, ESSENTIAL: ICD-10-CM

## 2024-10-17 DIAGNOSIS — R74.8 ELEVATED LIVER ENZYMES: Primary | ICD-10-CM

## 2024-10-17 DIAGNOSIS — N52.9 ERECTILE DYSFUNCTION, UNSPECIFIED ERECTILE DYSFUNCTION TYPE: ICD-10-CM

## 2024-10-17 DIAGNOSIS — D69.6 THROMBOCYTOPENIA, UNSPECIFIED (HCC): ICD-10-CM

## 2024-10-17 DIAGNOSIS — E78.2 HYPERLIPIDEMIA, MIXED: ICD-10-CM

## 2024-10-17 PROBLEM — Z52.011 AUTOLOGOUS DONOR OF STEM CELLS: Status: RESOLVED | Noted: 2022-07-21 | Resolved: 2024-10-17

## 2024-10-17 PROCEDURE — 3078F DIAST BP <80 MM HG: CPT | Performed by: NURSE PRACTITIONER

## 2024-10-17 PROCEDURE — 99214 OFFICE O/P EST MOD 30 MIN: CPT | Performed by: NURSE PRACTITIONER

## 2024-10-17 PROCEDURE — 3074F SYST BP LT 130 MM HG: CPT | Performed by: NURSE PRACTITIONER

## 2024-10-17 RX ORDER — CETIRIZINE HYDROCHLORIDE 10 MG/1
10 TABLET ORAL DAILY
COMMUNITY

## 2024-10-17 RX ORDER — MUPIROCIN 20 MG/G
OINTMENT TOPICAL
Qty: 15 G | Refills: 0 | Status: SHIPPED | OUTPATIENT
Start: 2024-10-17 | End: 2024-10-24

## 2024-10-17 ASSESSMENT — ENCOUNTER SYMPTOMS
DIARRHEA: 0
CONSTIPATION: 0
SORE THROAT: 0
SINUS PAIN: 0
CHEST TIGHTNESS: 0
NAUSEA: 0
VOMITING: 0
COUGH: 0
SHORTNESS OF BREATH: 0

## 2024-10-17 NOTE — PROGRESS NOTES
[facility] PHYSICIAN [facility]ACT[facility]  [facility]  [address] , [place] [address]  Tel:[phone]    Brody Ivory is a 49 y.o. male who presents today for his medical conditions/complaints as noted below.  Brody Ivory is c/o of Follow-up (6 month follow up )      Chief Complaint   Patient presents with    Follow-up     6 month follow up        HPI:     Mr. Ivory presents for his semi routine check up. He states he is overall well.      Managing well with MM scare in which was largely controlled through bone marrow transplant and chemo in 2022. States he stays active and eats a healthy diet overall to maintain overall health. Continues Revlimid per Dr. Posada with recurrent monitoring.     Recent liver enzyme elevation with suspicion that he stopped/restarted the Revlimid while taking an antibiotic for cellulitis which happened to be only a few days before his sample. Denies abdominal pain, nausea/vomiting, bowel changes.      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 Recently went on a backpacking trip with his son in [place] and enjoyed his time.      His BP and cholesterol are now better controlled since he restarted his statin. This was stopped during his chemo treatment due to liver enzyme elevation.      Describes right nare pain which he has experienced previously. Previously inflamed hair follicle. Treated with topical antibiotic which helped.      Entire medical history, surgical history, family history, allergies, social history, and health maintenance items reviewed and updated as captured in the relevant section of patient's chart.        Past Medical History:   Diagnosis Date    Allergic rhinitis     Autologous donor of stem cells 07/21/2022    Erectile dysfunction 10/04/2022    GERD (gastroesophageal reflux disease)     Greater trochanteric bursitis of left hip 01/22/2016    HTN (hypertension) 09/26/2013    Hyperlipidemia

## 2024-10-23 RX ORDER — MUPIROCIN 20 MG/G
OINTMENT TOPICAL
Refills: 0 | OUTPATIENT
Start: 2024-10-23

## 2024-11-20 LAB
ALBUMIN: 3.7 G/DL
ALP BLD-CCNC: 168 U/L
ALT SERPL-CCNC: 207 U/L
ANION GAP SERPL CALCULATED.3IONS-SCNC: NORMAL MMOL/L
AST SERPL-CCNC: 99 U/L
BASOPHILS ABSOLUTE: 0.8 /ΜL
BASOPHILS RELATIVE PERCENT: 1.19 %
BILIRUB SERPL-MCNC: 1 MG/DL (ref 0.1–1.4)
BUN BLDV-MCNC: 14 MG/DL
CALCIUM SERPL-MCNC: 10 MG/DL
CHLORIDE BLD-SCNC: 100 MMOL/L
CO2: 34 MMOL/L
CREAT SERPL-MCNC: 1.1 MG/DL
EOSINOPHILS ABSOLUTE: 0.15 /ΜL
EOSINOPHILS RELATIVE PERCENT: 6.4 %
GFR, ESTIMATED: >60
GLUCOSE BLD-MCNC: 95 MG/DL
HCT VFR BLD CALC: 40.2 % (ref 41–53)
HEMOGLOBIN: 14 G/DL (ref 13.5–17.5)
LYMPHOCYTES ABSOLUTE: 0.59 /ΜL
LYMPHOCYTES RELATIVE PERCENT: 25 %
MCH RBC QN AUTO: 33.6 PG
MCHC RBC AUTO-ENTMCNC: 34.8 G/DL
MCV RBC AUTO: 96.4 FL
MONOCYTES ABSOLUTE: 0.41 /ΜL
MONOCYTES RELATIVE PERCENT: 17.4 %
NEUTROPHILS ABSOLUTE: 50 /ΜL
NEUTROPHILS RELATIVE PERCENT: 50.4 %
PLATELET # BLD: 101 K/ΜL
PMV BLD AUTO: ABNORMAL FL
POTASSIUM SERPL-SCNC: 4.3 MMOL/L
RBC # BLD: 4.17 10^6/ΜL
SODIUM BLD-SCNC: 141 MMOL/L
TOTAL PROTEIN: 7.4 G/DL (ref 6.4–8.2)
WBC # BLD: 2.4 10^3/ML

## 2024-12-05 DIAGNOSIS — E78.2 HYPERLIPIDEMIA, MIXED: ICD-10-CM

## 2024-12-05 DIAGNOSIS — I10 HYPERTENSION, ESSENTIAL: ICD-10-CM

## 2024-12-05 RX ORDER — ATORVASTATIN CALCIUM 20 MG/1
TABLET, FILM COATED ORAL
Qty: 90 TABLET | Refills: 3 | Status: SHIPPED | OUTPATIENT
Start: 2024-12-05

## 2024-12-05 RX ORDER — LISINOPRIL 20 MG/1
20 TABLET ORAL DAILY
Qty: 90 TABLET | Refills: 3 | Status: SHIPPED | OUTPATIENT
Start: 2024-12-05

## 2024-12-05 NOTE — TELEPHONE ENCOUNTER
Refill request for   Atorvastatin ( Lipitor) 20 mg  isinopril (PRINIVIL;ZESTRIL) 20 MG tablet     medication.     Name of Pharmacy- [facility]      Last visit - 10/17/24     Pending visit - 4/22/25    Last refill -12/22/23      Medication Contract signed -   Last Oarrs ran-         Additional Comments

## 2024-12-18 LAB
ALT SERPL-CCNC: 40 U/L
AST SERPL-CCNC: 37 U/L
BASOPHILS ABSOLUTE: 0.01 /ΜL
BASOPHILS RELATIVE PERCENT: 0.4 %
BUN BLDV-MCNC: 17 MG/DL
CALCIUM SERPL-MCNC: 9.7 MG/DL
CHLORIDE BLD-SCNC: 99 MMOL/L
CO2: 31 MMOL/L
CREAT SERPL-MCNC: 1.1 MG/DL
EGFR: >60
EOSINOPHILS ABSOLUTE: 0.15 /ΜL
EOSINOPHILS RELATIVE PERCENT: 6.2 %
GLUCOSE BLD-MCNC: 87 MG/DL
HCT VFR BLD CALC: 42.5 % (ref 41–53)
HEMOGLOBIN: 15 G/DL (ref 13.5–17.5)
LYMPHOCYTES ABSOLUTE: 0.76 /ΜL
LYMPHOCYTES RELATIVE PERCENT: 31.3 %
MCH RBC QN AUTO: 33.3 PG
MCHC RBC AUTO-ENTMCNC: 35.5 G/DL
MCV RBC AUTO: 94.4 FL
MONOCYTES ABSOLUTE: 0.35 /ΜL
MONOCYTES RELATIVE PERCENT: 14.4 %
NEUTROPHILS ABSOLUTE: 1.16 /ΜL
NEUTROPHILS RELATIVE PERCENT: 47.7 %
PLATELET # BLD: 107 K/ΜL
PMV BLD AUTO: NORMAL FL
POTASSIUM SERPL-SCNC: 4.5 MMOL/L
RBC # BLD: 4.5 10^6/ΜL
SODIUM BLD-SCNC: 142 MMOL/L
WBC # BLD: 2.4 10^3/ML

## 2025-01-30 RX ORDER — TADALAFIL 5 MG/1
TABLET ORAL
Qty: 30 TABLET | Refills: 0 | Status: SHIPPED | OUTPATIENT
Start: 2025-01-30

## 2025-02-24 RX ORDER — TADALAFIL 5 MG/1
TABLET ORAL
Qty: 30 TABLET | Refills: 0 | Status: SHIPPED | OUTPATIENT
Start: 2025-02-24

## 2025-02-24 RX ORDER — TADALAFIL 5 MG/1
TABLET ORAL
Qty: 30 TABLET | Refills: 0 | OUTPATIENT
Start: 2025-02-24

## 2025-02-24 NOTE — TELEPHONE ENCOUNTER
Refill request for  medication.       TADALAFIL 5 MG       Name of Pharmacy- [facility]       Last visit - 10/17/2024     Pending visit - 04/22/2025    Last refill -01/30/2025

## 2025-03-21 NOTE — TELEPHONE ENCOUNTER
Refill request for Bempedoic Acid-Ezetimibe 180-10 MG TABS medication.     Name of Pharmacy- [facility]      Last visit - 10-     Pending visit - 4-    Last refill - 12-      Medication Contract signed -   Last Oarrs ran-         Additional Comments

## 2025-04-22 ENCOUNTER — OFFICE VISIT (OUTPATIENT)
Dept: INTERNAL MEDICINE CLINIC | Age: 50
End: 2025-04-22

## 2025-04-22 VITALS
SYSTOLIC BLOOD PRESSURE: 110 MMHG | WEIGHT: 166.8 LBS | HEIGHT: 66 IN | DIASTOLIC BLOOD PRESSURE: 72 MMHG | OXYGEN SATURATION: 98 % | HEART RATE: 60 BPM | BODY MASS INDEX: 26.81 KG/M2 | TEMPERATURE: 98.2 F

## 2025-04-22 DIAGNOSIS — N52.9 ERECTILE DYSFUNCTION, UNSPECIFIED ERECTILE DYSFUNCTION TYPE: ICD-10-CM

## 2025-04-22 DIAGNOSIS — C90.00 MULTIPLE MYELOMA NOT HAVING ACHIEVED REMISSION (HCC): ICD-10-CM

## 2025-04-22 DIAGNOSIS — E78.41 ELEVATED LIPOPROTEIN(A): ICD-10-CM

## 2025-04-22 DIAGNOSIS — R74.8 ELEVATED LIVER ENZYMES: ICD-10-CM

## 2025-04-22 DIAGNOSIS — I10 HYPERTENSION, ESSENTIAL: Primary | ICD-10-CM

## 2025-04-22 DIAGNOSIS — I10 HYPERTENSION, ESSENTIAL: ICD-10-CM

## 2025-04-22 DIAGNOSIS — E78.2 HYPERLIPIDEMIA, MIXED: ICD-10-CM

## 2025-04-22 LAB
ALBUMIN SERPL-MCNC: 4.5 G/DL (ref 3.4–5)
ALBUMIN/GLOB SERPL: 2 {RATIO} (ref 1.1–2.2)
ALP SERPL-CCNC: 131 U/L (ref 40–129)
ALT SERPL-CCNC: 44 U/L (ref 10–40)
ANION GAP SERPL CALCULATED.3IONS-SCNC: 8 MMOL/L (ref 3–16)
AST SERPL-CCNC: 35 U/L (ref 15–37)
BILIRUB DIRECT SERPL-MCNC: 0.3 MG/DL (ref 0–0.3)
BILIRUB INDIRECT SERPL-MCNC: 0.4 MG/DL (ref 0–1)
BILIRUB SERPL-MCNC: 0.7 MG/DL (ref 0–1)
BUN SERPL-MCNC: 17 MG/DL (ref 7–20)
CALCIUM SERPL-MCNC: 9.4 MG/DL (ref 8.3–10.6)
CHLORIDE SERPL-SCNC: 100 MMOL/L (ref 99–110)
CHOLEST SERPL-MCNC: 143 MG/DL (ref 0–199)
CO2 SERPL-SCNC: 28 MMOL/L (ref 21–32)
CREAT SERPL-MCNC: 1.1 MG/DL (ref 0.9–1.3)
EST. AVERAGE GLUCOSE BLD GHB EST-MCNC: 93.9 MG/DL
GFR SERPLBLD CREATININE-BSD FMLA CKD-EPI: 81 ML/MIN/{1.73_M2}
GLUCOSE SERPL-MCNC: 102 MG/DL (ref 70–99)
HBA1C MFR BLD: 4.9 %
HDLC SERPL-MCNC: 38 MG/DL (ref 40–60)
LDLC SERPL CALC-MCNC: 81 MG/DL
POTASSIUM SERPL-SCNC: 4.5 MMOL/L (ref 3.5–5.1)
PROT SERPL-MCNC: 6.7 G/DL (ref 6.4–8.2)
SODIUM SERPL-SCNC: 136 MMOL/L (ref 136–145)
TRIGL SERPL-MCNC: 121 MG/DL (ref 0–150)
VLDLC SERPL CALC-MCNC: 24 MG/DL

## 2025-04-22 RX ORDER — TADALAFIL 5 MG/1
TABLET ORAL
Qty: 30 TABLET | Refills: 5 | Status: SHIPPED | OUTPATIENT
Start: 2025-04-22

## 2025-04-22 SDOH — ECONOMIC STABILITY: FOOD INSECURITY: WITHIN THE PAST 12 MONTHS, YOU WORRIED THAT YOUR FOOD WOULD RUN OUT BEFORE YOU GOT MONEY TO BUY MORE.: NEVER TRUE

## 2025-04-22 SDOH — ECONOMIC STABILITY: FOOD INSECURITY: WITHIN THE PAST 12 MONTHS, THE FOOD YOU BOUGHT JUST DIDN'T LAST AND YOU DIDN'T HAVE MONEY TO GET MORE.: NEVER TRUE

## 2025-04-22 ASSESSMENT — PATIENT HEALTH QUESTIONNAIRE - PHQ9
2. FEELING DOWN, DEPRESSED OR HOPELESS: NOT AT ALL
1. LITTLE INTEREST OR PLEASURE IN DOING THINGS: NOT AT ALL
SUM OF ALL RESPONSES TO PHQ QUESTIONS 1-9: 0
SUM OF ALL RESPONSES TO PHQ QUESTIONS 1-9: 0
SUM OF ALL RESPONSES TO PHQ9 QUESTIONS 1 & 2: 0
1. LITTLE INTEREST OR PLEASURE IN DOING THINGS: NOT AT ALL
SUM OF ALL RESPONSES TO PHQ QUESTIONS 1-9: 0
2. FEELING DOWN, DEPRESSED OR HOPELESS: NOT AT ALL
SUM OF ALL RESPONSES TO PHQ QUESTIONS 1-9: 0

## 2025-04-22 ASSESSMENT — ENCOUNTER SYMPTOMS
SINUS PAIN: 0
COUGH: 0
VOMITING: 0
CHEST TIGHTNESS: 0
NAUSEA: 0
DIARRHEA: 0
SHORTNESS OF BREATH: 0
CONSTIPATION: 0
SORE THROAT: 0

## 2025-04-22 NOTE — PROGRESS NOTES
Brody Ivory (:  1975) is a 49 y.o. male, here for evaluation of the following chief complaint(s):  Follow-up (6 month follow up )       Assessment & Plan  1. Erectile dysfunction.  - Reports that Cialis is effective for his condition.  - Suspects that oral chemotherapy may be contributing to erectile dysfunction, though no published evidence supports this.  - Adequate response to Cialis noted.  - Prescription for Cialis with refills will be provided.    2. Hyperlipidemia.  - Experienced initial joint pain with Nexletol but no issues since restarting it.  - Advised to continue strength training and exercising to maintain muscle mass and prevent tendon rupture.  - Advised to monitor for persistent joint pain and consider discontinuing medication if it recurs.  - No current issues reported with Nexletol.    3. Hypertension.  - Blood pressure readings are within the normal range today.  - Currently taking lisinopril and has started a new 90-day supply.  - No changes in medication required at this time.  - Continues to tolerate lisinopril well.    4. Health maintenance.  - Blood counts done in 2024; hemoglobin was 15.0.  - PSA checked in 10/2024.  - Laboratory tests will be conducted today to assess cholesterol levels and liver function.  - Patient advised to follow up in 6 months for routine evaluation.    Results  Labs   - Hemoglobin: 2024, 15.0   - Hemoglobin: 2024, 14.0   - Platelets: Approaching normal  1. Hypertension, essential  -     Comprehensive Metabolic Panel; Future  2. Multiple myeloma not having achieved remission (HCC)  3. Elevated lipoprotein(a)  -     Lipid Panel; Future  -     Hemoglobin A1C; Future  4. Hyperlipidemia, mixed  5. Erectile dysfunction, unspecified erectile dysfunction type    Return in about 6 months (around 10/22/2025) for Multiple Chronic Condition/half-way follow up (30 min).  Subjective   History of Present Illness  The patient presents for erectile

## 2025-04-23 ENCOUNTER — TELEPHONE (OUTPATIENT)
Dept: ADMINISTRATIVE | Age: 50
End: 2025-04-23

## 2025-04-23 ENCOUNTER — RESULTS FOLLOW-UP (OUTPATIENT)
Dept: INTERNAL MEDICINE CLINIC | Age: 50
End: 2025-04-23

## 2025-04-23 NOTE — TELEPHONE ENCOUNTER
Submitted PA for Tadalafil 5MG tablets   Via CMM Key: [id number] STATUS: PENDING.    Follow up done daily; if no decision with in three days we will refax.  If another three days goes by with no decision will call the insurance for status.

## 2025-04-24 LAB
SHBG SERPL-SCNC: 43 NMOL/L (ref 19–76)
TESTOST FREE SERPL-MCNC: 108.7 PG/ML (ref 47–244)
TESTOST SERPL-MCNC: 594 NG/DL (ref 193–740)

## 2025-04-24 NOTE — TELEPHONE ENCOUNTER
Your PA has been resolved, no additional PA is required. For further inquiries please contact the number on the back of the member prescription card.     Please notify patient. Thank you.

## 2025-05-13 ENCOUNTER — OFFICE VISIT (OUTPATIENT)
Dept: INTERNAL MEDICINE CLINIC | Age: 50
End: 2025-05-13

## 2025-05-13 VITALS
HEART RATE: 72 BPM | WEIGHT: 169.4 LBS | OXYGEN SATURATION: 97 % | SYSTOLIC BLOOD PRESSURE: 108 MMHG | DIASTOLIC BLOOD PRESSURE: 69 MMHG | BODY MASS INDEX: 27.23 KG/M2 | TEMPERATURE: 97.3 F | HEIGHT: 66 IN

## 2025-05-13 DIAGNOSIS — L02.91 ABSCESS: Primary | ICD-10-CM

## 2025-05-13 RX ORDER — SULFAMETHOXAZOLE AND TRIMETHOPRIM 800; 160 MG/1; MG/1
1 TABLET ORAL 2 TIMES DAILY
Qty: 20 TABLET | Refills: 0 | Status: SHIPPED | OUTPATIENT
Start: 2025-05-13 | End: 2025-05-23

## 2025-05-13 ASSESSMENT — ENCOUNTER SYMPTOMS
SORE THROAT: 0
DIARRHEA: 0
CONSTIPATION: 0
SINUS PAIN: 0
COUGH: 0
COLOR CHANGE: 1
VOMITING: 0
NAUSEA: 0
SHORTNESS OF BREATH: 0
CHEST TIGHTNESS: 0

## 2025-05-13 NOTE — PROGRESS NOTES
Brody Ivory (:  1975) is a 49 y.o. male, here for evaluation of the following chief complaint(s):  Other (Possible staph infection on R arm pit )       Assessment & Plan  1. Staphylococcal infection.  - The infection is likely due to staphylococcus, but it does not exhibit aggressive characteristics typical of MRSA.  - The patient's recent use of oral chemotherapy may have contributed to the development of the infection.  - A prescription for Bactrim, to be taken twice daily for 10 days, will be sent to pharmacy. He is advised to discontinue Keflex.  - The use of Hibiclens and warm compresses is recommended to aid in the expression of the abscess content. He is cautioned against attempting to drain the abscess himself. A probiotic regimen is suggested following the completion of the antibiotic course to mitigate potential gastrointestinal side effects. He is encouraged to maintain communication regarding the progress of the abscess and to report any changes or increased pain.    Results  Labs   - ALT: 44   - AST: 35   - Alkaline Phosphatase: 131   - A1c: 4.9   - LDL Cholesterol: 81   - Creatinine: 1.1   - GFR: 81   - Testosterone: Normal  1. Abscess  -     sulfamethoxazole-trimethoprim (BACTRIM DS;SEPTRA DS) 800-160 MG per tablet; Take 1 tablet by mouth 2 times daily for 10 days, Disp-20 tablet, R-0Normal    No follow-ups on file.  Subjective   History of Present Illness  The patient presents for evaluation of a staph infection.    He has been experiencing recurrent staph infections, with 6 to 7 episodes reported since 2024. The current episode is characterized by oozing and pain localized to the top of the lesion, without peripheral discomfort. He reports that the infection is not worsening, but it is abscessing. He does not believe the infection is MRSA as it has been present for a week, whereas his previous MRSA infections have typically progressed more rapidly. He has been proactive in 
Yes

## 2025-07-07 ENCOUNTER — OFFICE VISIT (OUTPATIENT)
Dept: INTERNAL MEDICINE CLINIC | Age: 50
End: 2025-07-07

## 2025-07-07 VITALS
BODY MASS INDEX: 26.71 KG/M2 | HEART RATE: 75 BPM | WEIGHT: 168 LBS | TEMPERATURE: 98.4 F | SYSTOLIC BLOOD PRESSURE: 129 MMHG | DIASTOLIC BLOOD PRESSURE: 78 MMHG | OXYGEN SATURATION: 98 %

## 2025-07-07 DIAGNOSIS — L03.90 CELLULITIS, UNSPECIFIED CELLULITIS SITE: Primary | ICD-10-CM

## 2025-07-07 RX ORDER — CEPHALEXIN 500 MG/1
500 CAPSULE ORAL 3 TIMES DAILY
Qty: 21 CAPSULE | Refills: 0 | Status: SHIPPED | OUTPATIENT
Start: 2025-07-07 | End: 2025-07-14

## 2025-07-07 ASSESSMENT — ENCOUNTER SYMPTOMS
SORE THROAT: 0
SHORTNESS OF BREATH: 0
COUGH: 0
CHEST TIGHTNESS: 0
VOMITING: 0
DIARRHEA: 0
CONSTIPATION: 0
NAUSEA: 0
SINUS PAIN: 0
COLOR CHANGE: 1
